# Patient Record
Sex: FEMALE | Race: BLACK OR AFRICAN AMERICAN | NOT HISPANIC OR LATINO | Employment: FULL TIME | URBAN - METROPOLITAN AREA
[De-identification: names, ages, dates, MRNs, and addresses within clinical notes are randomized per-mention and may not be internally consistent; named-entity substitution may affect disease eponyms.]

---

## 2017-01-25 ENCOUNTER — TELEPHONE (OUTPATIENT)
Dept: OBSTETRICS AND GYNECOLOGY | Facility: CLINIC | Age: 37
End: 2017-01-25

## 2017-01-25 DIAGNOSIS — Z36.89 ENCOUNTER FOR ROUTINE FETAL ULTRASOUND: Primary | ICD-10-CM

## 2017-01-25 NOTE — TELEPHONE ENCOUNTER
----- Message from Lianne Aceves sent at 1/24/2017 11:59 AM CST -----  Contact: pt   _X  1st Request  _  2nd Request  _  3rd Request        Who: SANTA PITTS [[id number]]    Why: pt is needing to schedule initial OB appt pt LMP 12/3/16 pt would like to be seen ay [facility]     What Number to Call Back: [phone].    When to Expect a call back: (Before the end of the day)   -- if call after 3:00 call back will be tomorrow.

## 2017-02-10 ENCOUNTER — HOSPITAL ENCOUNTER (OUTPATIENT)
Facility: OTHER | Age: 37
LOS: 1 days | Discharge: HOME | End: 2017-02-13
Attending: EMERGENCY MEDICINE | Admitting: OBSTETRICS & GYNECOLOGY
Payer: COMMERCIAL

## 2017-02-10 DIAGNOSIS — Z34.90 EARLY STAGE OF PREGNANCY: ICD-10-CM

## 2017-02-10 DIAGNOSIS — N39.0 URINARY TRACT INFECTION, SITE NOT SPECIFIED: ICD-10-CM

## 2017-02-10 DIAGNOSIS — Z3A.10 10 WEEKS GESTATION OF PREGNANCY: ICD-10-CM

## 2017-02-10 DIAGNOSIS — R11.14 BILIOUS VOMITING WITH NAUSEA: ICD-10-CM

## 2017-02-10 DIAGNOSIS — R11.2 INTRACTABLE VOMITING WITH NAUSEA, UNSPECIFIED VOMITING TYPE: Primary | ICD-10-CM

## 2017-02-10 PROCEDURE — 96366 THER/PROPH/DIAG IV INF ADDON: CPT

## 2017-02-10 PROCEDURE — G0378 HOSPITAL OBSERVATION PER HR: HCPCS

## 2017-02-10 PROCEDURE — 96365 THER/PROPH/DIAG IV INF INIT: CPT

## 2017-02-10 PROCEDURE — 96375 TX/PRO/DX INJ NEW DRUG ADDON: CPT

## 2017-02-10 PROCEDURE — 99284 EMERGENCY DEPT VISIT MOD MDM: CPT | Mod: 24,,, | Performed by: EMERGENCY MEDICINE

## 2017-02-10 PROCEDURE — 99283 EMERGENCY DEPT VISIT LOW MDM: CPT | Mod: ,,, | Performed by: EMERGENCY MEDICINE

## 2017-02-10 PROCEDURE — 96361 HYDRATE IV INFUSION ADD-ON: CPT

## 2017-02-10 PROCEDURE — 25000003 PHARM REV CODE 250: Performed by: EMERGENCY MEDICINE

## 2017-02-10 PROCEDURE — 99284 EMERGENCY DEPT VISIT MOD MDM: CPT | Mod: 25

## 2017-02-10 PROCEDURE — 63600175 PHARM REV CODE 636 W HCPCS: Performed by: EMERGENCY MEDICINE

## 2017-02-10 PROCEDURE — 11000001 HC ACUTE MED/SURG PRIVATE ROOM

## 2017-02-10 RX ORDER — METOCLOPRAMIDE HYDROCHLORIDE 5 MG/ML
INJECTION INTRAMUSCULAR; INTRAVENOUS
Status: DISPENSED
Start: 2017-02-10 | End: 2017-02-11

## 2017-02-10 RX ORDER — ONDANSETRON 2 MG/ML
8 INJECTION INTRAMUSCULAR; INTRAVENOUS
Status: COMPLETED | OUTPATIENT
Start: 2017-02-10 | End: 2017-02-10

## 2017-02-10 RX ORDER — METOCLOPRAMIDE HYDROCHLORIDE 5 MG/ML
10 INJECTION INTRAMUSCULAR; INTRAVENOUS
Status: COMPLETED | OUTPATIENT
Start: 2017-02-10 | End: 2017-02-10

## 2017-02-10 RX ORDER — SODIUM CHLORIDE 9 MG/ML
1000 INJECTION, SOLUTION INTRAVENOUS
Status: COMPLETED | OUTPATIENT
Start: 2017-02-10 | End: 2017-02-10

## 2017-02-10 RX ADMIN — SODIUM CHLORIDE 1000 ML: 0.9 INJECTION, SOLUTION INTRAVENOUS at 09:02

## 2017-02-10 RX ADMIN — SODIUM CHLORIDE 1000 ML: 0.9 INJECTION, SOLUTION INTRAVENOUS at 07:02

## 2017-02-10 RX ADMIN — SODIUM CHLORIDE 1000 ML: 0.9 INJECTION, SOLUTION INTRAVENOUS at 05:02

## 2017-02-10 RX ADMIN — PYRIDOXINE HYDROCHLORIDE 50 MG: 100 INJECTION, SOLUTION INTRAMUSCULAR; INTRAVENOUS at 09:02

## 2017-02-10 RX ADMIN — METOCLOPRAMIDE 10 MG: 5 INJECTION, SOLUTION INTRAMUSCULAR; INTRAVENOUS at 07:02

## 2017-02-10 RX ADMIN — ONDANSETRON 8 MG: 2 INJECTION INTRAMUSCULAR; INTRAVENOUS at 05:02

## 2017-02-10 NOTE — ED TRIAGE NOTES
C/o feeling dehydrated . States she threw up and felt dizzy after being dc'd from ER earlier today. Pregnant  - last period was 12/3/2016

## 2017-02-10 NOTE — ED PROVIDER NOTES
Encounter Date: 2/10/2017    SCRIBE #1 NOTE: I, Andradeus Moss, am scribing for, and in the presence of, Jarod Ca MD.       History     Chief Complaint   Patient presents with    Dizziness     Just discharged from ED for vomiting and dizziness. Reports still not feeling well. Dr. Ca made aware of patient checkin back in.      Review of patient's allergies indicates:  No Known Allergies  HPI Comments: Time seen by provider: 6:00 PM    This is a 36 y.o. female who was just evaluated in the ER. She was feeling better and discharged, but threw up in the parking lot prompting return. She states when she got to the parking lot, she felt a little light headed and vomited subsequently returning here. No further complaints or concerns at this time.     Pt initially presented with complaint of vomiting during 10th week gestation with onset 1 week ago. Reports A1. Associated sx include inability to tolerate PO x2 days, mild abdominal discomfort described as a soreness and dehydration. Pt does endorse vomiting with previous pregnancies for which she had to receive fluids but was never admitted to the hospital. Has not yet seen OB doctor for current pregnancy. Denies any vaginal bleeding or discharge. LNMP was December 3, 2016.        The history is provided by the patient and medical records.     Past Medical History   Diagnosis Date    Pregnancy      No past medical history pertinent negatives.  History reviewed. No pertinent past surgical history.  History reviewed. No pertinent family history.  Social History   Substance Use Topics    Smoking status: Never Smoker    Smokeless tobacco: None    Alcohol use No      Comment: occasional     Review of Systems   Constitutional: Negative for fever.   HENT: Negative for sore throat.    Respiratory: Negative for shortness of breath.    Cardiovascular: Negative for chest pain.   Gastrointestinal: Positive for nausea and vomiting.   Genitourinary: Negative for dysuria,  vaginal bleeding and vaginal discharge.   Musculoskeletal: Negative for back pain.   Skin: Negative for rash.   Neurological: Positive for light-headedness. Negative for weakness.   Hematological: Does not bruise/bleed easily.   All other systems reviewed and are negative.      Physical Exam   Initial Vitals   BP Pulse Resp Temp SpO2   02/10/17 1712 02/10/17 1712 02/10/17 1712 02/10/17 1712 02/10/17 1712   110/76 82 18 98.8 °F (37.1 °C) 97 %     Physical Exam    Vitals reviewed.  Constitutional: Vital signs are normal. She appears well-developed and well-nourished.   HENT:   Head: Normocephalic and atraumatic.   Mouth/Throat: Oropharynx is clear and moist.   Eyes: Conjunctivae and EOM are normal. Pupils are equal, round, and reactive to light.   Neck: Trachea normal and normal range of motion. Neck supple.   Cardiovascular: Normal rate, regular rhythm, normal heart sounds and normal pulses.   Pulmonary/Chest: Breath sounds normal. No respiratory distress.   Abdominal: Soft. Normal appearance and bowel sounds are normal. There is no tenderness.   Musculoskeletal: Normal range of motion.   Neurological: She is alert and oriented to person, place, and time.   Skin: Skin is warm and dry.         ED Course   Procedures  Labs Reviewed - No data to display          Medical Decision Making:   History:   Old Medical Records: I decided to obtain old medical records.  Other:   I have discussed this case with another health care provider.            Scribe Attestation:   Scribe #1: I performed the above scribed service and the documentation accurately describes the services I performed. I attest to the accuracy of the note.    Attending Attestation:           Physician Attestation for Scribe:  Physician Attestation Statement for Scribe #1: I, Jarod Ca MD, reviewed documentation, as scribed by Andrade Moss in my presence, and it is both accurate and complete.         Attending ED Notes:   8:07 PM: Pt not feeling better.  Will admit to the hospital because she is unable to tolerate PO.     8:29 PM: Spoke with Dr. Farias who states we have no OB services available and does not feel comfortable admitting. Called transfer center at this time.     9:54 PM  Discussed with Dr Albert, OB, ok to transfer. Patient still nauseated and states she does not feel well enough to go home. Will be transferred to [facility].           ED Course     Clinical Impression:   The primary encounter diagnosis was Intractable vomiting with nausea, unspecified vomiting type. A diagnosis of Early stage of pregnancy was also pertinent to this visit.    Disposition:   Disposition: Transferred  Condition: Zena Ca MD  02/10/17 5295

## 2017-02-10 NOTE — IP AVS SNAPSHOT
[facility] Location ([facility])  [address] [place], [place] [address]  Phone: [phone]           Patient Discharge Instructions     Our goal is to set you up for success. This packet includes information on your condition, medications, and your home care. It will help you to care for yourself so you don't get sicker and need to go back to the hospital.     Please ask your nurse if you have any questions.        There are many details to remember when preparing to leave the hospital. Here is what you will need to do:    1. Take your medicine. If you are prescribed medications, review your Medication List in the following pages. You may have new medications to  at the pharmacy and others that you'll need to stop taking. Review the instructions for how and when to take your medications. Talk with your doctor or nurses if you are unsure of what to do.     2. Go to your follow-up appointments. Specific follow-up information is listed in the following pages. Your may be contacted by a transition nurse or clinical provider about future appointments. Be sure we have all of the phone numbers to reach you, if needed. Please contact your provider's office if you are unable to make an appointment.     3. Watch for warning signs. Your doctor or nurse will give you detailed warning signs to watch for and when to call for assistance. These instructions may also include educational information about your condition. If you experience any of warning signs to your health, call your doctor.               [facility]  Unless otherwise directed by your provider, please contact [facility], our nurse care line that is available for 24/7 assistance.     [phone] (toll-free)    Registered nurses in the [facility] provide clinical advisement, health education, appointment booking, and other advisory services.                    ** Verify the list of medication(s) below is accurate and up to  date. Carry this with you in case of emergency. If your medications have changed, please notify your healthcare provider.             Medication List      START taking these medications        Additional Info                      metoclopramide HCl 5 MG tablet   Commonly known as:  REGLAN   Quantity:  20 tablet   Refills:  1   Dose:  5 mg    Last time this was given:  5 mg on 2/12/2017  9:51 PM   Instructions:  Take 1 tablet (5 mg total) by mouth every 8 (eight) hours.     Begin Date    AM    Noon    PM    Bedtime       ondansetron 4 MG Tbdl   Commonly known as:  ZOFRAN-ODT   Quantity:  60 tablet   Refills:  3   Dose:  4 mg    Last time this was given:  4 mg on 2/13/2017  6:23 AM   Instructions:  Take 1 tablet (4 mg total) by mouth every 6 (six) hours as needed (nausea or vomiting).     Begin Date    AM    Noon    PM    Bedtime       pyridoxine (vitamin B6) 25 MG Tab   Commonly known as:  B-6   Refills:  0   Dose:  25 mg    Last time this was given:  50 mg on 2/12/2017  9:51 PM   Instructions:  Take 1 tablet (25 mg total) by mouth 3 (three) times daily.     Begin Date    AM    Noon    PM    Bedtime         CONTINUE taking these medications        Additional Info                      methylPREDNISolone 4 mg tablet   Commonly known as:  MEDROL DOSEPACK   Quantity:  1 Package   Refills:  0    Instructions:  Take as directed on packaging     Begin Date    AM    Noon    PM    Bedtime       nitrofurantoin (macrocrystal-monohydrate) 100 MG capsule   Commonly known as:  MACROBID   Quantity:  10 capsule   Refills:  0   Dose:  100 mg    Instructions:  Take 1 capsule (100 mg total) by mouth 2 (two) times daily.     Begin Date    AM    Noon    PM    Bedtime       phenazopyridine 100 MG tablet   Commonly known as:  PYRIDIUM   Quantity:  9 tablet   Refills:  0   Dose:  100 mg    Instructions:  Take 1 tablet (100 mg total) by mouth 3 (three) times daily as needed.     Begin Date    AM    Noon    PM    Bedtime       promethazine 25  MG tablet   Commonly known as:  PHENERGAN   Quantity:  15 tablet   Refills:  0   Dose:  25 mg    Last time this was given:  12.5 mg on 2/11/2017  3:05 PM   Instructions:  Take 1 tablet (25 mg total) by mouth every 6 (six) hours as needed for Nausea.     Begin Date    AM    Noon    PM    Bedtime       sulfamethoxazole-trimethoprim 800-160mg 800-160 mg Tab   Commonly known as:  BACTRIM DS   Quantity:  14 tablet   Refills:  1   Dose:  1 tablet    Instructions:  Take 1 tablet by mouth 2 (two) times daily.     Begin Date    AM    Noon    PM    Bedtime         STOP taking these medications     naproxen 500 MG tablet   Commonly known as:  NAPROSYN            Where to Get Your Medications      These medications were sent to [facility] [address] [place] [address] AT [facility] of [address] [address]     Phone:  [phone]     metoclopramide HCl 5 MG tablet    ondansetron 4 MG Tbdl         You can get these medications from any pharmacy     You don't need a prescription for these medications     pyridoxine (vitamin B6) 25 MG Tab                  Please bring to all follow up appointments:    1. A copy of your discharge instructions.  2. All medicines you are currently taking in their original bottles.  3. Identification and insurance card.    Please arrive 15 minutes ahead of scheduled appointment time.    Please call 24 hours in advance if you must reschedule your appointment and/or time.        Your Scheduled Appointments     Feb 15, 2017  9:00 AM CST   New Gynecological with Arlet Constantino MD   [facility] - OB/GYN 5th Floor ([facility] )    [address]  [place] [address] [phone]            Feb 15, 2017 11:00 AM CST   Ultrasound with ULTRASOUND, [facility]   [facility] [address] [facility]    [address] [facility] [facility] [address]  [facility] [address] [phone]              Follow-up Information     Follow up with Arlet Constantino  "MD In 3 days.    Specialty:  Obstetrics and Gynecology    Why:  prenatal care    Contact information:    [address] [facility] [address]  [phone]          Discharge Instructions     Future Orders    Activity as tolerated     Call MD for:  difficulty breathing or increased cough     Call MD for:  increased confusion or weakness     Call MD for:  persistent dizziness, light-headedness, or visual disturbances     Call MD for:  persistent nausea and vomiting or diarrhea     Call MD for:  redness, tenderness, or signs of infection (pain, swelling, redness, odor or green/yellow discharge around incision site)     Call MD for:  severe persistent headache     Call MD for:  severe uncontrolled pain     Call MD for:  temperature >100.4     Call MD for:  worsening rash     Diet general     Questions:    Total calories:      Fat restriction, if any:      Protein restriction, if any:      Na restriction, if any:      Fluid restriction:      Additional restrictions:      No dressing needed         Discharge Instructions       Your feedback is important to us.  If you should receive a survey in the next few days, please share your experience with us.      Discharge References/Attachments     HYPEREMESIS GRAVIDARUM (ENGLISH)    METOCLOPRAMIDE TABLETS (ENGLISH)    ONDANSETRON TABLETS (ENGLISH)    PYRIDOXINE, VITAMIN B6 TABLETS (ENGLISH)        Primary Diagnosis     Your primary diagnosis was:  Intractable Vomiting With Nausea      Admission Information     Date & Time Provider Department [facility]    2/10/2017  5:20 PM Arlet Constantino MD [facility] [phone]      Care Providers     Provider Role Specialty Primary office phone    Arlet Constantino MD Attending Provider Obstetrics and Gynecology [phone]      Your Vitals Were     BP Pulse Temp Resp Height Weight    106/64 (BP Location: Right arm, Patient Position: Sitting, BP Method: Automatic) 73 98.4 °F (36.9 °C) (Oral) 18 5' 4" (1.626 m) 93.5 kg " (206 lb 2.1 oz)    Last Period SpO2 BMI          12/03/2016 (Exact Date) 98% 35.38 kg/m2        Recent Lab Values     No lab values to display.      Pending Labs     Order Current Status    Hepatitis B surface antigen In process    RPR In process    Rubella antibody, IgG In process      Allergies as of 2/13/2017     No Known Allergies      Advance Directives     An advance directive is a document which, in the event you are no longer able to make decisions for yourself, tells your healthcare team what kind of treatment you do or do not want to receive, or who you would like to make those decisions for you.  If you do not currently have an advance directive, [facility] encourages you to create one.  For more information call:  [phone]), [phone] ([phone]),  or log on to [email or web address].        Language Assistance Services     ATTENTION: Language assistance services are available, free of charge. Please call [phone].      ATENCIÓN: Si habla español, tiene a posada disposición servicios gratuitos de asistencia lingüística. Llame al [phone].     [facility] Ý: N?u b?n nói Ti?ng Vi?t, có các d?ch v? h? tr? ngôn ng? mi?n phí dành cho b?n. G?i s? [phone].         [facility] complies with applicable Federal civil rights laws and does not discriminate on the basis of race, color, national origin, age, disability, or sex.

## 2017-02-10 NOTE — ED NOTES
LOC: The patient is awake and alert; oriented x 3 and speaking appropriately.  APPEARANCE: Patient resting comfortably, patient is clean and well groomed  SKIN: warm and dry, normal skin turgor & moist mucus membranes, skin intact, no breakdown noted.  MUSCULOSKELETAL: Patient moving all extremities well, no obvious swelling or deformities noted  RESPIRATORY: Airway is open and patent, breath sounds clear throughout all lung fields; respirations are spontaneous, normal effort and rate  CARDIAC: Patient has a normal rate, no peripheral edema noted, capillary refill < 3 seconds; No complaints of chest pain   ABDOMEN: Soft and non tender to palpation, no distention noted. Bowel sounds present x 4

## 2017-02-11 PROBLEM — R11.2 INTRACTABLE VOMITING WITH NAUSEA: Status: ACTIVE | Noted: 2017-02-11

## 2017-02-11 LAB
ABO + RH BLD: NORMAL
AMPHET+METHAMPHET UR QL: NEGATIVE
BARBITURATES UR QL SCN>200 NG/ML: NEGATIVE
BASOPHILS # BLD AUTO: 0 K/UL
BASOPHILS NFR BLD: 0 %
BENZODIAZ UR QL SCN>200 NG/ML: NEGATIVE
BLD GP AB SCN CELLS X3 SERPL QL: NORMAL
BZE UR QL SCN: NEGATIVE
CANNABINOIDS UR QL SCN: NEGATIVE
CREAT UR-MCNC: 79.1 MG/DL
DIFFERENTIAL METHOD: ABNORMAL
EOSINOPHIL # BLD AUTO: 0.3 K/UL
EOSINOPHIL NFR BLD: 2.8 %
ERYTHROCYTE [DISTWIDTH] IN BLOOD BY AUTOMATED COUNT: 13 %
ETHANOL UR-MCNC: <10 MG/DL
HCT VFR BLD AUTO: 31.9 %
HGB BLD-MCNC: 10.5 G/DL
HIV1+2 IGG SERPL QL IA.RAPID: NEGATIVE
LYMPHOCYTES # BLD AUTO: 2.8 K/UL
LYMPHOCYTES NFR BLD: 27.4 %
MCH RBC QN AUTO: 27.7 PG
MCHC RBC AUTO-ENTMCNC: 32.9 %
MCV RBC AUTO: 84 FL
METHADONE UR QL SCN>300 NG/ML: NEGATIVE
MONOCYTES # BLD AUTO: 0.8 K/UL
MONOCYTES NFR BLD: 8 %
NEUTROPHILS # BLD AUTO: 6.2 K/UL
NEUTROPHILS NFR BLD: 61.6 %
OPIATES UR QL SCN: NEGATIVE
PCP UR QL SCN>25 NG/ML: NEGATIVE
PLATELET # BLD AUTO: 300 K/UL
PMV BLD AUTO: 9.5 FL
RBC # BLD AUTO: 3.79 M/UL
TOXICOLOGY INFORMATION: NORMAL
WBC # BLD AUTO: 10.08 K/UL

## 2017-02-11 PROCEDURE — G0378 HOSPITAL OBSERVATION PER HR: HCPCS

## 2017-02-11 PROCEDURE — 85025 COMPLETE CBC W/AUTO DIFF WBC: CPT

## 2017-02-11 PROCEDURE — 25000003 PHARM REV CODE 250: Performed by: OBSTETRICS & GYNECOLOGY

## 2017-02-11 PROCEDURE — 86762 RUBELLA ANTIBODY: CPT

## 2017-02-11 PROCEDURE — 86592 SYPHILIS TEST NON-TREP QUAL: CPT

## 2017-02-11 PROCEDURE — 80307 DRUG TEST PRSMV CHEM ANLYZR: CPT

## 2017-02-11 PROCEDURE — 87340 HEPATITIS B SURFACE AG IA: CPT

## 2017-02-11 PROCEDURE — 63600175 PHARM REV CODE 636 W HCPCS: Performed by: OBSTETRICS & GYNECOLOGY

## 2017-02-11 PROCEDURE — 86900 BLOOD TYPING SEROLOGIC ABO: CPT

## 2017-02-11 PROCEDURE — 86850 RBC ANTIBODY SCREEN: CPT

## 2017-02-11 PROCEDURE — 86701 HIV-1ANTIBODY: CPT

## 2017-02-11 PROCEDURE — 25000003 PHARM REV CODE 250: Performed by: STUDENT IN AN ORGANIZED HEALTH CARE EDUCATION/TRAINING PROGRAM

## 2017-02-11 RX ORDER — AMOXICILLIN 250 MG
1 CAPSULE ORAL NIGHTLY PRN
Status: DISCONTINUED | OUTPATIENT
Start: 2017-02-11 | End: 2017-02-13 | Stop reason: HOSPADM

## 2017-02-11 RX ORDER — DIPHENHYDRAMINE HYDROCHLORIDE 50 MG/ML
25 INJECTION INTRAMUSCULAR; INTRAVENOUS EVERY 4 HOURS PRN
Status: DISCONTINUED | OUTPATIENT
Start: 2017-02-11 | End: 2017-02-13 | Stop reason: HOSPADM

## 2017-02-11 RX ORDER — SODIUM CHLORIDE, SODIUM LACTATE, POTASSIUM CHLORIDE, CALCIUM CHLORIDE 600; 310; 30; 20 MG/100ML; MG/100ML; MG/100ML; MG/100ML
INJECTION, SOLUTION INTRAVENOUS CONTINUOUS
Status: DISCONTINUED | OUTPATIENT
Start: 2017-02-11 | End: 2017-02-11

## 2017-02-11 RX ORDER — DEXTROSE MONOHYDRATE, SODIUM CHLORIDE, AND POTASSIUM CHLORIDE 50; 1.49; 4.5 G/1000ML; G/1000ML; G/1000ML
INJECTION, SOLUTION INTRAVENOUS CONTINUOUS
Status: DISCONTINUED | OUTPATIENT
Start: 2017-02-11 | End: 2017-02-13 | Stop reason: HOSPADM

## 2017-02-11 RX ORDER — ACETAMINOPHEN 325 MG/1
650 TABLET ORAL EVERY 4 HOURS PRN
Status: DISCONTINUED | OUTPATIENT
Start: 2017-02-11 | End: 2017-02-13 | Stop reason: HOSPADM

## 2017-02-11 RX ORDER — SODIUM CHLORIDE 9 MG/ML
INJECTION, SOLUTION INTRAVENOUS ONCE
Status: DISCONTINUED | OUTPATIENT
Start: 2017-02-11 | End: 2017-02-13 | Stop reason: HOSPADM

## 2017-02-11 RX ORDER — DIPHENHYDRAMINE HCL 25 MG
25 CAPSULE ORAL EVERY 4 HOURS PRN
Status: DISCONTINUED | OUTPATIENT
Start: 2017-02-11 | End: 2017-02-13 | Stop reason: HOSPADM

## 2017-02-11 RX ORDER — PROMETHAZINE HYDROCHLORIDE 12.5 MG/1
12.5 SUPPOSITORY RECTAL EVERY 4 HOURS
Status: DISCONTINUED | OUTPATIENT
Start: 2017-02-11 | End: 2017-02-11

## 2017-02-11 RX ORDER — PRENATAL WITH FERROUS FUM AND FOLIC ACID 3080; 920; 120; 400; 22; 1.84; 3; 20; 10; 1; 12; 200; 27; 25; 2 [IU]/1; [IU]/1; MG/1; [IU]/1; MG/1; MG/1; MG/1; MG/1; MG/1; MG/1; UG/1; MG/1; MG/1; MG/1; MG/1
1 TABLET ORAL DAILY
Status: DISCONTINUED | OUTPATIENT
Start: 2017-02-11 | End: 2017-02-13 | Stop reason: HOSPADM

## 2017-02-11 RX ORDER — SODIUM CHLORIDE 9 MG/ML
1000 INJECTION, SOLUTION INTRAVENOUS
Status: COMPLETED | OUTPATIENT
Start: 2017-02-11 | End: 2017-02-11

## 2017-02-11 RX ORDER — PROMETHAZINE HYDROCHLORIDE 12.5 MG/1
12.5 TABLET ORAL EVERY 6 HOURS PRN
Status: DISCONTINUED | OUTPATIENT
Start: 2017-02-11 | End: 2017-02-12

## 2017-02-11 RX ORDER — PYRIDOXINE HCL (VITAMIN B6) 25 MG
50 TABLET ORAL EVERY 6 HOURS
Status: DISCONTINUED | OUTPATIENT
Start: 2017-02-11 | End: 2017-02-11

## 2017-02-11 RX ORDER — PYRIDOXINE HCL (VITAMIN B6) 25 MG
50 TABLET ORAL NIGHTLY
Status: DISCONTINUED | OUTPATIENT
Start: 2017-02-11 | End: 2017-02-13 | Stop reason: HOSPADM

## 2017-02-11 RX ORDER — ONDANSETRON 2 MG/ML
4 INJECTION INTRAMUSCULAR; INTRAVENOUS EVERY 8 HOURS PRN
Status: DISCONTINUED | OUTPATIENT
Start: 2017-02-11 | End: 2017-02-11

## 2017-02-11 RX ORDER — SIMETHICONE 80 MG
1 TABLET,CHEWABLE ORAL EVERY 6 HOURS PRN
Status: DISCONTINUED | OUTPATIENT
Start: 2017-02-11 | End: 2017-02-13 | Stop reason: HOSPADM

## 2017-02-11 RX ORDER — ONDANSETRON 4 MG/1
4 TABLET, ORALLY DISINTEGRATING ORAL EVERY 6 HOURS PRN
Status: DISCONTINUED | OUTPATIENT
Start: 2017-02-11 | End: 2017-02-12

## 2017-02-11 RX ADMIN — Medication 1 EACH: at 09:02

## 2017-02-11 RX ADMIN — SODIUM CHLORIDE 1000 ML: 0.9 INJECTION, SOLUTION INTRAVENOUS at 02:02

## 2017-02-11 RX ADMIN — Medication 50 MG: at 05:02

## 2017-02-11 RX ADMIN — PROMETHAZINE HYDROCHLORIDE 12.5 MG: 12.5 TABLET ORAL at 03:02

## 2017-02-11 RX ADMIN — Medication 50 MG: at 11:02

## 2017-02-11 RX ADMIN — Medication 50 MG: at 09:02

## 2017-02-11 RX ADMIN — ACETAMINOPHEN 650 MG: 325 TABLET ORAL at 06:02

## 2017-02-11 RX ADMIN — FOLIC ACID: 5 INJECTION, SOLUTION INTRAMUSCULAR; INTRAVENOUS; SUBCUTANEOUS at 04:02

## 2017-02-11 RX ADMIN — DEXTROSE MONOHYDRATE, SODIUM CHLORIDE, AND POTASSIUM CHLORIDE: 50; 4.5; 1.49 INJECTION, SOLUTION INTRAVENOUS at 10:02

## 2017-02-11 RX ADMIN — DEXTROSE, SODIUM CHLORIDE, SODIUM LACTATE, POTASSIUM CHLORIDE, AND CALCIUM CHLORIDE 500 ML: 5; .6; .31; .03; .02 INJECTION, SOLUTION INTRAVENOUS at 01:02

## 2017-02-11 RX ADMIN — ONDANSETRON 4 MG: 4 TABLET, ORALLY DISINTEGRATING ORAL at 01:02

## 2017-02-11 NOTE — NURSING
Pt to [facility] as transport from [facility]. Pt weak, and unstable while trying to walk to bathroom. VSS, afebrile. Currently c/o nausea; no vomiting. IV present in left upper arm.

## 2017-02-11 NOTE — PLAN OF CARE
Problem: Patient Care Overview  Goal: Plan of Care Review  Outcome: Ongoing (interventions implemented as appropriate)  Current plan of care regarding patient's nausea status reviewed during her bedside assessment.  Few questions asked as patient was very tired/sleepy and wished to rest.    Problem: Nausea/Vomiting (Adult)  Goal: Symptom Relief  Patient will demonstrate the desired outcomes by discharge/transition of care.   Outcome: Ongoing (interventions implemented as appropriate)  Patient has thus far been free of nausea and/or vomiting since arrival.  Phenergan suppositories have NOT been given yet.  Goal: Adequate Hydration  Patient will demonstrate the desired outcomes by discharge/transition of care.   Outcome: Ongoing (interventions implemented as appropriate)  Continuous IVF (NaCl) infusing.

## 2017-02-11 NOTE — PROGRESS NOTES
PROGRESS NOTE  ANTEPARTUM    Admit Date: 2/10/2017   LOS: 1 day     Reason for Admission:  Nausea and vomiting of pregnancy    SUBJECTIVE:     Crys Peña is a 36 y.o. female at ~8 weeks gestation admitted for nausea and vomiting.  Patient is doing well this morning.  Reports that she had no nausea and vomiting over night.  She feels somewhat better with IV fluids.  She has taken B6 but has not required any additional medication.  She has not had anything by mouth this morning, but does report that she has an increased appetite.  She has no other complaints.      Scheduled Meds:   sodium chloride 0.9%   Intravenous Once    prenatal vitamin  1 tablet Oral Daily    promethazine  12.5 mg Rectal Q4H    pyridoxine (vitamin B6)  50 mg Oral Q6H     Continuous Infusions:   PRN Meds:diphenhydrAMINE, diphenhydrAMINE, ondansetron, senna-docusate 8.6-50 mg, simethicone    Review of patient's allergies indicates:  No Known Allergies    OBJECTIVE:     Vital Signs (Most Recent)  Temp: 98.6 °F (37 °C) (02/11/17 0807)  Pulse: 84 (02/11/17 0807)  Resp: 18 (02/11/17 0807)  BP: 95/63 (02/11/17 0807)  SpO2: 96 % (02/11/17 0807)    Temperature Range Min/Max (Last 24H):  Temp:  [97.5 °F (36.4 °C)-99.2 °F (37.3 °C)]     Vital Signs Range (Last 24H):  Temp:  [97.5 °F (36.4 °C)-99.2 °F (37.3 °C)]   Pulse:  [77-91]   Resp:  [16-18]   BP: ()/(50-76)   SpO2:  [95 %-99 %]     I & O (Last 24H):  Intake/Output Summary (Last 24 hours) at 02/11/17 0906  Last data filed at 02/11/17 0803   Gross per 24 hour   Intake               69 ml   Output              425 ml   Net             -356 ml       Physical Exam:  General: well developed, well nourished  Lungs:  normal respiratory effort  Abdomen: soft, non-tender non-distented; bowel sounds normal; no masses,  no organomegaly  Pelvic:  Exam deferred.    Laboratory:  CBC:   Recent Labs  Lab 02/11/17  0419   WBC 10.08   RBC 3.79*   HGB 10.5*   HCT 31.9*      MCV 84   MCH 27.7   MCHC  32.9     BMP:   Recent Labs  Lab 02/10/17  1424   *   *   K 3.5      CO2 22*   BUN 10   CREATININE 0.8   CALCIUM 9.7   MG 2.1     Microbiology Results (last 7 days)     ** No results found for the last 168 hours. **          Recent Labs  Lab 02/10/17  1431   COLORU Yellow   SPECGRAV 1.030   PHUR 6.0   PROTEINUA 1+*   BACTERIA Many*   NITRITE Positive*   LEUKOCYTESUR 2+*   UROBILINOGEN >8.0   HYALINECASTS 0       ASSESSMENT/PLAN:     Active Hospital Problems    Diagnosis  POA    Intractable vomiting with nausea [R11.2]  Yes    N&V (nausea and vomiting) [R11.2]  Yes      Resolved Hospital Problems    Diagnosis Date Resolved POA   No resolved problems to display.       Assessment:   36 y.o.female with an IUP at approximately 8 weeks gestation who is hospitalized for nausea and vomiting of pregnancy.    Plan:  Advance diet as tolerated today; patient advised to start with small bland meals  Phenergan and zofran changed to oral, prn  Continue macrobid for UTI  Prenatal labs  PNV  Continue to monitor; discharge once tolerating po      Arlet Constantino

## 2017-02-11 NOTE — H&P
HISTORY AND PHYSICAL  ANTEPARTUM          Subjective:       Crys Peña is a 36 y.o.  female with IUP at ~8wga measured by ultrasound (2017, official read pending) with no PNC who is being admitted for N/V x 1 week, worse over the past 2 days. Reports last tolerating PO 2 days ago. Reports that she is now feeling better, not experiencing N/V. Denies abdominal pain, diarrhea. Denies recent travel, sick contacts, new foods. Denies weight loss. Denies present or past MJ use, other drug use.  Denies vaginal bleeding, vaginal discharge.  [place] [facility]  [place].    PMHx:   Past Medical History   Diagnosis Date    Pregnancy        PSHx: History reviewed. No pertinent past surgical history.    All: Review of patient's allergies indicates:  No Known Allergies    Meds:   Prescriptions Prior to Admission   Medication Sig Dispense Refill Last Dose    methylPREDNISolone (MEDROL DOSEPACK) 4 mg tablet Take as directed on packaging 1 Package 0     naproxen (NAPROSYN) 500 MG tablet Take 1 tablet (500 mg total) by mouth 2 (two) times daily as needed (pain/swelling). 20 tablet 0     nitrofurantoin, macrocrystal-monohydrate, (MACROBID) 100 MG capsule Take 1 capsule (100 mg total) by mouth 2 (two) times daily. 10 capsule 0     phenazopyridine (PYRIDIUM) 100 MG tablet Take 1 tablet (100 mg total) by mouth 3 (three) times daily as needed. 9 tablet 0     promethazine (PHENERGAN) 25 MG tablet Take 1 tablet (25 mg total) by mouth every 6 (six) hours as needed for Nausea. 15 tablet 0     sulfamethoxazole-trimethoprim 800-160mg (BACTRIM DS) 800-160 mg Tab Take 1 tablet by mouth 2 (two) times daily. 14 tablet 1        SH:   Social History     Social History    Marital status: Single     Spouse name: N/A    Number of children: N/A    Years of education: N/A     Occupational History    Not on file.     Social History Main Topics    Smoking status: Never Smoker    Smokeless tobacco: Not on file     Alcohol use No      Comment: occasional    Drug use: No    Sexual activity: Not on file     Other Topics Concern    Not on file     Social History Narrative       FH: History reviewed. No pertinent family history.    OBHx:   Obstetric History       T0      TAB0   SAB0   E0   M0   L0       # Outcome Date GA Lbr Hernan/2nd Weight Sex Delivery Anes PTL Lv   1 Current                   Objective:         Visit Vitals    /68    Pulse 85    Temp 97.5 °F (36.4 °C) (Temporal)    Resp 16    Wt 86.2 kg (190 lb)    LMP 2016 (Exact Date)    SpO2 97%    Breastfeeding No    BMI 32.61 kg/m2       Vitals:    02/10/17 2346 17 0001 17 0016 17 0031   BP: 107/68 109/70 110/75 107/68   BP Location:       Patient Position:       BP Method:       Pulse: 86 85 90 85   Resp:       Temp:       TempSrc:       SpO2:       Weight:           General:   alert, appears stated age and cooperative   Lungs:   no increased work of breathing   Heart:   regular rate   Abdomen:  soft, nontender   Extremities negative edema, negative erythema   FHT: + on U/S     Lab Review  Blood Type p  GBBS: unk  Rubella: Unknown  RPR: unk  HIV: unk  HepB: unk      Assessment:        36 y.o.  female with IUP at ~8wga measured by ultrasound with N/V  Active Hospital Problems    Diagnosis  POA    Intractable vomiting with nausea [R11.2]  Yes    N&V (nausea and vomiting) [R11.2]  Yes      Resolved Hospital Problems    Diagnosis Date Resolved POA   No resolved problems to display.            Plan:         N/V  - Admit to antefloor  - NPO/NS  - CBC wnl  - CMP reviewed  - B6, Phenergan suppositories scheduled  - Zofran PRN    UTI  - +nitrites, 2+ leuks  - UCx  - Continue Macrobid    IUP @ ~8wga  - Prenatal labs  - Tox screen  - PNV  - F/u with OB/GYN      Mary Carmen Perales MD  PGY-2 OB/GYN  [phone]

## 2017-02-11 NOTE — PLAN OF CARE
Problem: Patient Care Overview  Goal: Plan of Care Review  Outcome: Ongoing (interventions implemented as appropriate)  Pt remains free from falls and injury. Pt resting comfortably in bed with no complaints at this time. Plan of care reviewed with the pt. Zofran and promethazine given for N/V as needed. Pt NPO. Banana bag infusing. Purposeful rounding performed. Bed in lowest position. Call light in reach. Will continue to monitor.

## 2017-02-11 NOTE — NURSING
Pt admitted to [facility]; transferred to room 304 at this time; transported in wheelchair. Belle GARCIA received pt and report.

## 2017-02-11 NOTE — PROGRESS NOTES
"S:  Pt starting to have more nausea.  Vomited twice.  Feels bad  Visit Vitals    BP 94/60 (BP Location: Right arm, Patient Position: Lying, BP Method: Automatic)    Pulse 74    Temp 98.9 °F (37.2 °C) (Oral)    Resp 18    Ht 5' 4" (1.626 m)    Wt 93.5 kg (206 lb 2.1 oz)    LMP 12/03/2016 (Exact Date)    SpO2 97%    Breastfeeding No  Comment: past 10 weeks pregnant    BMI 35.38 kg/m2     Assessment:  Hyperemesis gravidarum  Plan  Re-start IVF with MVI  Start scheduled vit b6/doxalamine  Poss d/c tomorrow.  "

## 2017-02-12 PROBLEM — R11.2 INTRACTABLE VOMITING WITH NAUSEA: Status: RESOLVED | Noted: 2017-02-11 | Resolved: 2017-02-12

## 2017-02-12 PROBLEM — R11.2 N&V (NAUSEA AND VOMITING): Status: RESOLVED | Noted: 2017-02-10 | Resolved: 2017-02-12

## 2017-02-12 PROCEDURE — 99225 PR SUBSEQUENT OBSERVATION CARE,LEVEL II: CPT | Mod: ,,, | Performed by: OBSTETRICS & GYNECOLOGY

## 2017-02-12 PROCEDURE — G0378 HOSPITAL OBSERVATION PER HR: HCPCS

## 2017-02-12 PROCEDURE — 25000003 PHARM REV CODE 250: Performed by: STUDENT IN AN ORGANIZED HEALTH CARE EDUCATION/TRAINING PROGRAM

## 2017-02-12 PROCEDURE — 25000003 PHARM REV CODE 250: Performed by: OBSTETRICS & GYNECOLOGY

## 2017-02-12 RX ORDER — METOCLOPRAMIDE 5 MG/1
5 TABLET ORAL EVERY 8 HOURS
Qty: 20 TABLET | Refills: 1 | Status: SHIPPED | OUTPATIENT
Start: 2017-02-12 | End: 2017-03-15

## 2017-02-12 RX ORDER — ONDANSETRON 4 MG/1
4 TABLET, ORALLY DISINTEGRATING ORAL EVERY 8 HOURS
Status: DISCONTINUED | OUTPATIENT
Start: 2017-02-12 | End: 2017-02-13 | Stop reason: HOSPADM

## 2017-02-12 RX ORDER — LANOLIN ALCOHOL/MO/W.PET/CERES
50 CREAM (GRAM) TOPICAL NIGHTLY
Refills: 0 | COMMUNITY
Start: 2017-02-12 | End: 2017-02-12 | Stop reason: HOSPADM

## 2017-02-12 RX ORDER — METOCLOPRAMIDE 5 MG/1
5 TABLET ORAL EVERY 8 HOURS
Status: DISCONTINUED | OUTPATIENT
Start: 2017-02-12 | End: 2017-02-13 | Stop reason: HOSPADM

## 2017-02-12 RX ORDER — ONDANSETRON 4 MG/1
4 TABLET, ORALLY DISINTEGRATING ORAL EVERY 6 HOURS PRN
Qty: 60 TABLET | Refills: 3 | Status: SHIPPED | OUTPATIENT
Start: 2017-02-12 | End: 2017-04-12

## 2017-02-12 RX ORDER — PYRIDOXINE HCL (VITAMIN B6) 25 MG
25 TABLET ORAL 3 TIMES DAILY
Refills: 0 | COMMUNITY
Start: 2017-02-12 | End: 2017-07-17

## 2017-02-12 RX ADMIN — Medication 1 EACH: at 09:02

## 2017-02-12 RX ADMIN — METOCLOPRAMIDE 5 MG: 5 TABLET ORAL at 09:02

## 2017-02-12 RX ADMIN — DEXTROSE MONOHYDRATE, SODIUM CHLORIDE, AND POTASSIUM CHLORIDE: 50; 4.5; 1.49 INJECTION, SOLUTION INTRAVENOUS at 02:02

## 2017-02-12 RX ADMIN — METOCLOPRAMIDE 5 MG: 5 TABLET ORAL at 02:02

## 2017-02-12 RX ADMIN — ACETAMINOPHEN 650 MG: 325 TABLET ORAL at 09:02

## 2017-02-12 RX ADMIN — ONDANSETRON 4 MG: 4 TABLET, ORALLY DISINTEGRATING ORAL at 04:02

## 2017-02-12 RX ADMIN — Medication 50 MG: at 09:02

## 2017-02-12 NOTE — MEDICAL/APP STUDENT
Delivery Discharge Summary  Obstetrics      Primary OB Clinician: Arlet Constantino MD    Admission date: 2/10/2017  Discharge date: 2017    Disposition: To home, self care    Admit Dx:      Patient Active Problem List   Diagnosis    N&V (nausea and vomiting)    Intractable vomiting with nausea     Discharge Dx:    Patient Active Problem List   Diagnosis    N&V (nausea and vomiting)    Intractable vomiting with nausea     Hospital Course:  Crys Peña is a 36 y.o. , who was admitted on 2/10/2017 at ~8wk gestation for hyperemesis and light-headedness. On initial assessment, vital signs were stable and physical exam was normal. Patient was subsequently transferred from [facility] hospital and admitted to floor for monitoring. On discharge day, patient's pain is controlled with oral pain medications. Pt is tolerating ambulation without SOB or CP, and PO diet without N/V. Reports lochia is ***mild. Denies any HA, vision changes, F/C, LE swelling. Denies any breast pain/soreness.  Pt in stable condition and ready for discharge. She has been instructed to continue ***as indicated as well as pain medications as needed and to follow up in the OB clinic in ***6 weeks with her obstetrics provider.    Pertinent studies:  Postpartum CBC  Lab Results   Component Value Date    WBC 10.08 2017    HGB 10.5 (L) 2017    HCT 31.9 (L) 2017    MCV 84 2017     2017     ***    Tubal Ligation: {Obgyn tubal:42456}  Feeding Method: {Source; infant milk:75243}  Rh Immune Globulin Given(B POS): N/A***  Rubella Vaccine Given: N/A***  Tdap Vaccine Given: N/A***    This patient has no babies on file.    Patient Instructions:   Current Discharge Medication List      CONTINUE these medications which have NOT CHANGED    Details   methylPREDNISolone (MEDROL DOSEPACK) 4 mg tablet Take as directed on packaging  Qty: 1 Package, Refills: 0      naproxen (NAPROSYN) 500 MG tablet Take 1 tablet (500 mg  total) by mouth 2 (two) times daily as needed (pain/swelling).  Qty: 20 tablet, Refills: 0      nitrofurantoin, macrocrystal-monohydrate, (MACROBID) 100 MG capsule Take 1 capsule (100 mg total) by mouth 2 (two) times daily.  Qty: 10 capsule, Refills: 0      phenazopyridine (PYRIDIUM) 100 MG tablet Take 1 tablet (100 mg total) by mouth 3 (three) times daily as needed.  Qty: 9 tablet, Refills: 0    Associated Diagnoses: UTI (lower urinary tract infection)      promethazine (PHENERGAN) 25 MG tablet Take 1 tablet (25 mg total) by mouth every 6 (six) hours as needed for Nausea.  Qty: 15 tablet, Refills: 0      sulfamethoxazole-trimethoprim 800-160mg (BACTRIM DS) 800-160 mg Tab Take 1 tablet by mouth 2 (two) times daily.  Qty: 14 tablet, Refills: 1    Associated Diagnoses: UTI (lower urinary tract infection)             No discharge procedures on file.    ***

## 2017-02-12 NOTE — PROGRESS NOTES
Talked to the OB/GYN resident on call to clarify the reglan and zofran orders. She said to alternate each every 4 hours.

## 2017-02-12 NOTE — PROGRESS NOTES
Pt states she feels nauseous after having eaten her meal. Zofran, po, ordered given early per Dr. Perales.

## 2017-02-12 NOTE — PLAN OF CARE
Problem: Patient Care Overview  Goal: Plan of Care Review  Outcome: Ongoing (interventions implemented as appropriate)  Pt free of trauma, falls, and injury. Pt VSS and afebrile throughout shift. Pt free of skin breakdown. Pt denies pain throughout shift. Pt is NPO and voiding adequately throughout shift. Zofran and promethazine given for N/V as needed. Purposeful rounding done.  Pt has call light in reach, bed brakes on, side rails up x2, bed in low position, TEDs/SCDs on, and nonskid socks on. Pt lying in bed in no distress. Will continue to monitor.

## 2017-02-12 NOTE — PROGRESS NOTES
"S:  Crys Peña is a 36 y.o.  female at ~10.1 weeks gestation by LMP admitted for nausea and vomiting. Patient is doing well this morning. Pt denies N/V since yesterday afternoon. Denies requiring Zofran/Phenergan since that time. Has not yet eaten.    Visit Vitals    /66 (BP Location: Right arm, Patient Position: Lying, BP Method: Automatic)    Pulse 72    Temp 98.4 °F (36.9 °C) (Oral)    Resp 18    Ht 5' 4" (1.626 m)    Wt 93.8 kg (206 lb 12.7 oz)    LMP 2016 (Exact Date)    SpO2 96%    Breastfeeding No  Comment: past 10 weeks pregnant    BMI 35.5 kg/m2     General:  alert, appears stated age and cooperative    Lungs:  no increased work of breathing    Heart:  regular rate    Abdomen: soft, nontender    Extremities negative edema, negative erythema       Assessment:  Hyperemesis gravidarum  Plan:  1. N/V  - Continue IVF  - Continue scheduled vit b6  - PO challenge this morning with plan for d/c on B6/doxylamine if tolerates PO challenge.  - Verify heart tones prior to d/c    2. UTI  - +nitrites, 2+ leuks  - UCx  - Continue Macrobid     3. IUP @ ~10.1wga by LMP  - Prenatal labs (B+, rapid HIV neg)  - Tox screen neg  - PNV  - F/u with OB/GYN  - Needs dating U/S     Mary Carmen Perales MD  PGY-2 OB/GYN  [phone]      Patient had n/v yesterday afternoon.  Doing well now.  Will write for scheduled zofran/reglan.  Continue to advance diet slowly.      Arlet Constantino   "

## 2017-02-13 VITALS
WEIGHT: 206.13 LBS | OXYGEN SATURATION: 98 % | DIASTOLIC BLOOD PRESSURE: 64 MMHG | HEIGHT: 64 IN | RESPIRATION RATE: 18 BRPM | HEART RATE: 73 BPM | TEMPERATURE: 98 F | SYSTOLIC BLOOD PRESSURE: 106 MMHG | BODY MASS INDEX: 35.19 KG/M2

## 2017-02-13 LAB
HBV SURFACE AG SERPL QL IA: NEGATIVE
RPR SER QL: NORMAL
RUBV IGG SER-ACNC: 10.4 IU/ML
RUBV IGG SER-IMP: REACTIVE

## 2017-02-13 PROCEDURE — 25000003 PHARM REV CODE 250: Performed by: OBSTETRICS & GYNECOLOGY

## 2017-02-13 PROCEDURE — 25000003 PHARM REV CODE 250: Performed by: STUDENT IN AN ORGANIZED HEALTH CARE EDUCATION/TRAINING PROGRAM

## 2017-02-13 PROCEDURE — 99217 PR OBSERVATION CARE DISCHARGE: CPT | Mod: ,,, | Performed by: OBSTETRICS & GYNECOLOGY

## 2017-02-13 PROCEDURE — G0378 HOSPITAL OBSERVATION PER HR: HCPCS

## 2017-02-13 RX ADMIN — ONDANSETRON 4 MG: 4 TABLET, ORALLY DISINTEGRATING ORAL at 06:02

## 2017-02-13 RX ADMIN — Medication 1 EACH: at 09:02

## 2017-02-13 RX ADMIN — DEXTROSE MONOHYDRATE, SODIUM CHLORIDE, AND POTASSIUM CHLORIDE: 50; 4.5; 1.49 INJECTION, SOLUTION INTRAVENOUS at 04:02

## 2017-02-13 NOTE — PROGRESS NOTES
"S:  Crys Peña is a 36 y.o.  female at ~10.2 weeks gestation by LMP admitted for nausea and vomiting. Patient is doing well this morning. Patient reportedly had an episode of emesis after lunch, then tolerated dinner and discharge orders were written. However, patient was never discharged from the floor. Patient had no N/V overnight. Denies complaints this morning.    Visit Vitals    BP 99/60 (BP Location: Right arm, Patient Position: Lying, BP Method: Automatic)    Pulse 88    Temp 98.4 °F (36.9 °C) (Oral)    Resp 18    Ht 5' 4" (1.626 m)    Wt 93.8 kg (206 lb 12.7 oz)    LMP 2016 (Exact Date)    SpO2 97%    Breastfeeding No  Comment: past 10 weeks pregnant    BMI 35.5 kg/m2     General:  alert, appears stated age and cooperative    Lungs:  no increased work of breathing    Heart:  regular rate    Abdomen: soft, nontender    Extremities negative edema, negative erythema       Assessment:  Hyperemesis gravidarum    Plan:  1. N/V  - Continue scheduled vit b6, Reglan, Zofran  - Already tolerated PO challenge    2. UTI  - +nitrites, 2+ leuks  - UCx  - Continue Macrobid     3. IUP @ ~10.2wga by LMP  - Prenatal labs (B+, rapid HIV neg)  - Tox screen neg  - PNV  - F/u with OB/GYN  - Needs dating U/S     Discharge home.    Mary Carmen Perales MD  PGY-2 OB/GYN  [phone]  "

## 2017-02-13 NOTE — NURSING
Discharge instructions given.  All questions answered.  Pt verbalized understanding.  Pt states her ride will be here within 30 minutes.

## 2017-02-13 NOTE — DISCHARGE SUMMARY
Delivery Discharge Summary  Obstetrics      Primary OB Clinician: Arlet Constantino MD    Admission date: 2/10/2017  Discharge date: 2017    Disposition: To home, self care    Admit Dx:      Patient Active Problem List   Diagnosis   (none) - all problems resolved or deleted     Discharge Dx:    Patient Active Problem List   Diagnosis   (none) - all problems resolved or deleted     Hospital Course:  Crys Peña is a 36 y.o. , who was transferred from [facility] ED on 2/10/2017 at ~8wk gestation for N/V with return to the ED. On initial assessment, vital signs were stable and physical exam was normal. Patient was admitted and started on scheduled B6 and Phenergan suppositories with Zofran PRN. Patient refused suppositories and was changed to PO Phenergan and PO Zofran PRN. She reported N/V  for which she received PO Zofran and Phenergan. She reported no further N/V overnight and was switched to scheduled PO Zofran and Reglan. She tolerated crackers and sips of water. She reportedly vomited after lunch; however tolerated dinner. Patient had no N/V overnight.  Patient also diagnosed with a UTI for which she was continued on Macrobid BID.  Pt in stable condition and ready for discharge. She has been instructed to take B6 25mg TID,  Doxylamine 12.5mg q8h, and Reglan 5mg q8h as well as Zofran 4mg q6h PRN N/V. Patient scheduled for prenatal visit with Dr. Constantino on 02/15/2017.    Pertinent studies:   Postpartum CBC  Lab Results   Component Value Date    WBC 10.08 2017    HGB 10.5 (L) 2017    HCT 31.9 (L) 2017    MCV 84 2017     2017     This patient has no babies on file.    Patient Instructions:   Current Discharge Medication List      START taking these medications    Details   metoclopramide HCl (REGLAN) 5 MG tablet Take 1 tablet (5 mg total) by mouth every 8 (eight) hours.  Qty: 20 tablet, Refills: 1      ondansetron (ZOFRAN-ODT) 4 MG TbDL Take 1 tablet (4 mg total) by  mouth every 6 (six) hours as needed (nausea or vomiting).  Qty: 60 tablet, Refills: 3      pyridoxine, vitamin B6, (B-6) 25 MG Tab Take 1 tablet (25 mg total) by mouth 3 (three) times daily.  Refills: 0         CONTINUE these medications which have NOT CHANGED    Details   methylPREDNISolone (MEDROL DOSEPACK) 4 mg tablet Take as directed on packaging  Qty: 1 Package, Refills: 0      nitrofurantoin, macrocrystal-monohydrate, (MACROBID) 100 MG capsule Take 1 capsule (100 mg total) by mouth 2 (two) times daily.  Qty: 10 capsule, Refills: 0      phenazopyridine (PYRIDIUM) 100 MG tablet Take 1 tablet (100 mg total) by mouth 3 (three) times daily as needed.  Qty: 9 tablet, Refills: 0    Associated Diagnoses: UTI (lower urinary tract infection)      promethazine (PHENERGAN) 25 MG tablet Take 1 tablet (25 mg total) by mouth every 6 (six) hours as needed for Nausea.  Qty: 15 tablet, Refills: 0      sulfamethoxazole-trimethoprim 800-160mg (BACTRIM DS) 800-160 mg Tab Take 1 tablet by mouth 2 (two) times daily.  Qty: 14 tablet, Refills: 1    Associated Diagnoses: UTI (lower urinary tract infection)         STOP taking these medications       naproxen (NAPROSYN) 500 MG tablet Comments:   Reason for Stopping:                 Discharge Procedure Orders  Diet general     Activity as tolerated     Call MD for:  temperature >100.4     Call MD for:  persistent nausea and vomiting or diarrhea     Call MD for:  severe uncontrolled pain     Call MD for:  redness, tenderness, or signs of infection (pain, swelling, redness, odor or green/yellow discharge around incision site)     Call MD for:  difficulty breathing or increased cough     Call MD for:  severe persistent headache     Call MD for:  worsening rash     Call MD for:  persistent dizziness, light-headedness, or visual disturbances     Call MD for:  increased confusion or weakness     No dressing needed       Mary Carmen Perales MD  PGY-2 OB/GYN  [phone]

## 2017-02-13 NOTE — PLAN OF CARE
D/C Planning:    Writer met with patient at bedside to discuss plan of care. Patient seen over the weekend by Stephanie LIN. Patient has no needs. DC Dispo-Home with Self Care       02/13/17 1036   Discharge Assessment   Assessment Type Discharge Planning Assessment   Assessment information obtained from? Patient   Prior to hospitilization cognitive status: Alert/Oriented   Prior to hospitalization functional status: Independent   Current cognitive status: Alert/Oriented   Current Functional Status: Independent   Lives With alone   Able to Return to Prior Arrangements yes   Is patient able to care for self after discharge? Yes   How many people do you have in your home that can help with your care after discharge? 1   Who are your caregiver(s) and their phone number(s)? Shruti Peña, mother, [phone]   Patient currently being followed by outpatient case management? No   Patient currently receives home health services? No   Does the patient currently use HME? No   Patient currently receives private duty nursing? N/A   Equipment Currently Used at Home none   Do you have any problems affording any of your prescribed medications? No   Does the patient have transportation to healthcare appointments? Yes   Transportation Available car;family or friend will provide   Discharge Plan A Home   Patient/Family In Agreement With Plan yes

## 2017-02-13 NOTE — PLAN OF CARE
Problem: Patient Care Overview  Goal: Plan of Care Review  Outcome: Ongoing (interventions implemented as appropriate)  Pt free of trauma, falls, and injury. Pt VSS and afebrile throughout shift. Pt free of skin breakdown. Pt denies pain throughout shift. Pt is eating and voiding adequately throughout shift. Zofran/METOCLOPRAMIDE  given for N/V as needed. No N/V during shift. Purposeful rounding done. Pt has call light in reach, bed brakes on, side rails up x2, bed in low position, TEDs/SCDs on, and nonskid socks on. Pt lying in bed in no distress. Will continue to monitor.

## 2017-02-15 ENCOUNTER — OFFICE VISIT (OUTPATIENT)
Dept: OBSTETRICS AND GYNECOLOGY | Facility: CLINIC | Age: 37
End: 2017-02-15
Payer: COMMERCIAL

## 2017-02-15 VITALS
HEIGHT: 64 IN | BODY MASS INDEX: 33.12 KG/M2 | DIASTOLIC BLOOD PRESSURE: 70 MMHG | SYSTOLIC BLOOD PRESSURE: 110 MMHG | WEIGHT: 194 LBS

## 2017-02-15 DIAGNOSIS — O99.019 ANEMIA AFFECTING SIXTH PREGNANCY: ICD-10-CM

## 2017-02-15 DIAGNOSIS — Z34.81 ENCOUNTER FOR SUPERVISION OF OTHER NORMAL PREGNANCY IN FIRST TRIMESTER: Primary | ICD-10-CM

## 2017-02-15 DIAGNOSIS — O09.40 ANEMIA AFFECTING SIXTH PREGNANCY: ICD-10-CM

## 2017-02-15 DIAGNOSIS — O09.521 ELDERLY MULTIGRAVIDA IN FIRST TRIMESTER: ICD-10-CM

## 2017-02-15 DIAGNOSIS — Z34.91 NORMAL PREGNANCY IN FIRST TRIMESTER: ICD-10-CM

## 2017-02-15 PROCEDURE — 88141 CYTOPATH C/V INTERPRET: CPT | Mod: ,,, | Performed by: PATHOLOGY

## 2017-02-15 PROCEDURE — 88175 CYTOPATH C/V AUTO FLUID REDO: CPT | Performed by: PATHOLOGY

## 2017-02-15 PROCEDURE — 0500F INITIAL PRENATAL CARE VISIT: CPT | Mod: S$GLB,,, | Performed by: OBSTETRICS & GYNECOLOGY

## 2017-02-15 PROCEDURE — 99999 PR PBB SHADOW E&M-EST. PATIENT-LVL III: CPT | Mod: PBBFAC,,, | Performed by: OBSTETRICS & GYNECOLOGY

## 2017-02-15 RX ORDER — FERROUS SULFATE 325(65) MG
325 TABLET ORAL DAILY
Qty: 30 TABLET | Refills: 3 | Status: SHIPPED | OUTPATIENT
Start: 2017-02-15 | End: 2017-07-17

## 2017-02-15 NOTE — PROGRESS NOTES
Initial OB appointment today.  Patient recently hospitalized for N/V of pregnancy.  Now on reglan/zofran regimen that is working.  Has had no spotting or bleeding.  No cramping.    Significant history:  - AMA- discussed maternity 21; given [place] information; told her to call and then notify me if she decides to have testing done  - Anemia- started on iron   Ultrasound today. Pap smear and GC/CT done today. Flu today.    Counseled to avoid cat litter, not garden without gloves, avoid raw meat, heat up deli meat, to eat large fish like tuna no more than once a week, and to avoid soft unpasteurized cheeses.  I recommend a PNV daily.  She should avoid ibuprofen.

## 2017-02-15 NOTE — MR AVS SNAPSHOT
[facility] - OB/GYN 5th Floor  [address]  [facility] [address]  Phone: [phone]                  Crys Peña   2/15/2017 9:00 AM   Office Visit    Description:  Female : 1980   Provider:  Arlet Constantino MD   Department:  [facility] - OB/GYN 5th Floor           Reason for Visit     Initial Prenatal Visit                To Do List           Future Appointments        Provider Department Dept Phone    2/15/2017 11:00 AM ULTRASOUND, [facility] - OB/GYN [phone]      Goals (5 Years of Data)     None      [facility]     [facility] Care [facility] -  Assistance  Registered nurses in the [facility] provide clinical advisement, health education, appointment booking, and other advisory services.  Call for this free service at [phone].             Medications                Verify that the below list of medications is an accurate representation of the medications you are currently taking.  If none reported, the list may be blank. If incorrect, please contact your healthcare provider. Carry this list with you in case of emergency.           Current Medications     metoclopramide HCl (REGLAN) 5 MG tablet Take 1 tablet (5 mg total) by mouth every 8 (eight) hours.    ondansetron (ZOFRAN-ODT) 4 MG TbDL Take 1 tablet (4 mg total) by mouth every 6 (six) hours as needed (nausea or vomiting).    promethazine (PHENERGAN) 25 MG tablet Take 1 tablet (25 mg total) by mouth every 6 (six) hours as needed for Nausea.    pyridoxine, vitamin B6, (B-6) 25 MG Tab Take 1 tablet (25 mg total) by mouth 3 (three) times daily.    methylPREDNISolone (MEDROL DOSEPACK) 4 mg tablet Take as directed on packaging    nitrofurantoin, macrocrystal-monohydrate, (MACROBID) 100 MG capsule Take 1 capsule (100 mg total) by mouth 2 (two) times daily.    phenazopyridine (PYRIDIUM) 100 MG tablet Take 1 tablet (100 mg total) by mouth 3 (three) times daily as needed.     "sulfamethoxazole-trimethoprim 800-160mg (BACTRIM DS) 800-160 mg Tab Take 1 tablet by mouth 2 (two) times daily.           Clinical Reference Information           Prenatal Vitals     Enc. Date GA Prenatal Vitals Prenatal Pulse Pain Level Urine Albumin/Glucose Edema Presentation Dilation/Effacement/Station    2/15/17  110/70 / 88 kg (194 lb 0.1 oz)           2/10/17  Admission Dx: Early stage of pregnancy Dept: [facility]       Height: 5' 4" (1.626 m)       Your Vitals Were     BP Height Weight Last Period BMI    110/70 5' 4" (1.626 m) 88 kg (194 lb 0.1 oz) 12/03/2016 (Exact Date) 33.3 kg/m2      Blood Pressure          Most Recent Value    BP  110/70      Allergies as of 2/15/2017     No Known Allergies      Immunizations Administered on Date of Encounter - 2/15/2017     None      Language Assistance Services     ATTENTION: Language assistance services are available, free of charge. Please call [phone].      ATENCIÓN: Si habla josephineradha, tiene a posada disposición servicios gratuitos de asistencia lingüística. Llame al [phone].     STIVEN Ý: N?u b?n nói Ti?ng Vi?t, có các d?ch v? h? tr? ngôn ng? mi?n phí dành cho b?n. G?i s? [phone].         [facility] - OB/GYN 5th Floor complies with applicable Federal civil rights laws and does not discriminate on the basis of race, color, national origin, age, disability, or sex.        "

## 2017-02-16 LAB
C TRACH DNA SPEC QL NAA+PROBE: NEGATIVE
N GONORRHOEA DNA SPEC QL NAA+PROBE: NEGATIVE

## 2017-03-03 ENCOUNTER — PATIENT MESSAGE (OUTPATIENT)
Dept: OBSTETRICS AND GYNECOLOGY | Facility: CLINIC | Age: 37
End: 2017-03-03

## 2017-03-03 NOTE — TELEPHONE ENCOUNTER
Spoke to patient and she states she is vomiting for 2 days. She denies weakness and lightheadedness. Advised to go to ER if this occurs. States she is drinking fluids and zofran has not help. Advised to eat bland foods and lighter meals to ease nausea

## 2017-03-14 ENCOUNTER — TELEPHONE (OUTPATIENT)
Dept: OBSTETRICS AND GYNECOLOGY | Facility: CLINIC | Age: 37
End: 2017-03-14

## 2017-03-14 NOTE — TELEPHONE ENCOUNTER
----- Message from Mahendra Rey sent at 3/14/2017  1:21 PM CDT -----  Contact: Pt  X_ 1st Request  _ 2nd Request  _ 3rd Request    Who:SANTA PITTS [[id number]]    Why: Patient states she would like to talk to staff about an appointment she was suppose to have tomorrow at 3.    What Number to Call Back: [phone]    When to Expect a call back: (Before the end of the day)  -- if call after 3:00 call back will be tomorrow.

## 2017-03-15 ENCOUNTER — ROUTINE PRENATAL (OUTPATIENT)
Dept: OBSTETRICS AND GYNECOLOGY | Facility: CLINIC | Age: 37
End: 2017-03-15
Payer: COMMERCIAL

## 2017-03-15 VITALS
DIASTOLIC BLOOD PRESSURE: 68 MMHG | SYSTOLIC BLOOD PRESSURE: 116 MMHG | BODY MASS INDEX: 33.76 KG/M2 | WEIGHT: 196.63 LBS

## 2017-03-15 DIAGNOSIS — Z34.92 NORMAL PREGNANCY, SECOND TRIMESTER: Primary | ICD-10-CM

## 2017-03-15 DIAGNOSIS — O21.9 NAUSEA/VOMITING IN PREGNANCY: ICD-10-CM

## 2017-03-15 PROCEDURE — 0502F SUBSEQUENT PRENATAL CARE: CPT | Mod: S$GLB,,, | Performed by: OBSTETRICS & GYNECOLOGY

## 2017-03-15 PROCEDURE — 99999 PR PBB SHADOW E&M-EST. PATIENT-LVL II: CPT | Mod: PBBFAC,,, | Performed by: OBSTETRICS & GYNECOLOGY

## 2017-03-15 RX ORDER — PROMETHAZINE HYDROCHLORIDE 25 MG/1
25 SUPPOSITORY RECTAL EVERY 6 HOURS PRN
Qty: 12 SUPPOSITORY | Refills: 1 | Status: SHIPPED | OUTPATIENT
Start: 2017-03-15 | End: 2017-04-12

## 2017-03-15 RX ORDER — DOXYLAMINE SUCCINATE AND PYRIDOXINE HYDROCHLORIDE, DELAYED RELEASE TABLETS 10 MG/10 MG 10; 10 MG/1; MG/1
TABLET, DELAYED RELEASE ORAL
Qty: 60 TABLET | Refills: 3 | Status: SHIPPED | OUTPATIENT
Start: 2017-03-15 | End: 2017-04-12

## 2017-03-15 NOTE — PROGRESS NOTES
Patient missed her ultrasound appointment last visit.  Will reschedule with [facility].  AMA- called insurance and Materniti 21 is not covered.  Will proceed with quad screening.  Is taking prenatal vitamin.  No cramping or bleeding.  Reports nausea has returned about 2 weeks ago.  Is having trouble keeping food down.  Stopped reglan.  Sent phenergan suppository and diclegis.  Instructed on use of medication.

## 2017-03-15 NOTE — MR AVS SNAPSHOT
[facility] - OB/GYN 5th Floor  [address]  [facility] [address]  Phone: [phone]                  Crys Peña   3/15/2017 3:15 PM   Routine Prenatal    Description:  Female : 1980   Provider:  Arlet Constantino MD   Department:  [facility] - OB/GYN 5th Floor           Reason for Visit     Routine Prenatal Visit                To Do List           Future Appointments        Provider Department Dept Phone    3/15/2017 3:15 PM Arlet Constantino MD [facility] OB/GYN 5th Floor [phone]      Goals (5 Years of Data)     None      [facility]     [facility] Care [facility] -  Assistance  Registered nurses in the [facility] provide clinical advisement, health education, appointment booking, and other advisory services.  Call for this free service at [phone].             Medications                Verify that the below list of medications is an accurate representation of the medications you are currently taking.  If none reported, the list may be blank. If incorrect, please contact your healthcare provider. Carry this list with you in case of emergency.           Current Medications     ferrous sulfate 325 mg (65 mg iron) Tab tablet Take 1 tablet (325 mg total) by mouth once daily.    metoclopramide HCl (REGLAN) 5 MG tablet Take 1 tablet (5 mg total) by mouth every 8 (eight) hours.    ondansetron (ZOFRAN-ODT) 4 MG TbDL Take 1 tablet (4 mg total) by mouth every 6 (six) hours as needed (nausea or vomiting).    prenatal #108-iron,carbonyl-FA 30-1 mg Tab Take by mouth once daily.    pyridoxine, vitamin B6, (B-6) 25 MG Tab Take 1 tablet (25 mg total) by mouth 3 (three) times daily.           Clinical Reference Information           Prenatal Vitals     Enc. Date GA Prenatal Vitals Prenatal Pulse Pain Level Urine Albumin/Glucose Edema Presentation Dilation/Effacement/Station    3/15/17 14w4d 116/68 / 89.2 kg (196 lb 10.4 oz)  /  / Absent  0 Negative / Negative    "    2/15/17 10w4d 110/70 / 88 kg (194 lb 0.1 oz)           2/10/17 9w6d Admission Dx: Early stage of pregnancy Dept: [facility]       TWG: 3.017 kg (6 lb 10.4 oz)   Pregravid weight: 86.2 kg (190 lb)   Number of fetuses: 1   Height: 5' 4" (1.626 m)   BMI: 32.6       Your Vitals Were     BP Weight Last Period BMI       116/68 89.2 kg (196 lb 10.4 oz) 12/03/2016 33.76 kg/m2       Allergies as of 3/15/2017     No Known Allergies      Immunizations Administered on Date of Encounter - 3/15/2017     None      Language Assistance Services     ATTENTION: Language assistance services are available, free of charge. Please call [phone].      ATENCIÓN: Si habla akosua, tiene a posada disposición servicios gratuitos de asistencia lingüística. Llame al [phone].     STIVEN Ý: N?u b?n nói Ti?ng Vi?t, có các d?ch v? h? tr? ngôn ng? mi?n phí dành cho b?n. G?i s? [phone].         [facility] - OB/GYN 5th Floor complies with applicable Federal civil rights laws and does not discriminate on the basis of race, color, national origin, age, disability, or sex.        "

## 2017-03-16 ENCOUNTER — PATIENT MESSAGE (OUTPATIENT)
Dept: OBSTETRICS AND GYNECOLOGY | Facility: CLINIC | Age: 37
End: 2017-03-16

## 2017-04-06 ENCOUNTER — OFFICE VISIT (OUTPATIENT)
Dept: MATERNAL FETAL MEDICINE | Facility: CLINIC | Age: 37
End: 2017-04-06
Payer: COMMERCIAL

## 2017-04-06 DIAGNOSIS — Z34.92 NORMAL PREGNANCY, SECOND TRIMESTER: ICD-10-CM

## 2017-04-06 PROCEDURE — 76805 OB US >/= 14 WKS SNGL FETUS: CPT | Mod: S$GLB,,, | Performed by: OBSTETRICS & GYNECOLOGY

## 2017-04-06 PROCEDURE — 99499 UNLISTED E&M SERVICE: CPT | Mod: S$GLB,,, | Performed by: OBSTETRICS & GYNECOLOGY

## 2017-04-12 ENCOUNTER — ROUTINE PRENATAL (OUTPATIENT)
Dept: OBSTETRICS AND GYNECOLOGY | Facility: CLINIC | Age: 37
End: 2017-04-12
Payer: COMMERCIAL

## 2017-04-12 VITALS
SYSTOLIC BLOOD PRESSURE: 110 MMHG | WEIGHT: 201.13 LBS | DIASTOLIC BLOOD PRESSURE: 78 MMHG | BODY MASS INDEX: 34.52 KG/M2

## 2017-04-12 DIAGNOSIS — Z34.91 NORMAL PREGNANCY IN FIRST TRIMESTER: Primary | ICD-10-CM

## 2017-04-12 DIAGNOSIS — O09.521 ELDERLY MULTIGRAVIDA IN FIRST TRIMESTER: ICD-10-CM

## 2017-04-12 PROCEDURE — 99999 PR PBB SHADOW E&M-EST. PATIENT-LVL II: CPT | Mod: PBBFAC,,, | Performed by: OBSTETRICS & GYNECOLOGY

## 2017-04-12 PROCEDURE — 0502F SUBSEQUENT PRENATAL CARE: CPT | Mod: S$GLB,,, | Performed by: OBSTETRICS & GYNECOLOGY

## 2017-04-12 NOTE — PROGRESS NOTES
Good fetal movement.  No contractions, no vaginal bleeding, and no loss of fluid.  Nausea and vomiting is resolved.  BTL paper signed today.  Will be getting medicaid.    Ultrasound-- patient missed 14 week ultrasound.  Dates confirmed on 17 week ultrasound; however, bedside ultrasounds were done at ~10 weeks.

## 2017-05-11 ENCOUNTER — ROUTINE PRENATAL (OUTPATIENT)
Dept: OBSTETRICS AND GYNECOLOGY | Facility: CLINIC | Age: 37
End: 2017-05-11
Payer: COMMERCIAL

## 2017-05-11 VITALS
DIASTOLIC BLOOD PRESSURE: 80 MMHG | BODY MASS INDEX: 34.32 KG/M2 | SYSTOLIC BLOOD PRESSURE: 110 MMHG | WEIGHT: 199.94 LBS

## 2017-05-11 DIAGNOSIS — O09.521 ELDERLY MULTIGRAVIDA IN FIRST TRIMESTER: Primary | ICD-10-CM

## 2017-05-11 PROCEDURE — 0502F SUBSEQUENT PRENATAL CARE: CPT | Mod: S$GLB,,, | Performed by: OBSTETRICS & GYNECOLOGY

## 2017-05-11 PROCEDURE — 99999 PR PBB SHADOW E&M-EST. PATIENT-LVL II: CPT | Mod: PBBFAC,,, | Performed by: OBSTETRICS & GYNECOLOGY

## 2017-05-11 NOTE — MR AVS SNAPSHOT
[facility] [address]  [address] [facility]  [address]  [place] [address]  Phone: [phone]  Fax: [phone]                  Crys Peña   2017 10:45 AM   Routine Prenatal    Description:  Female : 1980   Provider:  Arlet Constantino MD   Department:  [facility] [address]           Reason for Visit     Routine Prenatal Visit                To Do List           Future Appointments        Provider Department Dept Phone    2017 9:40 AM ULTRASOUND, [facility] 4TH FLR CLINIC [facility] [phone]    2017 10:30 AM Arlet Constantino MD [facility] [address] [phone]      Goals (5 Years of Data)     None      [facility] On [facility]     [facility] Nurse Care [facility] -  Assistance  Unless otherwise directed by your provider, please contact [facility]-Call, our nurse care line that is available for  assistance.     Registered nurses in the [facility] Call Center provide: appointment scheduling, clinical advisement, health education, and other advisory services.  Call: [phone] (toll free)               Medications                Verify that the below list of medications is an accurate representation of the medications you are currently taking.  If none reported, the list may be blank. If incorrect, please contact your healthcare provider. Carry this list with you in case of emergency.           Current Medications     ferrous sulfate 325 mg (65 mg iron) Tab tablet Take 1 tablet (325 mg total) by mouth once daily.    prenatal #108-iron,carbonyl-FA 30-1 mg Tab Take by mouth once daily.    pyridoxine, vitamin B6, (B-6) 25 MG Tab Take 1 tablet (25 mg total) by mouth 3 (three) times daily.           Clinical Reference Information           Prenatal Vitals     Enc. Date GA Prenatal Vitals Prenatal Pulse Pain Level Urine Albumin/Glucose Edema Presentation Dilation/Effacement/Station    17 22w5d 110/80 / 90.7 kg (199 lb 15.3 oz)  /  / Present   "0        17 18w4d 110/78 / 91.2 kg (201 lb 1.6 oz)  / +++ / Present  0 Negative / Negative       3/15/17 14w4d 116/68 / 89.2 kg (196 lb 10.4 oz)  / +++ / Absent  0 Negative / Negative       2/15/17 10w4d 110/70 / 88 kg (194 lb 0.1 oz)           2/10/17 9w6d Admission Dx: Early stage of pregnancy Dept: [facility] MEDSUR       TW.517 kg (9 lb 15.3 oz)   Pregravid weight: 86.2 kg (190 lb)   Number of fetuses: 1   Height: 5' 4" (1.626 m)   BMI: 32.6       Your Vitals Were     BP Weight Last Period BMI       110/80 90.7 kg (199 lb 15.3 oz) 2016 34.32 kg/m2       Allergies as of 2017     No Known Allergies      Immunizations Administered on Date of Encounter - 2017     None      Language Assistance Services     ATTENTION: Language assistance services are available, free of charge. Please call [phone].      ATENCIÓN: Si habla español, tiene a posada disposición servicios gratuitos de asistencia lingüística. Llame al [phone].     CHÚ Ý: N?u b?n nói Ti?ng Vi?t, có các d?ch v? h? tr? ngôn ng? mi?n phí dành cho b?n. G?i s? [phone].         [facility] [address] complies with applicable Federal civil rights laws and does not discriminate on the basis of race, color, national origin, age, disability, or sex.        "

## 2017-05-11 NOTE — PROGRESS NOTES
Good fetal movement.  No contractions, no vaginal bleeding, and no loss of fluid.  Ob glucose/cbc next visit.

## 2017-05-18 ENCOUNTER — OFFICE VISIT (OUTPATIENT)
Dept: MATERNAL FETAL MEDICINE | Facility: CLINIC | Age: 37
End: 2017-05-18
Payer: COMMERCIAL

## 2017-05-18 DIAGNOSIS — O09.522 ELDERLY MULTIGRAVIDA IN SECOND TRIMESTER: ICD-10-CM

## 2017-05-18 DIAGNOSIS — O36.5920 IUGR (INTRAUTERINE GROWTH RESTRICTION) AFFECTING CARE OF MOTHER, SECOND TRIMESTER, NOT APPLICABLE OR UNSPECIFIED FETUS: ICD-10-CM

## 2017-05-18 DIAGNOSIS — Z36.89 ENCOUNTER FOR ULTRASOUND TO CHECK FETAL GROWTH: Primary | ICD-10-CM

## 2017-05-18 PROCEDURE — 76811 OB US DETAILED SNGL FETUS: CPT | Mod: 26,S$GLB,, | Performed by: OBSTETRICS & GYNECOLOGY

## 2017-05-18 PROCEDURE — 99241 PR OFFICE CONSULTATION,LEVEL I: CPT | Mod: 25,S$GLB,, | Performed by: OBSTETRICS & GYNECOLOGY

## 2017-05-18 NOTE — PROGRESS NOTES
OB ultrasound (see full report under imaging tab in EPIC)  A detailed fetal anatomic ultrasound examination was performed for the following high risk indication: advanced maternal age. No fetal structural malformations are identified; however, fetal imaging is incomplete today. A follow-up study will be scheduled in 4 weeks to complete the fetal anatomic survey and assess fetal growth.  Fetal size today is somewhat smaller than expected with the EFW plotting at the 9th%.   Cervical length is normal. Placental location is normal without evidence of previa.    Consultation:  I spent approximately 15 minutes in face to face time with the patient and her family, greater than 50% of which was in counseling and care coordination.   The finding of estimated fetal weight at the 9th% was reviewed. We discussed that possible explanations for this finding include true fetal growth restriction or normal variation (the patient and her partner are of relatively small stature). Fetal growth restriction has many possible causes including maternal disease, placental dysfunction, or intrinsic fetal abnormalities, such as chromsomal or genetic abnormalities or congenital infection. In the absence of other sonographic abnormalities, a fetal genetic/chromosomal abnormality or congenital infection are unlikely but cannot be completely excluded.  Options for further testing were briefly reviewed, but the patient indicated that she did not wish to pursue any testing at this time.  Will reassess fetal size/growth at the next study in 4 weeks.

## 2017-06-08 ENCOUNTER — LAB VISIT (OUTPATIENT)
Dept: LAB | Facility: OTHER | Age: 37
End: 2017-06-08
Attending: OBSTETRICS & GYNECOLOGY
Payer: COMMERCIAL

## 2017-06-08 ENCOUNTER — ROUTINE PRENATAL (OUTPATIENT)
Dept: OBSTETRICS AND GYNECOLOGY | Facility: CLINIC | Age: 37
End: 2017-06-08
Payer: COMMERCIAL

## 2017-06-08 VITALS
DIASTOLIC BLOOD PRESSURE: 80 MMHG | WEIGHT: 200.81 LBS | SYSTOLIC BLOOD PRESSURE: 100 MMHG | BODY MASS INDEX: 34.47 KG/M2

## 2017-06-08 DIAGNOSIS — O09.521 ELDERLY MULTIGRAVIDA IN FIRST TRIMESTER: Primary | ICD-10-CM

## 2017-06-08 DIAGNOSIS — R82.998 LEUKOCYTES IN URINE: ICD-10-CM

## 2017-06-08 DIAGNOSIS — O09.521 ELDERLY MULTIGRAVIDA IN FIRST TRIMESTER: ICD-10-CM

## 2017-06-08 LAB
BASOPHILS # BLD AUTO: 0.02 K/UL
BASOPHILS NFR BLD: 0.2 %
DIFFERENTIAL METHOD: ABNORMAL
EOSINOPHIL # BLD AUTO: 0.1 K/UL
EOSINOPHIL NFR BLD: 1.1 %
ERYTHROCYTE [DISTWIDTH] IN BLOOD BY AUTOMATED COUNT: 13.9 %
GLUCOSE SERPL-MCNC: 104 MG/DL
HCT VFR BLD AUTO: 33.7 %
HGB BLD-MCNC: 10.8 G/DL
LYMPHOCYTES # BLD AUTO: 2.2 K/UL
LYMPHOCYTES NFR BLD: 20.1 %
MCH RBC QN AUTO: 27.3 PG
MCHC RBC AUTO-ENTMCNC: 32 %
MCV RBC AUTO: 85 FL
MONOCYTES # BLD AUTO: 0.8 K/UL
MONOCYTES NFR BLD: 7.1 %
NEUTROPHILS # BLD AUTO: 7.7 K/UL
NEUTROPHILS NFR BLD: 70.9 %
PLATELET # BLD AUTO: 369 K/UL
PMV BLD AUTO: 10.2 FL
RBC # BLD AUTO: 3.96 M/UL
WBC # BLD AUTO: 10.93 K/UL

## 2017-06-08 PROCEDURE — 36415 COLL VENOUS BLD VENIPUNCTURE: CPT

## 2017-06-08 PROCEDURE — 82950 GLUCOSE TEST: CPT

## 2017-06-08 PROCEDURE — 85025 COMPLETE CBC W/AUTO DIFF WBC: CPT

## 2017-06-08 PROCEDURE — 0502F SUBSEQUENT PRENATAL CARE: CPT | Mod: S$GLB,,, | Performed by: OBSTETRICS & GYNECOLOGY

## 2017-06-08 PROCEDURE — 99999 PR PBB SHADOW E&M-EST. PATIENT-LVL I: CPT | Mod: PBBFAC,,, | Performed by: OBSTETRICS & GYNECOLOGY

## 2017-06-08 RX ORDER — NITROFURANTOIN 25; 75 MG/1; MG/1
100 CAPSULE ORAL 2 TIMES DAILY
Qty: 14 CAPSULE | Refills: 0 | Status: SHIPPED | OUTPATIENT
Start: 2017-06-08 | End: 2017-06-15

## 2017-06-08 NOTE — PROGRESS NOTES
Good fetal movement.  No contractions, no vaginal bleeding, and no loss of fluid.  Patient having headaches.  Has taken one regular strength tylenol each day for the past two days.  Discussed the toxic dose of tylenol and the amount she could take in 24 hours.  reocmmended extra strength tylenol and another extra strength if no resoulation 6 hours later.   Urine culture ordered and macrobid sent to pharmacy for leukocytes and nitrites in urine.

## 2017-06-11 LAB — BACTERIA UR CULT: NORMAL

## 2017-06-22 ENCOUNTER — OFFICE VISIT (OUTPATIENT)
Dept: MATERNAL FETAL MEDICINE | Facility: CLINIC | Age: 37
End: 2017-06-22
Payer: COMMERCIAL

## 2017-06-22 DIAGNOSIS — Z36.89 ENCOUNTER FOR ULTRASOUND TO CHECK FETAL GROWTH: ICD-10-CM

## 2017-06-22 DIAGNOSIS — O09.523 ELDERLY MULTIGRAVIDA, THIRD TRIMESTER: ICD-10-CM

## 2017-06-22 PROCEDURE — 99499 UNLISTED E&M SERVICE: CPT | Mod: S$GLB,,, | Performed by: OBSTETRICS & GYNECOLOGY

## 2017-06-22 PROCEDURE — 76816 OB US FOLLOW-UP PER FETUS: CPT | Mod: S$GLB,,, | Performed by: OBSTETRICS & GYNECOLOGY

## 2017-06-22 NOTE — LETTER
June 22, 2017      Arlet Constantino MD  [address] [facility] [address]           [facility]  [address]  [facility] [address]  Phone: [phone]          Patient: Crys Peña   MR Number: [id number]   YOB: 1980   Date of Visit: 6/22/2017       Dear Dr. Arlet Constantino:    Thank you for referring Crys Peña to me for evaluation. Attached you will find relevant portions of my assessment and plan of care.    If you have questions, please do not hesitate to call me. I look forward to following Crys Peña along with you.    Sincerely,    Chely Loredo MD    Enclosure  CC:  No Recipients    If you would like to receive this communication electronically, please contact [email or web address] or [phone] to request more information on [facility] Link access.    For providers and/or their staff who would like to refer a patient to [facility], please contact us through our one-stop-shop provider referral line, [facility], at [phone].    If you feel you have received this communication in error or would no longer like to receive these types of communications, please e-mail [email or web address]

## 2017-06-22 NOTE — PROGRESS NOTES
OB Ultrasound Report (see PDF version under imaging tab)    Indication  ========    Evaluation of fetal growth.    History  ======    Risk Factors  History risk factors: AMA    Method  ======    Transabdominal ultrasound examination.    Pregnancy  =========    [place] pregnancy. Number of fetuses: 1.    Dating  ======    LMP on: 12/6/2016  Cycle: regular cycle  GA by LMP 28 w + 2 d  DAYANA by LMP: 9/12/2017  Ultrasound examination on: 6/22/2017  GA by U/S based upon: AC, BPD, Femur, HC  GA by U/S 27 w + 3 d  DAYANA by U/S: 9/18/2017  Assigned: Dating performed on 04/6/2017, based on the LMP  Assigned GA 28 w + 2 d  Assigned DAYANA: 9/12/2017    General Evaluation  ===============    Cardiac activity: present.  bpm.  Fetal movements: visualized.  Presentation: breech.  Placenta: anterior.  Umbilical cord: 3 vessel cord.  Amniotic fluid: MVP 5.6 cm.    Fetal Biometry  ===========    Fetal Biometry  BPD 68.2 mm 27w 3d Hadlock  OFD 94.1 mm 30w 3d Raciel  .3 mm 28w 5d Hadlock  .3 mm 27w 3d Hadlock  Femur 47.9 mm 26w 0d Hadlock  Humerus 44.6 mm 26w 3d Raciel  EFW 1,019 g 24% Ford  Calculated by: Hadlock (BPD-HC-AC-FL)  EFW (lb) 2 lb  EFW (oz) 4 oz  Cephalic index 0.72  HC / AC 1.14  FL / BPD 0.70  FL / AC 0.21  MVP 5.6 cm   bpm  Head / Face / Neck   5.5 mm    Fetal Anatomy  ===========    Cranium: normal  Lateral ventricles: normal  Choroid plexus: normal  Midline falx: normal  Cavum septi pellucidi: normal  Cerebellum: normal  Cisterna magna: normal  4-chamber view: 4-chamber previously documented, septum suboptimal  Situs: normal  Ductal arch: normal  SVC: normal  IVC: normal  3-vessel-trachea view: suboptimal  Stomach: normal  Kidneys: normal  Bladder: normal  Arms: documented previously  Legs: documented previously  Gender: female  Wants to know gender: yes    Impression  =========    The EFW plots at the 24th% today. However, the femur length lags by 2+ weeks, plotting at the 1st %. The long  bones are normal in  appearance. This may be a constitutional variation; however, recommend and will schedule a f/u exam in 4 weeks to reassess femur  length and overall fetal growth.  Most of the anatomic survey is completed today; however, visualization of the heart remains somewhat limited. Will reassess when the  patient returns in 4 weeks.  Findings from today's exam and f/u plan discussed with the patient today.

## 2017-07-06 ENCOUNTER — ROUTINE PRENATAL (OUTPATIENT)
Dept: OBSTETRICS AND GYNECOLOGY | Facility: CLINIC | Age: 37
End: 2017-07-06
Payer: COMMERCIAL

## 2017-07-06 VITALS — SYSTOLIC BLOOD PRESSURE: 100 MMHG | BODY MASS INDEX: 34.25 KG/M2 | WEIGHT: 199.5 LBS | DIASTOLIC BLOOD PRESSURE: 70 MMHG

## 2017-07-06 DIAGNOSIS — Z34.91 NORMAL PREGNANCY IN FIRST TRIMESTER: Primary | ICD-10-CM

## 2017-07-06 DIAGNOSIS — O35.HXX0 SHORT FEMUR OF FETUS ON PRENATAL ULTRASOUND: ICD-10-CM

## 2017-07-06 DIAGNOSIS — O12.13 GESTATIONAL PROTEINURIA IN THIRD TRIMESTER: ICD-10-CM

## 2017-07-06 PROCEDURE — 87086 URINE CULTURE/COLONY COUNT: CPT

## 2017-07-06 PROCEDURE — 87077 CULTURE AEROBIC IDENTIFY: CPT

## 2017-07-06 PROCEDURE — 0502F SUBSEQUENT PRENATAL CARE: CPT | Mod: S$GLB,,, | Performed by: OBSTETRICS & GYNECOLOGY

## 2017-07-06 PROCEDURE — 87088 URINE BACTERIA CULTURE: CPT

## 2017-07-06 PROCEDURE — 99999 PR PBB SHADOW E&M-EST. PATIENT-LVL III: CPT | Mod: PBBFAC,,, | Performed by: OBSTETRICS & GYNECOLOGY

## 2017-07-06 PROCEDURE — 87186 SC STD MICRODIL/AGAR DIL: CPT

## 2017-07-06 NOTE — PROGRESS NOTES
Good fetal movement.  No contractions, no vaginal bleeding, and no loss of fluid.  Some round ligament pain.  Prenatal testing to start with next visit.  Repeat ultrasound scheduled for short femur length.  Tdap ordered for next visit.  Urine culture ordered- nitrites in urine.

## 2017-07-10 ENCOUNTER — PATIENT MESSAGE (OUTPATIENT)
Dept: OBSTETRICS AND GYNECOLOGY | Facility: CLINIC | Age: 37
End: 2017-07-10

## 2017-07-10 DIAGNOSIS — N30.00 ACUTE CYSTITIS WITHOUT HEMATURIA: Primary | ICD-10-CM

## 2017-07-10 LAB — BACTERIA UR CULT: NORMAL

## 2017-07-10 RX ORDER — NITROFURANTOIN 25; 75 MG/1; MG/1
100 CAPSULE ORAL 2 TIMES DAILY
Qty: 14 CAPSULE | Refills: 0 | Status: SHIPPED | OUTPATIENT
Start: 2017-07-10 | End: 2017-07-17

## 2017-07-17 ENCOUNTER — CLINICAL SUPPORT (OUTPATIENT)
Dept: OBSTETRICS AND GYNECOLOGY | Facility: CLINIC | Age: 37
End: 2017-07-17
Payer: COMMERCIAL

## 2017-07-17 ENCOUNTER — ROUTINE PRENATAL (OUTPATIENT)
Dept: OBSTETRICS AND GYNECOLOGY | Facility: CLINIC | Age: 37
End: 2017-07-17
Payer: COMMERCIAL

## 2017-07-17 ENCOUNTER — TELEPHONE (OUTPATIENT)
Dept: OBSTETRICS AND GYNECOLOGY | Facility: CLINIC | Age: 37
End: 2017-07-17

## 2017-07-17 VITALS
WEIGHT: 200.63 LBS | DIASTOLIC BLOOD PRESSURE: 72 MMHG | BODY MASS INDEX: 34.44 KG/M2 | SYSTOLIC BLOOD PRESSURE: 124 MMHG

## 2017-07-17 DIAGNOSIS — N39.0 URINARY TRACT INFECTION WITHOUT HEMATURIA, SITE UNSPECIFIED: Primary | ICD-10-CM

## 2017-07-17 DIAGNOSIS — O23.43: ICD-10-CM

## 2017-07-17 DIAGNOSIS — O09.523 ELDERLY MULTIGRAVIDA, THIRD TRIMESTER: ICD-10-CM

## 2017-07-17 DIAGNOSIS — Z23 NEED FOR TDAP VACCINATION: Primary | ICD-10-CM

## 2017-07-17 PROBLEM — O09.529 ELDERLY MULTIGRAVIDA: Status: ACTIVE | Noted: 2017-02-15

## 2017-07-17 PROCEDURE — 0502F SUBSEQUENT PRENATAL CARE: CPT | Mod: S$GLB,,, | Performed by: NURSE PRACTITIONER

## 2017-07-17 PROCEDURE — 99999 PR PBB SHADOW E&M-EST. PATIENT-LVL I: CPT | Mod: PBBFAC,,,

## 2017-07-17 PROCEDURE — 90471 IMMUNIZATION ADMIN: CPT | Mod: S$GLB,,, | Performed by: OBSTETRICS & GYNECOLOGY

## 2017-07-17 PROCEDURE — 90715 TDAP VACCINE 7 YRS/> IM: CPT | Mod: S$GLB,,, | Performed by: OBSTETRICS & GYNECOLOGY

## 2017-07-17 PROCEDURE — 99999 PR PBB SHADOW E&M-EST. PATIENT-LVL II: CPT | Mod: PBBFAC,,, | Performed by: NURSE PRACTITIONER

## 2017-07-17 NOTE — PROGRESS NOTES
Denies cxtns, lof, vb; PTL/FMC/PIH precautions; Has tried Tums for heartburn and OTC Zantac OK; Diet reviewed;  Plans to breast feed; Has finished antibiotics for asymptomatic UTI and CC U/A and culture sent;  Start PNT;  F/U 7-20-17 for MFM US and in 2 weeks for visit.

## 2017-07-18 LAB
BACTERIA #/AREA URNS AUTO: ABNORMAL /HPF
BILIRUB UR QL STRIP: NEGATIVE
CLARITY UR REFRACT.AUTO: ABNORMAL
COLOR UR AUTO: ABNORMAL
GLUCOSE UR QL STRIP: NEGATIVE
HGB UR QL STRIP: ABNORMAL
KETONES UR QL STRIP: NEGATIVE
LEUKOCYTE ESTERASE UR QL STRIP: NEGATIVE
MICROSCOPIC COMMENT: ABNORMAL
NITRITE UR QL STRIP: NEGATIVE
PH UR STRIP: 6 [PH] (ref 5–8)
PROT UR QL STRIP: NEGATIVE
RBC #/AREA URNS AUTO: 1 /HPF (ref 0–4)
SP GR UR STRIP: 1.02 (ref 1–1.03)
SQUAMOUS #/AREA URNS AUTO: 1 /HPF
URN SPEC COLLECT METH UR: ABNORMAL
UROBILINOGEN UR STRIP-ACNC: 4 EU/DL
WBC #/AREA URNS AUTO: 3 /HPF (ref 0–5)

## 2017-07-19 LAB
BACTERIA UR CULT: NORMAL
BACTERIA UR CULT: NORMAL

## 2017-07-20 ENCOUNTER — OFFICE VISIT (OUTPATIENT)
Dept: MATERNAL FETAL MEDICINE | Facility: CLINIC | Age: 37
End: 2017-07-20
Attending: OBSTETRICS & GYNECOLOGY
Payer: COMMERCIAL

## 2017-07-20 ENCOUNTER — HOSPITAL ENCOUNTER (OUTPATIENT)
Dept: PERINATAL CARE | Facility: OTHER | Age: 37
Discharge: HOME | End: 2017-07-20
Attending: ADVANCED PRACTICE MIDWIFE
Payer: COMMERCIAL

## 2017-07-20 DIAGNOSIS — O09.523 ADVANCED MATERNAL AGE IN MULTIGRAVIDA, THIRD TRIMESTER: ICD-10-CM

## 2017-07-20 DIAGNOSIS — O09.529 AMA (ADVANCED MATERNAL AGE) MULTIGRAVIDA 35+, UNSPECIFIED TRIMESTER: Primary | ICD-10-CM

## 2017-07-20 DIAGNOSIS — Z36.89 ENCOUNTER FOR ULTRASOUND TO CHECK FETAL GROWTH: ICD-10-CM

## 2017-07-20 DIAGNOSIS — O35.HXX0 SHORT FEMUR OF FETUS ON PRENATAL ULTRASOUND: ICD-10-CM

## 2017-07-20 DIAGNOSIS — O09.523 ELDERLY MULTIGRAVIDA, THIRD TRIMESTER: ICD-10-CM

## 2017-07-20 PROCEDURE — 99499 UNLISTED E&M SERVICE: CPT | Mod: S$GLB,,, | Performed by: OBSTETRICS & GYNECOLOGY

## 2017-07-20 PROCEDURE — 76818 FETAL BIOPHYS PROFILE W/NST: CPT

## 2017-07-20 PROCEDURE — 76816 OB US FOLLOW-UP PER FETUS: CPT | Mod: S$GLB,,, | Performed by: OBSTETRICS & GYNECOLOGY

## 2017-07-20 NOTE — PROGRESS NOTES
Indication  ========    sono for interval fetal growth and f/u of femur (Dr. Constantino).    History  ======    Risk Factors  History risk factors: AMA    Method  ======    [place] E10, Transabdominal ultrasound examination. View: Good view.    Pregnancy  =========    [place] pregnancy. Number of fetuses: 1.    Dating  ======    Cycle: regular cycle  Ultrasound examination on: 7/20/2017  GA by U/S based upon: AC, BPD, Femur, HC  GA by U/S 31 w + 0 d  DAYANA by U/S: 9/21/2017  Assigned: Dating performed on 04/6/2017, based on the LMP  Assigned GA 32 w + 2 d  Assigned DAYANA: 9/12/2017    General Evaluation  ==============    Cardiac activity: present.  bpm.  Fetal movements: visualized.  Presentation: cephalic right.  Placenta: anterior.  Umbilical cord: 3 vessel cord.  Amniotic fluid: Amount of AF: normal amount. MVP 3.7 cm.    Fetal Biometry  ============    Fetal Biometry  BPD 76.6 mm 30w 5d Hadlock  .5 mm 33w 2d Raciel  .7 mm 31w 5d Hadlock  .7 mm 31w 4d Hadlock  Femur 57.4 mm 30w 1d Hadlock  Humerus 50.9 mm 29w 6d Raciel  EFW 1,689 g 15% Ford  Calculated by: Hadlock (BPD-HC-AC-FL)  EFW (lb) 3 lb  EFW (oz) 12 oz  Cephalic index 0.75  HC / AC 1.05  FL / BPD 0.75  FL / AC 0.21  MVP 3.7 cm   bpm  Extremities / Bony Struc  Radius 43.0 mm  Ulna 47.3 mm 30w 0d Raciel  Tibia 51.0 mm 30w 3d Raciel  Fibula 48.5 mm 29w 5d Raciel    Fetal Anatomy  ===========    Cranium: normal  Posterior fossa: normal  4-chamber view: normal  Stomach: normal  Kidneys: normal  Bladder: normal  Arms: documented previously  Legs: documented previously  Gender: female  Wants to know gender: yes  Other: A full anatomy survey previously performed.    Impression  =========    1. 32w 2d carmona intrauterine pregnancy  2. Interval fetal growth with EFW at 1689 gms at 15%:  -Femur length continues to lag by about 2 wks ( 2%): but interval growth visualized  -graphs of fetal biometry/long bones suggest proportional  measurements - could be constitutional  (I agree with Dr. Loredo, at this time .    Recommendation  ==============    1. [facility] will schedule f/u sono in 3 wks for interval fetal growth, f/u anatomy, MVP, and overall fetal well being .

## 2017-07-20 NOTE — LETTER
July 20, 2017      Arlet Constantino MD  [address] [facility] [address]           [facility]  [address]  [facility] [address]  Phone: [phone]          Patient: Crys Peña   MR Number: [id number]   YOB: 1980   Date of Visit: 7/20/2017       Dear Dr. Arlet Constantino:    Thank you for referring Crys Peña to me for evaluation. Attached you will find relevant portions of my assessment and plan of care.    If you have questions, please do not hesitate to call me. I look forward to following Crys Peña along with you.    Sincerely,    Carolee Norwood MD    Enclosure  CC:  No Recipients    If you would like to receive this communication electronically, please contact [email or web address] or [phone] to request more information on [employer] Link access.    For providers and/or their staff who would like to refer a patient to [facility], please contact us through our one-stop-shop provider referral line, [facility], at [phone].    If you feel you have received this communication in error or would no longer like to receive these types of communications, please e-mail [email or web address]

## 2017-07-21 NOTE — PROGRESS NOTES
Indication  ========    NST: .    History  ======    General History  Other: EFW 15%, FL 2%  Risk Factors  History risk factors: AMA    Maternal Assessment  =================    BP syst 114 mmHg  BP diast 66 mmHg    Pregnancy  =========    [place] pregnancy. Number of fetuses: 1.    Dating  ======    Cycle: regular cycle  Assigned: Dating performed on 04/6/2017, based on the LMP  Assigned GA 32 w + 2 d  Assigned DAYANA: 9/12/2017    General Evaluation  ==============    Cardiac activity: present.  bpm.  Fetal movements: visualized.  Presentation: cephalic.  Placenta: anterior.  Umbilical cord: 3 vessel cord.    Non Stress Test  =============    NST interpretation: non-reactive. Test duration 49 min. Baseline  bpm. Accelerations: present, 10 x10s, 15 x 15 x1. Decelerations:  absent. Uterine activity: absent. Acoustic stimulation: no  reports + fetal movement, denies ctx, vag bleeding or loss of fluid    Amniotic Fluid Assessment  =====================    Amount of AF: normal amount  MVP 4.5 cm    Biophysical Profile  ==============    2: Fetal breathing movements  2: Gross body movements  2: Fetal tone  2: Amniotic fluid volume  0: NST  8/10: Biophysical profile score    Impression  =========    BPP with NST = 8/10  Amniotic fluid volume wnl (MVP 4.5cm).    Recommendation  ==============    fetal surveillance per clinical condition .

## 2017-08-01 ENCOUNTER — ROUTINE PRENATAL (OUTPATIENT)
Dept: OBSTETRICS AND GYNECOLOGY | Facility: CLINIC | Age: 37
End: 2017-08-01
Payer: COMMERCIAL

## 2017-08-01 VITALS
BODY MASS INDEX: 34.66 KG/M2 | WEIGHT: 201.94 LBS | SYSTOLIC BLOOD PRESSURE: 110 MMHG | DIASTOLIC BLOOD PRESSURE: 80 MMHG

## 2017-08-01 DIAGNOSIS — O35.HXX0 SHORT FEMUR OF FETUS ON PRENATAL ULTRASOUND: ICD-10-CM

## 2017-08-01 DIAGNOSIS — O09.523 ELDERLY MULTIGRAVIDA, THIRD TRIMESTER: Primary | ICD-10-CM

## 2017-08-01 PROCEDURE — 99999 PR PBB SHADOW E&M-EST. PATIENT-LVL II: CPT | Mod: PBBFAC,,, | Performed by: OBSTETRICS & GYNECOLOGY

## 2017-08-01 PROCEDURE — 0502F SUBSEQUENT PRENATAL CARE: CPT | Mod: S$GLB,,, | Performed by: OBSTETRICS & GYNECOLOGY

## 2017-08-09 ENCOUNTER — OFFICE VISIT (OUTPATIENT)
Dept: MATERNAL FETAL MEDICINE | Facility: CLINIC | Age: 37
End: 2017-08-09
Payer: COMMERCIAL

## 2017-08-09 DIAGNOSIS — O09.529 AMA (ADVANCED MATERNAL AGE) MULTIGRAVIDA 35+, UNSPECIFIED TRIMESTER: ICD-10-CM

## 2017-08-09 DIAGNOSIS — O35.HXX0 SHORT FEMUR OF FETUS ON PRENATAL ULTRASOUND: ICD-10-CM

## 2017-08-09 DIAGNOSIS — Z36.89 ENCOUNTER FOR ULTRASOUND TO CHECK FETAL GROWTH: ICD-10-CM

## 2017-08-09 PROCEDURE — 99213 OFFICE O/P EST LOW 20 MIN: CPT | Mod: 25,S$GLB,, | Performed by: OBSTETRICS & GYNECOLOGY

## 2017-08-09 PROCEDURE — 76816 OB US FOLLOW-UP PER FETUS: CPT | Mod: S$GLB,,, | Performed by: OBSTETRICS & GYNECOLOGY

## 2017-08-09 PROCEDURE — 3008F BODY MASS INDEX DOCD: CPT | Mod: S$GLB,,, | Performed by: OBSTETRICS & GYNECOLOGY

## 2017-08-09 PROCEDURE — 76820 UMBILICAL ARTERY ECHO: CPT | Mod: S$GLB,,, | Performed by: OBSTETRICS & GYNECOLOGY

## 2017-08-09 NOTE — LETTER
August 11, 2017      Arlet Constantino MD  [address] [facility] [address]           [facility]  [address]  [place] [address]  Phone: [phone]          Patient: Crys Peña   MR Number: [id number]   YOB: 1980   Date of Visit: 8/9/2017       Dear Dr. Arlet Constantino:    Thank you for referring Crys Peña to me for evaluation. Attached you will find relevant portions of my assessment and plan of care.    If you have questions, please do not hesitate to call me. I look forward to following Crys Peña along with you.    Sincerely,    Chichi Bey MD    Enclosure  CC:  No Recipients    If you would like to receive this communication electronically, please contact [email or web address] or [phone] to request more information on [employer] Link access.    For providers and/or their staff who would like to refer a patient to [facility], please contact us through our one-stop-shop provider referral line, [facility], at [phone].    If you feel you have received this communication in error or would no longer like to receive these types of communications, please e-mail [email or web address]

## 2017-08-11 ENCOUNTER — PATIENT MESSAGE (OUTPATIENT)
Dept: MATERNAL FETAL MEDICINE | Facility: CLINIC | Age: 37
End: 2017-08-11

## 2017-08-11 ENCOUNTER — TELEPHONE (OUTPATIENT)
Dept: MATERNAL FETAL MEDICINE | Facility: CLINIC | Age: 37
End: 2017-08-11

## 2017-08-11 DIAGNOSIS — O36.5931 POOR FETAL GROWTH AFFECTING MANAGEMENT OF MOTHER IN THIRD TRIMESTER, FETUS 1: Primary | ICD-10-CM

## 2017-08-11 NOTE — PROGRESS NOTES
Indication  ========    Evaluation of fetal growth/ Long Bones.    History  ======    General History  Other: EFW 15%, FL 2%  Risk Factors  History risk factors: AMA    Method  ======    Transabdominal ultrasound examination. View: Sufficient.    Pregnancy  =========    [place] pregnancy. Number of fetuses: 1.    Dating  ======    Cycle: regular cycle  Ultrasound examination on: 8/9/2017  GA by U/S based upon: AC, BPD, Femur, HC  GA by U/S 32 w + 6 d  DAYANA by U/S: 9/28/2017  Assigned: Dating performed on 04/6/2017, based on the LMP  Assigned GA 35 w + 1 d  Assigned DAYANA: 9/12/2017    General Evaluation  ==============    Cardiac activity: present.  bpm.  Fetal movements: visualized.  Presentation: cephalic.  Placenta: anterior.  Amniotic fluid: MVP 5.4 cm.    Fetal Biometry  ============    Fetal Biometry  BPD 77.7 mm 31w 1d Hadlock  .6 mm 36w 6d Raciel  .2 mm 34w 2d Hadlock  .6 mm 33w 3d Hadlock  Femur 62.5 mm 32w 3d Hadlock  Cerebellum tr 43.8 mm 35w 0d Smith  CM 5.4 mm  Humerus 54.0 mm 31w 3d Raciel  EFW 2,098 g 7% Ford  Calculated by: Hadlock (BPD-HC-AC-FL)  EFW (lb) 4 lb  EFW (oz) 10 oz  Cephalic index 0.70  HC / AC 1.04  FL / BPD 0.80  FL / AC 0.21  MVP 5.4 cm   bpm  Head / Face / Neck   4.3 mm  Extremities / Bony Struc  Radius 45.1 mm  Ulna 52.5 mm 33w 1d Raciel  Rt Humerus 56.3 mm 32w 5d Raciel  Lt Humerus 51.6 mm 30w 1d Raciel  Lt Radius 45.1 mm  Lt Ulna 52.5 mm 33w 1d Raciel  Tibia 53.9 mm 31w 6d Raciel  Fibula 51.1 mm 31w 1d Raciel  Rt Femur 61.7 mm 32w 0d Hadlock  Rt Tibia 53.8 mm 31w 5d Raciel  Rt Fibula 50.2 mm 30w 4d Raciel  Lt Femur 63.3 mm 32w 5d Hadlock  Lt Tibia 53.9 mm 31w 6d Raciel  Lt Fibula 51.9 mm 31w 6d Raciel    Fetal Anatomy  ============    Lateral ventricles: normal  4-chamber view: normal  Stomach: normal  Kidneys: normal  Bladder: normal  Wants to know gender: yes  Other: A full anatomic survey has been previously performed.    Fetal  Doppler  ===========    Umbilical Cord  Umbilical A PS 51.30 cm/s  Umbilical A ED 16.61 cm/s  Umbilical A TAmax 30.86 cm/s  Umbilical A MD 16.34 cm/s  Umbilical A S / D 3.09 82% Lucy    Impression  =========    [place] live intrauterine pregnancy.  EFW is at the 11th percentile, mainly due to the lagging femur length. AC and HC measure normal (lower end of normal).  Amniotic fluid volume is normal.  UA dopplers are normal.  BPP 8/8.  The long bones continue to remain shortened, but have shown interval growth. The mineralization appears normal and no fractures are noted.  We were unable to obtain an accurate thoracic circumference today. The bone plots on the graph follow the same trajectory as they have  through pregnancy. I discussed with the patient that this may be normal variation, given her prior small children. We did discuss the potential  risk for chromosomal and genetic disorders. The patient has been counseled previously about the findings and declined aneuploidy screening  and amniocentesis. We discussed the potential of a skeletal dysplasia as well. The  will need a detailed exam by the pediatricians.  FL/AC ratio is favorable.  Recommend delivery at 39 weeks or earlier if indicated.  Some of the anatomy remains suboptimally visualized. The remainder that was seen was normal.    ADDENDUM: When the patient was in ultrasound, the EFW was listed as the 11th percentile but altered to the 7th with measurements. I will  ask our staff to contact the patient to schedule a weekly BPP and UA doppler given the alteration. We will do this weekly as the likely reason  for the lower EFW is long bone measurement.    A total of 15 minutes was spent in face to face time with greater than half of that time spent in counseling and coordination of care.  Recommendation  ==============    Recommend delivery at 39 weeks or earlier if indicated.  Notify the pediatricians at delivery of ultrasound findings.  No  further ultrasounds scheduled but will have our office contact her.

## 2017-08-11 NOTE — TELEPHONE ENCOUNTER
----- Message from Chichi Bey MD sent at 8/11/2017  7:44 AM CDT -----  Can you please contact this patient and schedule her for weekly BPP and UA dopplers?  I was told she was the 11th percentile but EFW is only 7th-she only needs weekly bc it is likely due to short femurs.  Chichi      Pt had received the message regarding prenatal testing appointments that Dr. Bey had sent her. Weekly BPP/Dopplers booked for the next 3 wks in PNT on 3rd floor. Also informed patient that she will need to schedule these weekly appointments until she delivers.    Pt verbalized understanding of information.

## 2017-08-17 ENCOUNTER — HOSPITAL ENCOUNTER (OUTPATIENT)
Dept: PERINATAL CARE | Facility: OTHER | Age: 37
Discharge: HOME | End: 2017-08-17
Attending: OBSTETRICS & GYNECOLOGY
Payer: COMMERCIAL

## 2017-08-17 ENCOUNTER — ROUTINE PRENATAL (OUTPATIENT)
Dept: OBSTETRICS AND GYNECOLOGY | Facility: CLINIC | Age: 37
End: 2017-08-17
Payer: COMMERCIAL

## 2017-08-17 VITALS
BODY MASS INDEX: 34.85 KG/M2 | DIASTOLIC BLOOD PRESSURE: 70 MMHG | WEIGHT: 203.06 LBS | SYSTOLIC BLOOD PRESSURE: 100 MMHG

## 2017-08-17 DIAGNOSIS — O36.5931 POOR FETAL GROWTH AFFECTING MANAGEMENT OF MOTHER IN THIRD TRIMESTER, FETUS 1: ICD-10-CM

## 2017-08-17 DIAGNOSIS — Z34.93 NORMAL PREGNANCY, THIRD TRIMESTER: ICD-10-CM

## 2017-08-17 DIAGNOSIS — O23.43: Primary | ICD-10-CM

## 2017-08-17 PROCEDURE — 99999 PR PBB SHADOW E&M-EST. PATIENT-LVL II: CPT | Mod: PBBFAC,,, | Performed by: OBSTETRICS & GYNECOLOGY

## 2017-08-17 PROCEDURE — 76818 FETAL BIOPHYS PROFILE W/NST: CPT

## 2017-08-17 PROCEDURE — 0502F SUBSEQUENT PRENATAL CARE: CPT | Mod: S$GLB,,, | Performed by: OBSTETRICS & GYNECOLOGY

## 2017-08-17 PROCEDURE — 76820 UMBILICAL ARTERY ECHO: CPT

## 2017-08-17 PROCEDURE — 59025 FETAL NON-STRESS TEST: CPT | Mod: 26,59,, | Performed by: OBSTETRICS & GYNECOLOGY

## 2017-08-17 PROCEDURE — 76815 OB US LIMITED FETUS(S): CPT | Mod: 26,59,, | Performed by: OBSTETRICS & GYNECOLOGY

## 2017-08-17 NOTE — PROGRESS NOTES
Good fetal movement.  No contractions, no vaginal bleeding, and no loss of fluid.  Patient's induction scheduled for 9/5 at 5 am.  Discussed FGR and possibility of needing earlier delivery if testing/dopplers indicate.  Questions answered.

## 2017-08-21 LAB — BACTERIA SPEC AEROBE CULT: NORMAL

## 2017-08-24 ENCOUNTER — ROUTINE PRENATAL (OUTPATIENT)
Dept: OBSTETRICS AND GYNECOLOGY | Facility: CLINIC | Age: 37
End: 2017-08-24
Payer: COMMERCIAL

## 2017-08-24 ENCOUNTER — HOSPITAL ENCOUNTER (OUTPATIENT)
Dept: PERINATAL CARE | Facility: OTHER | Age: 37
Discharge: HOME | End: 2017-08-24
Attending: OBSTETRICS & GYNECOLOGY
Payer: COMMERCIAL

## 2017-08-24 VITALS — BODY MASS INDEX: 35 KG/M2 | DIASTOLIC BLOOD PRESSURE: 70 MMHG | WEIGHT: 203.94 LBS | SYSTOLIC BLOOD PRESSURE: 90 MMHG

## 2017-08-24 DIAGNOSIS — O36.5931 POOR FETAL GROWTH AFFECTING MANAGEMENT OF MOTHER IN THIRD TRIMESTER, FETUS 1: ICD-10-CM

## 2017-08-24 DIAGNOSIS — R82.998 LEUKOCYTES IN URINE: ICD-10-CM

## 2017-08-24 DIAGNOSIS — O09.523 ELDERLY MULTIGRAVIDA, THIRD TRIMESTER: Primary | ICD-10-CM

## 2017-08-24 PROCEDURE — 76820 UMBILICAL ARTERY ECHO: CPT | Mod: 26,,, | Performed by: OBSTETRICS & GYNECOLOGY

## 2017-08-24 PROCEDURE — 76819 FETAL BIOPHYS PROFIL W/O NST: CPT | Mod: 26,,, | Performed by: OBSTETRICS & GYNECOLOGY

## 2017-08-24 PROCEDURE — 0502F SUBSEQUENT PRENATAL CARE: CPT | Mod: S$GLB,,, | Performed by: OBSTETRICS & GYNECOLOGY

## 2017-08-24 PROCEDURE — 99999 PR PBB SHADOW E&M-EST. PATIENT-LVL II: CPT | Mod: PBBFAC,,, | Performed by: OBSTETRICS & GYNECOLOGY

## 2017-08-24 PROCEDURE — 87186 SC STD MICRODIL/AGAR DIL: CPT

## 2017-08-24 PROCEDURE — 87086 URINE CULTURE/COLONY COUNT: CPT

## 2017-08-24 PROCEDURE — 87077 CULTURE AEROBIC IDENTIFY: CPT

## 2017-08-24 PROCEDURE — 87088 URINE BACTERIA CULTURE: CPT

## 2017-08-25 NOTE — PROGRESS NOTES
Indication  ========    BPP/ Umb doppler.    History  ======    General History  Other: Dr. Constantino  EFW 15%, FL 2%  Risk Factors  History risk factors: AMA  Details: SGA poor fetal growth    Maternal Assessment  =================    BP syst 118 mmHg  BP diast 81 mmHg    Method  ======    Transabdominal ultrasound examination. View: Sufficient.    Pregnancy  =========    [place] pregnancy. Number of fetuses: 1.    Dating  ======    Cycle: regular cycle  Assigned: Dating performed on 2017, based on the LMP  Assigned GA 37 w + 2 d  Assigned DAYANA: 2017    General Evaluation  ==============    Cardiac activity: present.  bpm.  Fetal movements: visualized.  Presentation: cephalic.  Placenta: anterior.  Umbilical cord: 3 vessel cord.    Amniotic Fluid Assessment  =====================    Amount of AF: normal amount  MVP 5.4 cm    Biophysical Profile  ==============    2: Fetal breathing movements  2: Gross body movements  2: Fetal tone  2: Amniotic fluid volume  8/8: Biophysical profile score  Interpretation: normal    Fetal Doppler  ===========    Umbilical Cord  Umbilical A PS 46.70 cm/s  Umbilical A ED 18.02 cm/s  Umbilical A TAmax 28.99 cm/s  Umbilical A MD 17.51 cm/s  Umbilical A S / D 2.52 62% Lucy  Umbilical A  bpm    Impression  =========    BPP 8/8.  Normal amniotic fluid volume.  Normal UA doppler.    Recommendation  ==============    Continue  fetal surveillance as clinically indicated.

## 2017-08-28 ENCOUNTER — PATIENT MESSAGE (OUTPATIENT)
Dept: OBSTETRICS AND GYNECOLOGY | Facility: CLINIC | Age: 37
End: 2017-08-28

## 2017-08-28 DIAGNOSIS — N30.00 ACUTE CYSTITIS WITHOUT HEMATURIA: Primary | ICD-10-CM

## 2017-08-28 LAB — BACTERIA UR CULT: NORMAL

## 2017-08-28 RX ORDER — NITROFURANTOIN 25; 75 MG/1; MG/1
100 CAPSULE ORAL 2 TIMES DAILY
Qty: 14 CAPSULE | Refills: 0 | Status: SHIPPED | OUTPATIENT
Start: 2017-08-28 | End: 2017-09-04

## 2017-08-31 ENCOUNTER — ANESTHESIA (OUTPATIENT)
Dept: OBSTETRICS AND GYNECOLOGY | Facility: OTHER | Age: 37
End: 2017-08-31
Payer: COMMERCIAL

## 2017-08-31 ENCOUNTER — HOSPITAL ENCOUNTER (INPATIENT)
Facility: OTHER | Age: 37
LOS: 2 days | Discharge: HOME | End: 2017-09-02
Attending: OBSTETRICS & GYNECOLOGY | Admitting: OBSTETRICS & GYNECOLOGY
Payer: COMMERCIAL

## 2017-08-31 ENCOUNTER — RESEARCH ENCOUNTER (OUTPATIENT)
Dept: RESEARCH | Facility: HOSPITAL | Age: 37
End: 2017-08-31

## 2017-08-31 ENCOUNTER — HOSPITAL ENCOUNTER (OUTPATIENT)
Dept: PERINATAL CARE | Facility: OTHER | Age: 37
Discharge: HOME | End: 2017-08-31
Attending: OBSTETRICS & GYNECOLOGY
Payer: COMMERCIAL

## 2017-08-31 ENCOUNTER — ANESTHESIA EVENT (OUTPATIENT)
Dept: OBSTETRICS AND GYNECOLOGY | Facility: OTHER | Age: 37
End: 2017-08-31
Payer: COMMERCIAL

## 2017-08-31 DIAGNOSIS — O36.5931 POOR FETAL GROWTH AFFECTING MANAGEMENT OF MOTHER IN THIRD TRIMESTER, FETUS 1: ICD-10-CM

## 2017-08-31 DIAGNOSIS — Z30.09 UNWANTED FERTILITY: ICD-10-CM

## 2017-08-31 DIAGNOSIS — O41.00X0 OLIGOHYDRAMNIOS: ICD-10-CM

## 2017-08-31 PROBLEM — O36.5990 PREGNANCY AFFECTED BY FETAL GROWTH RESTRICTION: Status: ACTIVE | Noted: 2017-07-17

## 2017-08-31 LAB
ABO + RH BLD: NORMAL
ALLENS TEST: ABNORMAL
ANISOCYTOSIS BLD QL SMEAR: SLIGHT
ANISOCYTOSIS BLD QL SMEAR: SLIGHT
BASOPHILS # BLD AUTO: 0.01 K/UL
BASOPHILS # BLD AUTO: 0.01 K/UL
BASOPHILS NFR BLD: 0.2 %
BASOPHILS NFR BLD: 0.2 %
BLD GP AB SCN CELLS X3 SERPL QL: NORMAL
DIFFERENTIAL METHOD: ABNORMAL
DIFFERENTIAL METHOD: ABNORMAL
EOSINOPHIL # BLD AUTO: 0 K/UL
EOSINOPHIL # BLD AUTO: 0 K/UL
EOSINOPHIL NFR BLD: 0.6 %
EOSINOPHIL NFR BLD: 0.6 %
ERYTHROCYTE [DISTWIDTH] IN BLOOD BY AUTOMATED COUNT: 14.6 %
ERYTHROCYTE [DISTWIDTH] IN BLOOD BY AUTOMATED COUNT: 14.6 %
HCO3 UR-SCNC: 24.2 MMOL/L (ref 24–28)
HCT VFR BLD AUTO: 28.8 %
HCT VFR BLD AUTO: 28.8 %
HGB BLD-MCNC: 9.3 G/DL
HGB BLD-MCNC: 9.3 G/DL
LYMPHOCYTES # BLD AUTO: 1.3 K/UL
LYMPHOCYTES # BLD AUTO: 1.3 K/UL
LYMPHOCYTES NFR BLD: 27.6 %
LYMPHOCYTES NFR BLD: 27.6 %
MCH RBC QN AUTO: 26.7 PG
MCH RBC QN AUTO: 26.7 PG
MCHC RBC AUTO-ENTMCNC: 32.3 G/DL
MCHC RBC AUTO-ENTMCNC: 32.3 G/DL
MCV RBC AUTO: 83 FL
MCV RBC AUTO: 83 FL
MONOCYTES # BLD AUTO: 0.1 K/UL
MONOCYTES # BLD AUTO: 0.1 K/UL
MONOCYTES NFR BLD: 2.5 %
MONOCYTES NFR BLD: 2.5 %
NEUTROPHILS # BLD AUTO: 3.3 K/UL
NEUTROPHILS # BLD AUTO: 3.3 K/UL
NEUTROPHILS NFR BLD: 69.1 %
NEUTROPHILS NFR BLD: 69.1 %
PCO2 BLDA: 52.4 MMHG (ref 35–45)
PH SMN: 7.27 [PH] (ref 7.35–7.45)
PLATELET # BLD AUTO: ABNORMAL K/UL
PLATELET # BLD AUTO: ABNORMAL K/UL
PLATELET BLD QL SMEAR: ABNORMAL
PLATELET BLD QL SMEAR: ABNORMAL
PMV BLD AUTO: ABNORMAL FL
PMV BLD AUTO: ABNORMAL FL
PO2 BLDA: 16 MMHG (ref 80–100)
POC BE: -3 MMOL/L
POC SATURATED O2: 16 % (ref 95–100)
RBC # BLD AUTO: 3.48 M/UL
RBC # BLD AUTO: 3.48 M/UL
RPR SER QL: NORMAL
SAMPLE: ABNORMAL
SITE: ABNORMAL
WBC # BLD AUTO: 4.85 K/UL
WBC # BLD AUTO: 4.85 K/UL

## 2017-08-31 PROCEDURE — 72100002 HC LABOR CARE, 1ST 8 HOURS

## 2017-08-31 PROCEDURE — 86703 HIV-1/HIV-2 1 RESULT ANTBDY: CPT

## 2017-08-31 PROCEDURE — 25000003 PHARM REV CODE 250: Performed by: STUDENT IN AN ORGANIZED HEALTH CARE EDUCATION/TRAINING PROGRAM

## 2017-08-31 PROCEDURE — 59409 OBSTETRICAL CARE: CPT | Mod: QK,,, | Performed by: ANESTHESIOLOGY

## 2017-08-31 PROCEDURE — 76819 FETAL BIOPHYS PROFIL W/O NST: CPT | Mod: 26,,, | Performed by: OBSTETRICS & GYNECOLOGY

## 2017-08-31 PROCEDURE — 86900 BLOOD TYPING SEROLOGIC ABO: CPT

## 2017-08-31 PROCEDURE — 62326 NJX INTERLAMINAR LMBR/SAC: CPT | Performed by: ANESTHESIOLOGY

## 2017-08-31 PROCEDURE — 86592 SYPHILIS TEST NON-TREP QUAL: CPT

## 2017-08-31 PROCEDURE — 76819 FETAL BIOPHYS PROFIL W/O NST: CPT

## 2017-08-31 PROCEDURE — 85025 COMPLETE CBC W/AUTO DIFF WBC: CPT

## 2017-08-31 PROCEDURE — 11000001 HC ACUTE MED/SURG PRIVATE ROOM

## 2017-08-31 PROCEDURE — 72200005 HC VAGINAL DELIVERY LEVEL II

## 2017-08-31 PROCEDURE — 36415 COLL VENOUS BLD VENIPUNCTURE: CPT

## 2017-08-31 PROCEDURE — 51702 INSERT TEMP BLADDER CATH: CPT

## 2017-08-31 PROCEDURE — 27800517 HC TRAY,EPIDURAL-CONTINUOUS: Performed by: STUDENT IN AN ORGANIZED HEALTH CARE EDUCATION/TRAINING PROGRAM

## 2017-08-31 PROCEDURE — 27200710 HC EPIDURAL INFUSION PUMP SET: Performed by: STUDENT IN AN ORGANIZED HEALTH CARE EDUCATION/TRAINING PROGRAM

## 2017-08-31 PROCEDURE — 59400 OBSTETRICAL CARE: CPT | Mod: ,,, | Performed by: OBSTETRICS & GYNECOLOGY

## 2017-08-31 PROCEDURE — 86901 BLOOD TYPING SEROLOGIC RH(D): CPT

## 2017-08-31 RX ORDER — OXYTOCIN/RINGER'S LACTATE 20/1000 ML
2 PLASTIC BAG, INJECTION (ML) INTRAVENOUS CONTINUOUS
Status: DISCONTINUED | OUTPATIENT
Start: 2017-08-31 | End: 2017-08-31

## 2017-08-31 RX ORDER — KETOROLAC TROMETHAMINE 30 MG/ML
30 INJECTION, SOLUTION INTRAMUSCULAR; INTRAVENOUS EVERY 6 HOURS
Status: DISCONTINUED | OUTPATIENT
Start: 2017-08-31 | End: 2017-08-31

## 2017-08-31 RX ORDER — HYDROCORTISONE 25 MG/G
CREAM TOPICAL 3 TIMES DAILY PRN
Status: DISCONTINUED | OUTPATIENT
Start: 2017-08-31 | End: 2017-09-02 | Stop reason: HOSPADM

## 2017-08-31 RX ORDER — CARBOPROST TROMETHAMINE 250 UG/ML
INJECTION, SOLUTION INTRAMUSCULAR
Status: DISCONTINUED
Start: 2017-08-31 | End: 2017-08-31 | Stop reason: WASHOUT

## 2017-08-31 RX ORDER — BUPIVACAINE HYDROCHLORIDE 2.5 MG/ML
INJECTION, SOLUTION EPIDURAL; INFILTRATION; INTRACAUDAL
Status: DISPENSED
Start: 2017-08-31 | End: 2017-09-01

## 2017-08-31 RX ORDER — IBUPROFEN 600 MG/1
600 TABLET ORAL EVERY 6 HOURS
Status: DISCONTINUED | OUTPATIENT
Start: 2017-09-01 | End: 2017-09-02 | Stop reason: HOSPADM

## 2017-08-31 RX ORDER — SODIUM CITRATE AND CITRIC ACID MONOHYDRATE 334; 500 MG/5ML; MG/5ML
30 SOLUTION ORAL ONCE
Status: DISCONTINUED | OUTPATIENT
Start: 2017-08-31 | End: 2017-08-31

## 2017-08-31 RX ORDER — METHYLERGONOVINE MALEATE 0.2 MG/ML
INJECTION INTRAVENOUS
Status: DISCONTINUED
Start: 2017-08-31 | End: 2017-08-31 | Stop reason: WASHOUT

## 2017-08-31 RX ORDER — DIPHENHYDRAMINE HYDROCHLORIDE 50 MG/ML
25 INJECTION INTRAMUSCULAR; INTRAVENOUS EVERY 4 HOURS PRN
Status: DISCONTINUED | OUTPATIENT
Start: 2017-08-31 | End: 2017-09-02 | Stop reason: HOSPADM

## 2017-08-31 RX ORDER — ACETAMINOPHEN 325 MG/1
650 TABLET ORAL EVERY 6 HOURS PRN
Status: DISCONTINUED | OUTPATIENT
Start: 2017-08-31 | End: 2017-09-02 | Stop reason: HOSPADM

## 2017-08-31 RX ORDER — FAMOTIDINE 10 MG/ML
20 INJECTION INTRAVENOUS ONCE
Status: DISCONTINUED | OUTPATIENT
Start: 2017-08-31 | End: 2017-08-31

## 2017-08-31 RX ORDER — IBUPROFEN 400 MG/1
800 TABLET ORAL EVERY 8 HOURS
Status: DISCONTINUED | OUTPATIENT
Start: 2017-09-01 | End: 2017-08-31

## 2017-08-31 RX ORDER — FENTANYL/BUPIVACAINE/NS/PF 2MCG/ML-.1
PLASTIC BAG, INJECTION (ML) INJECTION CONTINUOUS PRN
Status: DISCONTINUED | OUTPATIENT
Start: 2017-08-31 | End: 2017-08-31

## 2017-08-31 RX ORDER — NITROFURANTOIN 25; 75 MG/1; MG/1
100 CAPSULE ORAL 2 TIMES DAILY
Status: DISCONTINUED | OUTPATIENT
Start: 2017-08-31 | End: 2017-09-02 | Stop reason: HOSPADM

## 2017-08-31 RX ORDER — OXYTOCIN/RINGER'S LACTATE 20/1000 ML
41.65 PLASTIC BAG, INJECTION (ML) INTRAVENOUS CONTINUOUS
Status: ACTIVE | OUTPATIENT
Start: 2017-08-31 | End: 2017-09-01

## 2017-08-31 RX ORDER — MISOPROSTOL 200 UG/1
600 TABLET ORAL
Status: DISCONTINUED | OUTPATIENT
Start: 2017-08-31 | End: 2017-08-31

## 2017-08-31 RX ORDER — HYDROCODONE BITARTRATE AND ACETAMINOPHEN 5; 325 MG/1; MG/1
1 TABLET ORAL EVERY 4 HOURS PRN
Status: DISCONTINUED | OUTPATIENT
Start: 2017-08-31 | End: 2017-09-02 | Stop reason: HOSPADM

## 2017-08-31 RX ORDER — FENTANYL CITRATE 50 UG/ML
INJECTION, SOLUTION INTRAMUSCULAR; INTRAVENOUS
Status: DISPENSED
Start: 2017-08-31 | End: 2017-09-01

## 2017-08-31 RX ORDER — DOCUSATE SODIUM 100 MG/1
200 CAPSULE, LIQUID FILLED ORAL 2 TIMES DAILY PRN
Status: DISCONTINUED | OUTPATIENT
Start: 2017-08-31 | End: 2017-09-02 | Stop reason: HOSPADM

## 2017-08-31 RX ORDER — ONDANSETRON 8 MG/1
8 TABLET, ORALLY DISINTEGRATING ORAL EVERY 8 HOURS PRN
Status: DISCONTINUED | OUTPATIENT
Start: 2017-08-31 | End: 2017-09-02 | Stop reason: HOSPADM

## 2017-08-31 RX ORDER — ONDANSETRON 8 MG/1
8 TABLET, ORALLY DISINTEGRATING ORAL EVERY 8 HOURS PRN
Status: DISCONTINUED | OUTPATIENT
Start: 2017-08-31 | End: 2017-08-31

## 2017-08-31 RX ORDER — LIDOCAINE HYDROCHLORIDE AND EPINEPHRINE 15; 5 MG/ML; UG/ML
INJECTION, SOLUTION EPIDURAL
Status: DISCONTINUED | OUTPATIENT
Start: 2017-08-31 | End: 2017-08-31

## 2017-08-31 RX ORDER — FENTANYL/BUPIVACAINE/NS/PF 2MCG/ML-.1
PLASTIC BAG, INJECTION (ML) INJECTION
Status: DISPENSED
Start: 2017-08-31 | End: 2017-09-01

## 2017-08-31 RX ORDER — DIPHENHYDRAMINE HCL 25 MG
25 CAPSULE ORAL EVERY 4 HOURS PRN
Status: DISCONTINUED | OUTPATIENT
Start: 2017-08-31 | End: 2017-09-02 | Stop reason: HOSPADM

## 2017-08-31 RX ORDER — FENTANYL/BUPIVACAINE/NS/PF 2MCG/ML-.1
PLASTIC BAG, INJECTION (ML) INJECTION CONTINUOUS
Status: DISCONTINUED | OUTPATIENT
Start: 2017-08-31 | End: 2017-08-31

## 2017-08-31 RX ORDER — HYDROCODONE BITARTRATE AND ACETAMINOPHEN 10; 325 MG/1; MG/1
1 TABLET ORAL EVERY 4 HOURS PRN
Status: DISCONTINUED | OUTPATIENT
Start: 2017-08-31 | End: 2017-09-02 | Stop reason: HOSPADM

## 2017-08-31 RX ADMIN — Medication 41.65 MILLI-UNITS/MIN: at 10:08

## 2017-08-31 RX ADMIN — Medication 333 MILLI-UNITS/MIN: at 09:08

## 2017-08-31 RX ADMIN — IBUPROFEN 600 MG: 600 TABLET, FILM COATED ORAL at 11:08

## 2017-08-31 RX ADMIN — LIDOCAINE HYDROCHLORIDE,EPINEPHRINE BITARTRATE 3 ML: 15; .005 INJECTION, SOLUTION EPIDURAL; INFILTRATION; INTRACAUDAL; PERINEURAL at 05:08

## 2017-08-31 RX ADMIN — Medication 10 ML/HR: at 05:08

## 2017-08-31 RX ADMIN — Medication 2 MILLI-UNITS/MIN: at 12:08

## 2017-08-31 RX ADMIN — Medication 5 ML: at 05:08

## 2017-08-31 RX ADMIN — NITROFURANTOIN MONOHYDRATE AND NITROFURANTOIN MACROCRYSTALLINE 100 MG: 75; 25 CAPSULE ORAL at 11:08

## 2017-08-31 NOTE — ASSESSMENT & PLAN NOTE
- Consents signed and to chart  - Admit to Labor and Delivery unit  - Plan for AROM and pitocin for labor management    - Epidural per Anesthesia  - Draw CBC, T&S  - Notify Staff  - Ultrasound performed, fetus in vertex position.  - Recheck in 2 hrs or PRN  - Post-Partum Hemorrhage risk - low

## 2017-08-31 NOTE — PROGRESS NOTES
Indication  ========    BPP/Dopplers: Size less than dates.    History  ======    General History  Other: Dr. Constantino  EFW 15%, FL 2%  Risk Factors  History risk factors: AMA  Details: SGA poor fetal growth    Maternal Assessment  =================    BP syst 113 mmHg  BP diast 73 mmHg    Method  ======    Transabdominal ultrasound examination.    Pregnancy  =========    [place] pregnancy. Number of fetuses: 1.    Dating  ======    Cycle: regular cycle  Assigned: Dating performed on 04/6/2017, based on the LMP  Assigned GA 38 w + 2 d  Assigned DAYANA: 9/12/2017    General Evaluation  ==============    Cardiac activity: present.  bpm.  Fetal movements: visualized.  Presentation: cephalic.  Placenta: anterior.  Umbilical cord: 3 vessel cord.    Amniotic Fluid Assessment  =====================    Amount of AF: normal amount, subjectively low  MVP 1.8 cm. MARILYN 5.2 cm. Q1 1.8 cm, Q2 1.8 cm, Q3 1.6 cm, Q4 0.0 cm    Biophysical Profile  ==============    2: Fetal breathing movements  2: Gross body movements  2: Fetal tone  0: Amniotic fluid volume  6/8: Biophysical profile score    Fetal Doppler  ===========    Umbilical Cord  Umbilical A PS 43.72 cm/s  Umbilical A ED -15.71 cm/s  Umbilical A TAmax 27.32 cm/s  Umbilical A MD 15.35 cm/s  Umbilical A S / D 2.69 74% Lucy    Impression  =========    1. 38w 2d carmona intrauterine pregnancy  2. BPP 6/8 (2 off for fluid)  3. Oligohydramnios with MVP 1.8cm.    Recommendation  ==============    Recommend delivery:  Libia Hays (PNT nurse) spoke to Dr. Constantino about recommendations: Pt to L&D for induction of labor.

## 2017-08-31 NOTE — HOSPITAL COURSE
2017 To L&D for IOL 2/2 to oligohydramnios and FUGR. Labor course managed with pitocin and AROM. , uncomplicated. See delivery note for details.   2017 Unable to tolerate dinner secondary to N/V. Pain well controlled, ambulating and voiding without difficulty.   2017 - PPD #2, patient doing well and ready for discharge. POD #1 s/p BTL

## 2017-08-31 NOTE — SUBJECTIVE & OBJECTIVE
Obstetric HPI:    Obstetric History       T4      L4     SAB0   TAB0   Ectopic0   Multiple0   Live Births4       # Outcome Date GA Lbr Hernan/2nd Weight Sex Delivery Anes PTL Lv   6 Current            5 2016           4 Term 12 40w0d  3.175 kg (7 lb) F Vag-Spont EPI N RUPERTO      Name: Lucie   3 Term 04 40w0d  3.175 kg (7 lb) M Vag-Spont EPI N RUPERTO      Name: Fredy   2 Term 02 37w0d  2.722 kg (6 lb) F Vag-Spont EPI N RUPERTO      Name: America   1 Term 08/10/01 42w0d  3.175 kg (7 lb) F Vag-Spont EPI N RUPERTO      Name: Clinton        Past Medical History:   Diagnosis Date    Pregnancy      Past Surgical History:   Procedure Laterality Date    DILATION AND CURETTAGE OF UTERUS      miscarriage       PTA Medications   Medication Sig    nitrofurantoin, macrocrystal-monohydrate, (MACROBID) 100 MG capsule Take 1 capsule (100 mg total) by mouth 2 (two) times daily.    prenatal #108-iron,carbonyl-FA 30-1 mg Tab Take by mouth once daily.       Review of patient's allergies indicates:  No Known Allergies     Family History     None        Social History Main Topics    Smoking status: Never Smoker    Smokeless tobacco: Never Used    Alcohol use No    Drug use: No    Sexual activity: Yes     Partners: Male     Birth control/ protection: None     Review of Systems   Constitutional: Negative for fever.   Respiratory: Negative for cough and shortness of breath.    Cardiovascular: Negative for leg swelling.   Gastrointestinal: Negative for abdominal pain (contractions) and vomiting.   Genitourinary: Negative for vaginal bleeding and vaginal discharge.   Neurological: Negative for headaches.   Hematological: Does not bruise/bleed easily.      Objective:     Vital Signs (Most Recent):  Temp: 96.4 °F (35.8 °C) (17 1134)  Pulse: 96 (17 1330)  Resp: 16 (17 1134)  BP: 107/73 (17 1315)  SpO2: 100 % (17 1330) Vital Signs (24h Range):  Temp:  [96.4 °F (35.8 °C)] 96.4 °F  (35.8 °C)  Pulse:  [] 96  Resp:  [16] 16  SpO2:  [97 %-100 %] 100 %  BP: (102-119)/(73-77) 107/73        There is no height or weight on file to calculate BMI.    FHT: 140sCat 1 (reassuring) min-mod btb variability     Physical Exam:   Constitutional: She is oriented to person, place, and time. She appears well-developed and well-nourished. No distress.    HENT:   Head: Normocephalic and atraumatic.      Cardiovascular: Normal rate and regular rhythm.     Pulmonary/Chest: Effort normal.        Abdominal: Soft. She exhibits no distension. There is no tenderness.   Gravid               Musculoskeletal: Normal range of motion. She exhibits no edema.       Neurological: She is alert and oriented to person, place, and time.    Skin: Skin is warm and dry.    Psychiatric: She has a normal mood and affect.       Cervix:  Dilation:  3  Effacement:  75%  Station: -2  Presentation: Vertex     Significant Labs:  Lab Results   Component Value Date    GROUPTRH B POS 02/11/2017    HEPBSAG Negative 02/11/2017    STREPBCULT No Group B Streptococcus isolated 08/17/2017       CBC:     Recent Labs  Lab 08/31/17  1218   WBC 4.85  4.85   RBC 3.48*  3.48*   HGB 9.3*  9.3*   HCT 28.8*  28.8*   MCV 83  83   MCH 26.7*  26.7*   MCHC 32.3  32.3

## 2017-08-31 NOTE — ANESTHESIA PROCEDURE NOTES
Epidural    Patient location during procedure: OB   Reason for block: primary anesthetic   Diagnosis: IUP   Start time: 8/31/2017 5:11 PM  Timeout: 8/31/2017 5:10 PM  End time: 8/31/2017 5:20 PM  Surgery related to: Vaginal Delivery  Staffing  Anesthesiologist: FE MORA  Resident/CRNA: MABEL YU  Performed: resident/CRNA   Preanesthetic Checklist  Completed: patient identified, site marked, surgical consent, pre-op evaluation, timeout performed, IV checked, risks and benefits discussed, monitors and equipment checked, anesthesia consent given, hand hygiene performed and patient being monitored  Preparation  Patient position: sitting  Prep: ChloraPrep  Patient monitoring: Pulse Ox  Epidural  Skin Anesthetic: lidocaine 1%  Skin Wheal: 3 mL  Administration type: continuous  Approach: midline  Interspace: L3-4  Injection technique: CARINE saline  Needle and Epidural Catheter  Needle type: Tuohy   Needle gauge: 17  Needle length: 3.5 inches  Needle insertion depth: 8 cm  Catheter type: springwEcoDirect  Catheter size: 19 G  Catheter at skin depth: 12 cm  Test dose: 3 mL of lidocaine 1.5% with Epi 1-to-200,000  Additional Documentation: incremental injection, negative aspiration for heme and CSF, no paresthesia on injection, no signs/symptoms of IV or SA injection, no significant pain on injection and no significant complaints from patient  Needle localization: anatomical landmarks  Medications:  Bolus administered: 10 mL of 0.125% bupivacaine  Opioid administered: 100 mcg of   fentanyl  Volume per aspiration: 5 mL  Time between aspirations: 5 minutes  Assessment  Ease of block: easy  Patient's tolerance of the procedure: comfortable throughout block and no complaints

## 2017-08-31 NOTE — HPI
Crys Peña is a 37 y.o. T4T1160H at 38w5d presents from [facility] for IOL 2/2 Oligohydramnios and FGR (EFW 7%).     This IUP is complicated by oligohydraminos, AMA, FGR (EFW <7% 2098g on ), and undesired fertility.  Patient denies contractions, denies vaginal bleeding, denies LOF.   Fetal Movement: normal.

## 2017-08-31 NOTE — PROGRESS NOTES
..PREVENA-C 2016.167.B    I met with Crys Peña in L&D to discuss the Prevena-C trial. She declined participation in the Prevena-C trial.

## 2017-08-31 NOTE — H&P
[facility]  Obstetrics  History & Physical    Patient Name: Crys Peña  MRN: [id number]  Admission Date: 2017  Primary Care Provider: Primary Doctor No    Subjective:     Principal Problem:Indication for care in labor or delivery    History of Present Illness:  Crys Peña is a 37 y.o. [id number] at 38w5d presents from [facility] for IOL 2/2 Oligohydramnios and FGR (EFW 7%).     This IUP is complicated by oligohydraminos, AMA, FGR (EFW <7% 2098g on ), and undesired fertility.  Patient denies contractions, denies vaginal bleeding, denies LOF.   Fetal Movement: normal.       Obstetric HPI:    Obstetric History       T4      L4     SAB0   TAB0   Ectopic0   Multiple0   Live Births4       # Outcome Date GA Lbr Hernan/2nd Weight Sex Delivery Anes PTL Lv   6 Current            5 2016           4 Term 12 40w0d  3.175 kg (7 lb) F Vag-Spont EPI N RUPERTO      Name: Lucie   3 Term 04 40w0d  3.175 kg (7 lb) M Vag-Spont EPI N RUPERTO      Name: Fredy   2 Term 02 37w0d  2.722 kg (6 lb) F Vag-Spont EPI N RUPERTO      Name: America   1 Term 08/10/01 42w0d  3.175 kg (7 lb) F Vag-Spont EPI N RUPERTO      Name: Clinton        Past Medical History:   Diagnosis Date    Pregnancy      Past Surgical History:   Procedure Laterality Date    DILATION AND CURETTAGE OF UTERUS      miscarriage       PTA Medications   Medication Sig    nitrofurantoin, macrocrystal-monohydrate, (MACROBID) 100 MG capsule Take 1 capsule (100 mg total) by mouth 2 (two) times daily.    prenatal #108-iron,carbonyl-FA 30-1 mg Tab Take by mouth once daily.       Review of patient's allergies indicates:  No Known Allergies     Family History     None        Social History Main Topics    Smoking status: Never Smoker    Smokeless tobacco: Never Used    Alcohol use No    Drug use: No    Sexual activity: Yes     Partners: Male     Birth control/ protection: None     Review of Systems   Constitutional: Negative for  fever.   Respiratory: Negative for cough and shortness of breath.    Cardiovascular: Negative for leg swelling.   Gastrointestinal: Negative for abdominal pain (contractions) and vomiting.   Genitourinary: Negative for vaginal bleeding and vaginal discharge.   Neurological: Negative for headaches.   Hematological: Does not bruise/bleed easily.      Objective:     Vital Signs (Most Recent):  Temp: 96.4 °F (35.8 °C) (17 1134)  Pulse: 96 (17 1330)  Resp: 16 (17 1134)  BP: 107/73 (17 1315)  SpO2: 100 % (17 1330) Vital Signs (24h Range):  Temp:  [96.4 °F (35.8 °C)] 96.4 °F (35.8 °C)  Pulse:  [] 96  Resp:  [16] 16  SpO2:  [97 %-100 %] 100 %  BP: (102-119)/(73-77) 107/73        There is no height or weight on file to calculate BMI.    FHT: 140sCat 1 (reassuring) min-mod btb variability     Physical Exam:   Constitutional: She is oriented to person, place, and time. She appears well-developed and well-nourished. No distress.    HENT:   Head: Normocephalic and atraumatic.      Cardiovascular: Normal rate and regular rhythm.     Pulmonary/Chest: Effort normal.        Abdominal: Soft. She exhibits no distension. There is no tenderness.   Gravid               Musculoskeletal: Normal range of motion. She exhibits no edema.       Neurological: She is alert and oriented to person, place, and time.    Skin: Skin is warm and dry.    Psychiatric: She has a normal mood and affect.       Cervix:  Dilation:  3  Effacement:  75%  Station: -2  Presentation: Vertex     Significant Labs:  Lab Results   Component Value Date    GROUPTRH B POS 2017    HEPBSAG Negative 2017    STREPBCULT No Group B Streptococcus isolated 2017       CBC:     Recent Labs  Lab 17  1218   WBC 4.85  4.85   RBC 3.48*  3.48*   HGB 9.3*  9.3*   HCT 28.8*  28.8*   MCV 83  83   MCH 26.7*  26.7*   MCHC 32.3  32.3     Assessment/Plan:     37 y.o. female  at 38w5d for:    Unwanted fertility    - BTL  consents signed  - plan for PP tubal with Dr. Constantino        Oligohydramnios    - IOL          Short femur of fetus on prenatal ultrasound    - Fetal growth restriction  - Notify NICU/peds at time of delivery, short long bones        * Indication for care in labor or delivery    - Consents signed and to chart  - Admit to Labor and Delivery unit  - Plan for AROM and pitocin for labor management    - Epidural per Anesthesia  - Draw CBC, T&S  - Notify Staff  - Ultrasound performed, fetus in vertex position.  - Recheck in 2 hrs or PRN  - Post-Partum Hemorrhage risk - low                  Randee Kellogg MD  Obstetrics  [facility]

## 2017-08-31 NOTE — ANESTHESIA PREPROCEDURE EVALUATION
2017  Pre-operative evaluation for * No surgery found *    Crys Peña is a 37 y.o. female  @ 38/5 coming in for IOL 2/2 oligo. 4 previous vaginal deliveries with epidurals.     OB History    Para Term  AB Living   6 4 4   1 4   SAB TAB Ectopic Multiple Live Births   1       4      # Outcome Date GA Lbr Hernan/2nd Weight Sex Delivery Anes PTL Lv   6 Current            5 SAB            4 Term 12 40w0d  3.175 kg (7 lb) F Vag-Spont EPI N RUPERTO   3 Term 04 40w0d  3.175 kg (7 lb) M Vag-Spont EPI N RUPERTO   2 Term 02 37w0d  2.722 kg (6 lb) F Vag-Spont EPI N RUPERTO   1 Term 08/10/01 42w0d  3.175 kg (7 lb) F Vag-Spont EPI N RUPERTO          Patient Active Problem List   Diagnosis    Elderly multigravida    Short femur of fetus on prenatal ultrasound    Recurrent urinary tract infection complicating pregnancy in third trimester    Oligohydramnios       Review of patient's allergies indicates:  No Known Allergies     No current facility-administered medications on file prior to encounter.      Current Outpatient Prescriptions on File Prior to Encounter   Medication Sig Dispense Refill    nitrofurantoin, macrocrystal-monohydrate, (MACROBID) 100 MG capsule Take 1 capsule (100 mg total) by mouth 2 (two) times daily. 14 capsule 0    prenatal #108-iron,carbonyl-FA 30-1 mg Tab Take by mouth once daily.         Past Surgical History:   Procedure Laterality Date    DILATION AND CURETTAGE OF UTERUS      miscarriage       Social History     Social History    Marital status: Single     Spouse name: N/A    Number of children: N/A    Years of education: N/A     Occupational History    Not on file.     Social History Main Topics    Smoking status: Never Smoker    Smokeless tobacco: Never Used    Alcohol use No    Drug use: No    Sexual activity: Yes     Partners: Male     Birth  control/ protection: None     Other Topics Concern    Not on file     Social History Narrative    No narrative on file         Vital Signs Range (Last 24H):  Temp:  [35.8 °C (96.4 °F)]   Pulse:  [96-97]   Resp:  [16]   BP: (116)/(77)   SpO2:  [97 %-99 %]       CBC: No results for input(s): WBC, RBC, HGB, HCT, PLT, MCV, MCH, MCHC in the last 72 hours.    CMP: No results for input(s): NA, K, CL, CO2, BUN, CREATININE, GLU, MG, PHOS, CALCIUM, ALBUMIN, PROT, ALKPHOS, ALT, AST, BILITOT in the last 72 hours.    INR  No results for input(s): INR, PROTIME, APTT in the last 72 hours.    Invalid input(s): PT    Anesthesia Evaluation    I have reviewed the Patient Summary Reports.    I have reviewed the Nursing Notes.   I have reviewed the Medications.     Review of Systems  Anesthesia Hx:  No problems with previous Anesthesia  History of prior surgery of interest to airway management or planning: Denies Family Hx of Anesthesia complications.   Denies Personal Hx of Anesthesia complications.   Hematology/Oncology:  Hematology Normal   Oncology Normal     EENT/Dental:EENT/Dental Normal   Cardiovascular:  Cardiovascular Normal     Pulmonary:  Pulmonary Normal    Renal/:  Renal/ Normal     Hepatic/GI:  Hepatic/GI Normal    Neurological:  Neurology Normal    Endocrine:  Endocrine Normal        Physical Exam  General:  Well nourished    Airway/Jaw/Neck:  Airway Findings: Mouth Opening: Normal Tongue: Large  General Airway Assessment: Adult  Mallampati: III  Improves to II with phonation.  TM Distance: Normal, at least 6 cm         Dental:  DENTAL FINDINGS: Normal   Chest/Lungs:  Chest/Lungs Clear    Heart/Vascular:  Heart Findings: Normal       Mental Status:  Mental Status Findings:  Cooperative, Alert and Oriented         Anesthesia Plan  Type of Anesthesia, risks & benefits discussed:  Anesthesia Type:  CSE, epidural, general, spinal  Patient's Preference: labor epidural  Intra-op Monitoring Plan: standard ASA  monitors  Intra-op Monitoring Plan Comments:   Post Op Pain Control Plan:   Post Op Pain Control Plan Comments:   Induction:   IV  Beta Blocker:  Patient is not currently on a Beta-Blocker (No further documentation required).       Informed Consent: Patient understands risks and agrees with Anesthesia plan.  Questions answered. Anesthesia consent signed with patient.  ASA Score: 2     Day of Surgery Review of History & Physical:    H&P update referred to the surgeon.     Anesthesia Plan Notes: Discussed anesthetic risks associated with labor epidural including infection, bleeding, dural puncture, and neurologic complications        Ready For Surgery From Anesthesia Perspective.

## 2017-08-31 NOTE — PROGRESS NOTES
"LABOR NOTE    S:  Complaints: Painful contractions. Ready for epidural.    O: /75   Pulse 82   Temp 96.4 °F (35.8 °C) (Temporal)   Resp 18   Ht 5' 4" (1.626 m)   Wt 92.1 kg (203 lb)   LMP 2016   SpO2 100%   Breastfeeding? No   BMI 34.84 kg/m²       FHT: 130bpm Cat 1 (reassuring), mod beat to beat variability, + accelerations, no decelerations  CTX: q 2-3 minutes  SVE: 4/70/-2, AROM clear fluid      ASSESSMENT:   37 y.o.  at 38w5d, IOL      PLAN:    1. Labor Management   -Continue Close Maternal/Fetal Monitoring   - Maternal VSSAF   - FHT currently cat 1   - Augmentation- pitocin   - Repeat cervical exam in 2h   - Anesthesia to place epidural      Randee Kellogg M.D.  PGY-3 OB/GYN  [phone]          "

## 2017-09-01 ENCOUNTER — ANESTHESIA EVENT (OUTPATIENT)
Dept: OBSTETRICS AND GYNECOLOGY | Facility: OTHER | Age: 37
End: 2017-09-01
Payer: COMMERCIAL

## 2017-09-01 ENCOUNTER — SURGERY (OUTPATIENT)
Age: 37
End: 2017-09-01

## 2017-09-01 ENCOUNTER — ANESTHESIA (OUTPATIENT)
Dept: OBSTETRICS AND GYNECOLOGY | Facility: OTHER | Age: 37
End: 2017-09-01
Payer: COMMERCIAL

## 2017-09-01 PROBLEM — D64.9 CHRONIC ANEMIA: Status: ACTIVE | Noted: 2017-09-01

## 2017-09-01 PROBLEM — O41.00X0 OLIGOHYDRAMNIOS: Status: RESOLVED | Noted: 2017-08-31 | Resolved: 2017-09-01

## 2017-09-01 LAB
BASOPHILS # BLD AUTO: 0.01 K/UL
BASOPHILS NFR BLD: 0.1 %
DIFFERENTIAL METHOD: ABNORMAL
EOSINOPHIL # BLD AUTO: 0.1 K/UL
EOSINOPHIL NFR BLD: 0.3 %
ERYTHROCYTE [DISTWIDTH] IN BLOOD BY AUTOMATED COUNT: 14.5 %
HCT VFR BLD AUTO: 31.8 %
HGB BLD-MCNC: 10.4 G/DL
HIV 1+2 AB+HIV1 P24 AG SERPL QL IA: NEGATIVE
LYMPHOCYTES # BLD AUTO: 2 K/UL
LYMPHOCYTES NFR BLD: 12.2 %
MCH RBC QN AUTO: 27 PG
MCHC RBC AUTO-ENTMCNC: 32.7 G/DL
MCV RBC AUTO: 83 FL
MONOCYTES # BLD AUTO: 1.7 K/UL
MONOCYTES NFR BLD: 10.9 %
NEUTROPHILS # BLD AUTO: 12.2 K/UL
NEUTROPHILS NFR BLD: 76.1 %
PLATELET # BLD AUTO: 333 K/UL
PMV BLD AUTO: 9.9 FL
RBC # BLD AUTO: 3.85 M/UL
WBC # BLD AUTO: 15.96 K/UL

## 2017-09-01 PROCEDURE — 88302 TISSUE EXAM BY PATHOLOGIST: CPT | Mod: 26,,, | Performed by: PATHOLOGY

## 2017-09-01 PROCEDURE — 88302 TISSUE EXAM BY PATHOLOGIST: CPT | Performed by: PATHOLOGY

## 2017-09-01 PROCEDURE — D9220A PRA ANESTHESIA: Mod: ,,, | Performed by: ANESTHESIOLOGY

## 2017-09-01 PROCEDURE — 11000001 HC ACUTE MED/SURG PRIVATE ROOM

## 2017-09-01 PROCEDURE — 58605 DIVISION OF FALLOPIAN TUBE: CPT

## 2017-09-01 PROCEDURE — 36415 COLL VENOUS BLD VENIPUNCTURE: CPT

## 2017-09-01 PROCEDURE — 25000003 PHARM REV CODE 250: Performed by: STUDENT IN AN ORGANIZED HEALTH CARE EDUCATION/TRAINING PROGRAM

## 2017-09-01 PROCEDURE — 37000008 HC ANESTHESIA 1ST 15 MINUTES: Performed by: OBSTETRICS & GYNECOLOGY

## 2017-09-01 PROCEDURE — 58605 DIVISION OF FALLOPIAN TUBE: CPT | Mod: ,,, | Performed by: OBSTETRICS & GYNECOLOGY

## 2017-09-01 PROCEDURE — 10907ZC DRAINAGE OF AMNIOTIC FLUID, THERAPEUTIC FROM PRODUCTS OF CONCEPTION, VIA NATURAL OR ARTIFICIAL OPENING: ICD-10-PCS | Performed by: OBSTETRICS & GYNECOLOGY

## 2017-09-01 PROCEDURE — 0UB70ZZ EXCISION OF BILATERAL FALLOPIAN TUBES, OPEN APPROACH: ICD-10-PCS | Performed by: OBSTETRICS & GYNECOLOGY

## 2017-09-01 PROCEDURE — 63600175 PHARM REV CODE 636 W HCPCS: Performed by: ANESTHESIOLOGY

## 2017-09-01 PROCEDURE — 85025 COMPLETE CBC W/AUTO DIFF WBC: CPT

## 2017-09-01 PROCEDURE — 37000009 HC ANESTHESIA EA ADD 15 MINS: Performed by: OBSTETRICS & GYNECOLOGY

## 2017-09-01 PROCEDURE — 25000003 PHARM REV CODE 250: Performed by: ANESTHESIOLOGY

## 2017-09-01 RX ORDER — FENTANYL CITRATE 50 UG/ML
INJECTION, SOLUTION INTRAMUSCULAR; INTRAVENOUS
Status: DISCONTINUED | OUTPATIENT
Start: 2017-09-01 | End: 2017-09-01

## 2017-09-01 RX ORDER — BUPIVACAINE HYDROCHLORIDE 7.5 MG/ML
INJECTION, SOLUTION INTRASPINAL
Status: DISCONTINUED | OUTPATIENT
Start: 2017-09-01 | End: 2017-09-01

## 2017-09-01 RX ORDER — SODIUM CITRATE AND CITRIC ACID MONOHYDRATE 334; 500 MG/5ML; MG/5ML
SOLUTION ORAL
Status: DISPENSED
Start: 2017-09-01 | End: 2017-09-01

## 2017-09-01 RX ORDER — SODIUM CITRATE AND CITRIC ACID MONOHYDRATE 334; 500 MG/5ML; MG/5ML
30 SOLUTION ORAL ONCE
Status: DISCONTINUED | OUTPATIENT
Start: 2017-09-01 | End: 2017-09-02 | Stop reason: HOSPADM

## 2017-09-01 RX ORDER — FAMOTIDINE 10 MG/ML
20 INJECTION INTRAVENOUS ONCE
Status: DISCONTINUED | OUTPATIENT
Start: 2017-09-01 | End: 2017-09-02 | Stop reason: HOSPADM

## 2017-09-01 RX ORDER — FAMOTIDINE 10 MG/ML
INJECTION INTRAVENOUS
Status: DISPENSED
Start: 2017-09-01 | End: 2017-09-01

## 2017-09-01 RX ORDER — FAMOTIDINE 10 MG/ML
20 INJECTION INTRAVENOUS
Status: DISCONTINUED | OUTPATIENT
Start: 2017-09-01 | End: 2017-09-02 | Stop reason: HOSPADM

## 2017-09-01 RX ADMIN — IBUPROFEN 600 MG: 600 TABLET, FILM COATED ORAL at 05:09

## 2017-09-01 RX ADMIN — NITROFURANTOIN MONOHYDRATE AND NITROFURANTOIN MACROCRYSTALLINE 100 MG: 75; 25 CAPSULE ORAL at 11:09

## 2017-09-01 RX ADMIN — HYDROCODONE BITARTRATE AND ACETAMINOPHEN 1 TABLET: 10; 325 TABLET ORAL at 10:09

## 2017-09-01 RX ADMIN — BUPIVACAINE HYDROCHLORIDE IN DEXTROSE 1.5 ML: 7.5 INJECTION, SOLUTION SUBARACHNOID at 07:09

## 2017-09-01 RX ADMIN — ONDANSETRON 8 MG: 8 TABLET, ORALLY DISINTEGRATING ORAL at 02:09

## 2017-09-01 RX ADMIN — FENTANYL CITRATE 10 MCG: 50 INJECTION, SOLUTION INTRAMUSCULAR; INTRAVENOUS at 07:09

## 2017-09-01 RX ADMIN — HYDROCODONE BITARTRATE AND ACETAMINOPHEN 1 TABLET: 5; 325 TABLET ORAL at 02:09

## 2017-09-01 RX ADMIN — IBUPROFEN 600 MG: 600 TABLET, FILM COATED ORAL at 11:09

## 2017-09-01 RX ADMIN — NITROFURANTOIN MONOHYDRATE AND NITROFURANTOIN MACROCRYSTALLINE 100 MG: 75; 25 CAPSULE ORAL at 09:09

## 2017-09-01 NOTE — ANESTHESIA PREPROCEDURE EVALUATION
2017  Pre-operative evaluation for * No surgery found *    Crys Peña is a 37 y.o. female  @ 38/5 coming in for IOL 2/2 oligo. 4 previous vaginal deliveries with epidurals.     OB History    Para Term  AB Living   6 5 5   1 4   SAB TAB Ectopic Multiple Live Births   1     0 4      # Outcome Date GA Lbr Hernan/2nd Weight Sex Delivery Anes PTL Lv   6 Term 17 38w5d / 01:25 2.76 kg (6 lb 1.4 oz) F Vag-Spont EPI N    5 SAB 2016           4 Term 12 40w0d  3.175 kg (7 lb) F Vag-Spont EPI N RUPERTO   3 Term 04 40w0d  3.175 kg (7 lb) M Vag-Spont EPI N RUPERTO   2 Term 02 37w0d  2.722 kg (6 lb) F Vag-Spont EPI N RUPERTO   1 Term 08/10/01 42w0d  3.175 kg (7 lb) F Vag-Spont EPI N RUPERTO          Patient Active Problem List   Diagnosis    Elderly multigravida    Short femur of fetus on prenatal ultrasound    Pregnancy affected by fetal growth restriction    Unwanted fertility     (spontaneous vaginal delivery)    Chronic anemia       Review of patient's allergies indicates:  No Known Allergies     Current Facility-Administered Medications on File Prior to Visit   Medication Dose Route Frequency Provider Last Rate Last Dose    acetaminophen tablet 650 mg  650 mg Oral Q6H PRN Sherice Guerrero MD        benzocaine-lanolin-aloe vera 20-0.5 % topical spray   Topical Continuous PRN Sherice Guerrero MD        diphenhydrAMINE capsule 25 mg  25 mg Oral Q4H PRN Sherice Guerrero MD        diphenhydrAMINE injection 25 mg  25 mg Intravenous Q4H PRN Sherice Guerrero MD        docusate sodium capsule 200 mg  200 mg Oral BID PRN Sherice Guerrero MD        hydrocodone-acetaminophen 10-325mg per tablet 1 tablet  1 tablet Oral Q4H PRSTACIA Guerrero MD        hydrocodone-acetaminophen 5-325mg per tablet 1 tablet  1 tablet Oral Q4H PRN Sherice Way  MD Yolanda        hydrocortisone 2.5 % rectal cream   Rectal TID PRN Sherice Guerrero MD        ibuprofen tablet 600 mg  600 mg Oral Q6H Sherice Guerrero MD   600 mg at 08/31/17 2358    lanolin cream   Topical (Top) PRN Sherice Guerrero MD        measles, mumps and rubella vaccine 1,000-12,500 TCID50/0.5 mL injection 0.5 mL  0.5 mL Subcutaneous vaccine x 1 dose Sherice Guerrero MD        nitrofurantoin (macrocrystal-monohydrate) 100 MG capsule 100 mg  100 mg Oral BID Sherice Guerrero MD   100 mg at 08/31/17 2358    ondansetron disintegrating tablet 8 mg  8 mg Oral Q8H PRN Sherice Guerrero MD   8 mg at 09/01/17 0225    promethazine (PHENERGAN) 12.5 mg in dextrose 5 % 50 mL IVPB  12.5 mg Intravenous Q6H PRN Sherice Guerrero MD        Tdap vaccine injection 0.5 mL  0.5 mL Intramuscular vaccine x 1 dose Sherice Guerrero MD         Current Outpatient Prescriptions on File Prior to Visit   Medication Sig Dispense Refill    nitrofurantoin, macrocrystal-monohydrate, (MACROBID) 100 MG capsule Take 1 capsule (100 mg total) by mouth 2 (two) times daily. 14 capsule 0    prenatal #108-iron,carbonyl-FA 30-1 mg Tab Take by mouth once daily.         Past Surgical History:   Procedure Laterality Date    DILATION AND CURETTAGE OF UTERUS      miscarriage       Social History     Social History    Marital status: Single     Spouse name: N/A    Number of children: N/A    Years of education: N/A     Occupational History    Not on file.     Social History Main Topics    Smoking status: Never Smoker    Smokeless tobacco: Never Used    Alcohol use No    Drug use: No    Sexual activity: Yes     Partners: Male     Birth control/ protection: None     Other Topics Concern    Not on file     Social History Narrative    No narrative on file         Vital Signs Range (Last 24H):  Temp:  [35.8 °C (96.4 °F)-36.4 °C (97.5 °F)]   Pulse:  []   Resp:  [16-18]   BP:  ()/(53-82)   SpO2:  [94 %-100 %]       CBC:   Recent Labs      08/31/17   1218  09/01/17   0415   WBC  4.85  4.85  15.96*   RBC  3.48*  3.48*  3.85*   HGB  9.3*  9.3*  10.4*   HCT  28.8*  28.8*  31.8*   PLT  SEE COMMENT  SEE COMMENT  333   MCV  83  83  83   MCH  26.7*  26.7*  27.0   MCHC  32.3  32.3  32.7       CMP: No results for input(s): NA, K, CL, CO2, BUN, CREATININE, GLU, MG, PHOS, CALCIUM, ALBUMIN, PROT, ALKPHOS, ALT, AST, BILITOT in the last 72 hours.    INR  No results for input(s): INR, PROTIME, APTT in the last 72 hours.    Invalid input(s): PT    Pre-op Assessment    I have reviewed the Patient Summary Reports.     I have reviewed the Nursing Notes.   I have reviewed the Medications.     Review of Systems  Anesthesia Hx:  No problems with previous Anesthesia  History of prior surgery of interest to airway management or planning: Denies Family Hx of Anesthesia complications.   Denies Personal Hx of Anesthesia complications.   Hematology/Oncology:  Hematology Normal   Oncology Normal     EENT/Dental:EENT/Dental Normal   Cardiovascular:  Cardiovascular Normal     Pulmonary:  Pulmonary Normal    Renal/:  Renal/ Normal     Hepatic/GI:  Hepatic/GI Normal    Neurological:  Neurology Normal    Endocrine:  Endocrine Normal        Physical Exam  General:  Well nourished    Airway/Jaw/Neck:  Airway Findings: Mouth Opening: Normal Tongue: Large  General Airway Assessment: Adult  Mallampati: III  Improves to II with phonation.  TM Distance: Normal, at least 6 cm         Dental:  DENTAL FINDINGS: Normal   Chest/Lungs:  Chest/Lungs Clear    Heart/Vascular:  Heart Findings: Normal       Mental Status:  Mental Status Findings:  Cooperative, Alert and Oriented         Anesthesia Plan  Type of Anesthesia, risks & benefits discussed:  Anesthesia Type:  CSE, epidural, general, spinal  Patient's Preference: labor epidural  Intra-op Monitoring Plan: standard ASA monitors  Intra-op Monitoring Plan Comments:    Post Op Pain Control Plan:   Post Op Pain Control Plan Comments:   Induction:   IV  Beta Blocker:  Patient is not currently on a Beta-Blocker (No further documentation required).       Informed Consent: Patient understands risks and agrees with Anesthesia plan.  Questions answered. Anesthesia consent signed with patient.  ASA Score: 2     Day of Surgery Review of History & Physical:    H&P update referred to the surgeon.     Anesthesia Plan Notes: Discussed anesthetic risks associated with labor epidural including infection, bleeding, dural puncture, and neurologic complications        Ready For Surgery From Anesthesia Perspective.

## 2017-09-01 NOTE — PROGRESS NOTES
"LABOR NOTE    S:  Complaints: Painful contractions. Epidural in place, reports comfortable.     O: BP (!) 108/53   Pulse 91   Temp 96.4 °F (35.8 °C) (Temporal)   Resp 16   Ht 5' 4" (1.626 m)   Wt 92.1 kg (203 lb)   LMP 2016   SpO2 100%   Breastfeeding? No   BMI 34.84 kg/m²       FHT: 125bpm Cat 1 (reassuring), mod beat to beat variability, + accelerations, early decelerations w/ occ variable decels   CTX: q 2-3 minutes   SVE: /0    ASSESSMENT:   37 y.o.  at 38w5d, IOL      PLAN:    1. Labor Management   -Continue Close Maternal/Fetal Monitoring   - Maternal VSSAF   - FHT currently cat 1   - Augmentation- pitocin   - Repeat cervical exam in 2h    Sherice Guerrero MD  OB/GYN, PGY-2                  "

## 2017-09-01 NOTE — PROGRESS NOTES
[facility]  Obstetrics  Postpartum Progress Note    Patient Name: Crys Peña  MRN: [id number]  Admission Date: 2017  Hospital Length of Stay: 1 days  Attending Physician: Arlet Constantino MD  Primary Care Provider: Primary Doctor No    Subjective:     Principal Problem:Indication for care in labor or delivery    Hospital course: 2017 To L&D for IOL 2/2 to oligohydramnios and FUGR. Labor course managed with pitocin and AROM. , uncomplicated. See delivery note for details.   2017 Unable to tolerate dinner secondary to N/V. Pain well controlled, ambulating and voiding without difficulty.     Interval History:     She is doing well this morning, although she is not tolerating a regular diet. She vomited after dinner late in the eveing. She is voiding spontaneously. She is ambulating. She has passed flatus, and has not a BM. Vaginal bleeding is mild. She denies fever or chills. Abdominal pain is mild and controlled with oral medications. She is bottle feeding.    Objective:     Vital Signs (Most Recent):  Temp: 97.5 °F (36.4 °C) (17)  Pulse: 92 (17)  Resp: 18 (17)  BP: 120/74 (17)  SpO2: 100 % (17) Vital Signs (24h Range):  Temp:  [96.4 °F (35.8 °C)-97.5 °F (36.4 °C)] 97.5 °F (36.4 °C)  Pulse:  [] 92  Resp:  [16-18] 18  SpO2:  [94 %-100 %] 100 %  BP: ()/(53-82) 120/74     Weight: 92.1 kg (203 lb)  Body mass index is 34.84 kg/m².      Intake/Output Summary (Last 24 hours) at 17 0434  Last data filed at 17 0415   Gross per 24 hour   Intake           603.05 ml   Output             1300 ml   Net          -696.95 ml       Significant Labs:  Lab Results   Component Value Date    GROUPTRH B POS 2017    HEPBSAG Negative 2017    STREPBCULT No Group B Streptococcus isolated 2017       Recent Labs  Lab 17  1218   HGB 9.3*  9.3*   HCT 28.8*  28.8*       I have personallly reviewed all  pertinent lab results from the last 24 hours.    Physical Exam:   Constitutional: She is oriented to person, place, and time. She appears well-developed and well-nourished. No distress.    HENT:   Head: Normocephalic and atraumatic.      Cardiovascular: Normal rate and regular rhythm.     Pulmonary/Chest: Effort normal.        Abdominal: Soft. Bowel sounds are normal. She exhibits no distension. There is no tenderness. There is no rebound and no guarding.     Genitourinary:   Genitourinary Comments: Fundus firm below umbilicus           Musculoskeletal: She exhibits no edema.       Neurological: She is alert and oriented to person, place, and time.    Skin: Skin is warm and dry.    Psychiatric: She has a normal mood and affect.       Assessment/Plan:     37 y.o. female  for:    Chronic anemia    - pre-edelivery Hg 9.3  - Start Fe and scheduled colace         (spontaneous vaginal delivery)    Postpartum care:  - Patient doing well. Continue routine management and advances.  - Continue PO pain meds. Pain well controlled.  - Encourage ambulation.   - Heme: Pre Delivery h/h  --> Post Delivery pending  - Contraception - desires ppBTL  - Lactation - The patient is bottle feeding. Lactation nurse following along PRN  - Rh Status - pos        Unwanted fertility    - BTL consents signed  - plan for PP tubal at 0700        Short femur of fetus on prenatal ultrasound    - Fetal growth restriction  - Notify NICU/peds at time of delivery, short long bones            Disposition: As patient meets milestones, will plan to discharge PPD 1-2.    Sherice Barreto MD  Obstetrics  [facility]

## 2017-09-01 NOTE — PROGRESS NOTES
While receiving report on patient, patient called stating that she was about to vomit. RADHA Gant went to bedside. Patient vomited in trash can and in emesis bag. Vomit in emesis bag was bloody. Attempted to contact MD who was in delivery. Patient stated she last ate a popsicle hours before delivery and ate a turkey sandwich after delivery. Attempted to give IV phenergan but was unable due to loss of IV. PO zofran given. Patient stated she was no longer nauseous. Dr. Guerrero called again at 0244. Per MD, monitor patient and call back if she vomits again.

## 2017-09-01 NOTE — PROGRESS NOTES
"LABOR NOTE    S:  Complaints: Painful contractions. Epidural in place, reports comfortable.     O: BP (!) 108/53   Pulse 91   Temp 96.4 °F (35.8 °C) (Temporal)   Resp 16   Ht 5' 4" (1.626 m)   Wt 92.1 kg (203 lb)   LMP 2016   SpO2 100%   Breastfeeding? No   BMI 34.84 kg/m²       FHT: 125bpm Cat 1 (reassuring), mod beat to beat variability, + accelerations, early decelerations  CTX: q 2-3 minutes (pitocin at 4mu)  SVE: /-2, unchanged    ASSESSMENT:   37 y.o.  at 38w5d, IOL      PLAN:    1. Labor Management   -Continue Close Maternal/Fetal Monitoring   - Maternal VSSAF   - FHT currently cat 1   - Augmentation- pitocin, increased to 6mu this check   - Repeat cervical exam in 2h    Sherice Guerrero MD  OB/GYN, PGY-2                  "

## 2017-09-01 NOTE — ANESTHESIA PROCEDURE NOTES
CSE    Patient location during procedure: OR  Start time: 9/1/2017 6:58 AM  Timeout: 9/1/2017 6:58 AM  End time: 9/1/2017 7:16 AM  Staffing  Anesthesiologist: KRISTOPHER MURDOCK  Resident/CRNA: MELVI RAJAN  Performed: anesthesiologist and resident/CRNA   Preanesthetic Checklist  Completed: patient identified, site marked, surgical consent, pre-op evaluation, timeout performed, IV checked, risks and benefits discussed and monitors and equipment checked  CSE  Patient position: sitting  Prep: ChloraPrep  Patient monitoring: heart rate, cardiac monitor and continuous pulse ox  Approach: midline  Spinal Needle  Needle type: pencil-tip   Needle gauge: 25 G  Needle length: 5 in  Epidural Needle  Injection technique: CARINE air  Needle type: Tuohy   Needle gauge: 17 G  Needle length: 3.5 in  Needle insertion depth: 8 cm  Location: L3-4  Needle localization: anatomical landmarks  Catheter  Catheter type: springwound  Catheter size: 19 G  Catheter at skin depth: 11.5 cm  Assessment  Sensory level: T5   Dermatomal levels determined by pinch or prick  Intrathecal Medications:  Bolus administered: 1.5 mL of 0.75 bupivacaine  administered: primary anesthetic and 10 mcg of  fentanyl

## 2017-09-01 NOTE — PLAN OF CARE
Problem: Patient Care Overview  Goal: Plan of Care Review  Outcome: Ongoing (interventions implemented as appropriate)  Pt tolerating regular diet, ambulating to BR at 1800 with standby assistance per PCT, voiding trial in progress, [place] d/c at 1700 s/p BTL, caring for infant and Formula feeding,  pain controlled. VSS, safety maintained.

## 2017-09-01 NOTE — BRIEF OP NOTE
Operative Note  Post-Partum Bilateral Tubal Ligation    Date of Procedure: 09/01/2017    Pre-Operative Diagnosis:   1.Desires permanent sterilization,   Post-Partum Day # 1    Post-Operative Diagnosis: Same    Procedure: Post-Partum BTL via [place] method    Surgeon: Dr. Quinton Thakur  Assistant: Dr. Leyla Stone    Anesthesia: spinal    EBL:20mL    IVF:none cc    UOP: 50cc    Specimen: Bilateral Tubal Segments    Complications: None    Findings: Bilateral fallopian tubes normal in appearance, Post partum uterus 20 week size.    Procedure in Detail:  After verifying consent, the patient was taken to the OR where she was placed in the dorsal supine position after spinal anesthesia was found to be adequate.  The abdomen was then prepped and draped in the normal, sterile, fashion and two allis clamps were placed lateral to the umbilicus and lateral traction was applied.  The skin incision was made using the scalpel inferior to the umbilicus.  The fascia was then identified and grasped with two farnaz clamps and entered sharply using the scapel.  The peritoneum was then identified, grasped with 2 hemostats and entered sharply using the metzenbaum scissors.    Two [employer] retractors were used to expose the left cornua of the uterus, the right tube was identified and brought out of the incision using a renee clamp.  The tube was then followed out to the fimbriated end.  The isthmic portion of the tube was grasped in an avascular portion and elevated with the renee clamp and a 2 cm portion of fallopian tube was ligated with 2-0Vicryl using the [place] method.  The tubal portion was then excised and sent to pathology for confirmation.  After hemostasis was verified, the tube was carefully placed back into the abdominal cavity. The right tube was then identified and brought out of the incision using a renee clamp.  The tube was then followed out to the fimbriated end.  The isthmic portion of the tube was grasped in an  avascular portion and elevated with the renee clamp and a 2 cm portion of fallopian tube was ligated with 2-0Vicryl using the [place] method.  The tubal portion was then excised and sent to pathology for confirmation.  After hemostasis was verified, the tube was carefully placed back into the abdominal cavity.    The umbilical fascia was closed using 0 Vicryl.  The skin was closed in a subcuticular fashion using 4-0 monocryl.  The patient tolerated the procedure well, S/L/N counts were correct x 2, patient was taken to recovery in stable condition.    Leyla Stone MD

## 2017-09-01 NOTE — SUBJECTIVE & OBJECTIVE
Hospital course: 2017 To L&D for IOL 2/2 to oligohydramnios and FUGR. Labor course managed with pitocin and AROM. , uncomplicated. See delivery note for details.   2017 Unable to tolerate dinner secondary to N/V. Pain well controlled, ambulating and voiding without difficulty.     Interval History:     She is doing well this morning, although she is not tolerating a regular diet. She vomited after dinner late in the eveing. She is voiding spontaneously. She is ambulating. She has passed flatus, and has not a BM. Vaginal bleeding is mild. She denies fever or chills. Abdominal pain is mild and controlled with oral medications. She is bottle feeding.    Objective:     Vital Signs (Most Recent):  Temp: 97.5 °F (36.4 °C) (17)  Pulse: 92 (17)  Resp: 18 (17)  BP: 120/74 (17)  SpO2: 100 % (17) Vital Signs (24h Range):  Temp:  [96.4 °F (35.8 °C)-97.5 °F (36.4 °C)] 97.5 °F (36.4 °C)  Pulse:  [] 92  Resp:  [16-18] 18  SpO2:  [94 %-100 %] 100 %  BP: ()/(53-82) 120/74     Weight: 92.1 kg (203 lb)  Body mass index is 34.84 kg/m².      Intake/Output Summary (Last 24 hours) at 17 0434  Last data filed at 17 0415   Gross per 24 hour   Intake           603.05 ml   Output             1300 ml   Net          -696.95 ml       Significant Labs:  Lab Results   Component Value Date    GROUPTRH B POS 2017    HEPBSAG Negative 2017    STREPBCULT No Group B Streptococcus isolated 2017       Recent Labs  Lab 17  1218   HGB 9.3*  9.3*   HCT 28.8*  28.8*       I have personallly reviewed all pertinent lab results from the last 24 hours.    Physical Exam:   Constitutional: She is oriented to person, place, and time. She appears well-developed and well-nourished. No distress.    HENT:   Head: Normocephalic and atraumatic.      Cardiovascular: Normal rate and regular rhythm.     Pulmonary/Chest: Effort normal.        Abdominal:  Soft. Bowel sounds are normal. She exhibits no distension. There is no tenderness. There is no rebound and no guarding.     Genitourinary:   Genitourinary Comments: Fundus firm below umbilicus           Musculoskeletal: She exhibits no edema.       Neurological: She is alert and oriented to person, place, and time.    Skin: Skin is warm and dry.    Psychiatric: She has a normal mood and affect.

## 2017-09-01 NOTE — ANESTHESIA POSTPROCEDURE EVALUATION
"Anesthesia Post Evaluation    Patient: Crys Peña    Procedure(s) Performed: Procedure(s) (LRB):  LIGATION-TUBAL-POST PARTUM (Bilateral)    Final Anesthesia Type: spinal  Patient location during evaluation: PACU  Patient participation: Yes- Able to Participate  Level of consciousness: awake and alert  Post-procedure vital signs: reviewed and stable  Pain management: adequate  Airway patency: patent  PONV status at discharge: No PONV  Anesthetic complications: no      Cardiovascular status: hemodynamically stable  Respiratory status: unassisted, spontaneous ventilation and room air  Hydration status: euvolemic  Follow-up not needed.        Visit Vitals  /80   Pulse 73   Temp 36.6 °C (97.8 °F) (Axillary)   Resp 18   Ht 5' 4" (1.626 m)   Wt 92.1 kg (203 lb)   LMP 12/03/2016   SpO2 98%   Breastfeeding? Unknown   BMI 34.84 kg/m²       Pain/Geronimo Score: Pain Rating Prior to Med Admin: 6 (9/1/2017  2:45 PM)      "

## 2017-09-01 NOTE — L&D DELIVERY NOTE
cephalic with 1 maternal push.  Under epidural anesthesia.  Infant delivered OA over intact perineum.  No nuchal.   Female infant also tolerated the delivery well and was placed on mothers abdomen for skin to skin and bulb suctioning performed.  Cord clamped and cut after 60 seconds delayed cord clamping and pulsations noted to be diminished.   Umbilical arterial gas and venous blood obtained.  Placenta delivered spontaneously and IV pitocin given.  No lacerations.   Uterine tone noted. No cervical lacerations.  Patient tolerated delivery well.   cc  Staff present for entire procedure.  S/L/N counts correct x2.    Sherice Guerrero MD  OB/GYN, PGY-2       Delivery Information for  Cecilio Peña    Birth information:  YOB: 2016   Time of birth: 9:55 PM   Sex: female   Head Delivery Date/Time: 2017  9:55 PM   Delivery type: Vaginal, Spontaneous Delivery   Gestational Age: 38w5d    Delivery Providers    Delivering clinician:  Arlet Constantino MD   Other personnel:   Provider Role   Sherice Guerrero MD Resident   Meghann Martinez RN Delivery Nurse   Flavia DEVLIN [facility] Surgical Tech   El Pollard RN Registered Nurse                Measurements    Weight:  2760 g Length:  43.2 cm   Head circum.:  31.1 cm Chest circum.:  29.2 cm          [place] Assessment    Apgars:     1 Minute:   5 Minute:   10 Minute:   15 Minute:   20 Minute:     Skin Color:   0  1       Heart Rate:   2  2       Reflex Irritability:   2  2       Muscle Tone:   2  2       Respiratory Effort:   2  2       Total:   8  9               Apgars Assigned By:  EL POLLARD RN         Assisted Delivery Details:    Forceps attempted?:  No  Vacuum extractor attempted?:  No         Shoulder Dystocia    Shoulder dystocia present?:  No           Presentation and Position    Presentation:   Vertex   Position:   Middle    Occiput    Anterior            Interventions/Resuscitation    Method:  Tactile  Stimulation       Cord    Vessels:  3 vessels  Complications:  None  Delayed Cord Clamping?:  Yes  Cord Clamped Date/Time:  2017  9:56 PM  Cord Blood Disposition:  Sent with Baby  Gases Sent?:  Yes  Stem Cell Collection (by MD):  No       Placenta    Date and time:  2017  9:59 PM  Removal:  Spontaneous  Appearance:  Intact  Placenta disposition:  discarded           Labor Events:       labor: No     Labor Onset Date/Time:         Dilation Complete Date/Time: 2017 20:30     Start Pushing Date/Time: 2017 21:55     Rupture Date/Time:              Rupture type:           Fluid Amount:        Fluid Color:        Fluid Odor:        Membrane Status (PeriCalm): ARM (Artificial Rupture)      Rupture Date/Time (PeriCalm): 2017 16:45:00      Fluid Amount (PeriCalm): Moderate      Fluid Color (PeriCalm): Clear       steroids: None     Antibiotics given for GBS: No     Induction: oxytocin     Indications for induction:  Elective     Augmentation: amniotomy     Indications for augmentation: Ineffective Contraction Pattern     Labor complications: None     Additional complications:          Cervical ripening:                     Delivery:      Episiotomy: None     Indication for Episiotomy:       Perineal Lacerations: None Repaired:      Periurethral Laceration: none Repaired:     Labial Laceration: none Repaired:     Sulcus Laceration: none Repaired:     Vaginal Laceration: No Repaired:     Cervical Laceration: No Repaired:     Repair suture:       Repair # of packets:       Vaginal delivery QBL (mL): 150      QBL (mL): 0     Combined Blood Loss (mL): 150     Vaginal Sweep Performed: Yes     Surgicount Correct: Yes       Other providers:       Anesthesia    Method:  Epidural          Details (if applicable):  Trial of Labor      Categorization:      Priority:     Indications for :     Incision Type:       Additional  information:  Forceps:     Vacuum:    Breech:    Observed anomalies    Other (Comments):

## 2017-09-01 NOTE — ASSESSMENT & PLAN NOTE
Postpartum care:  - Patient doing well. Continue routine management and advances.  - Continue PO pain meds. Pain well controlled.  - Encourage ambulation.   - Heme: Pre Delivery h/h 9/28 --> Post Delivery pending  - Contraception - desires ppBTL  - Lactation - The patient is bottle feeding. Lactation nurse following along PRN  - Rh Status - pos

## 2017-09-01 NOTE — TRANSFER OF CARE
"Anesthesia Transfer of Care Note    Patient: Crys Peña    Procedure(s) Performed: Procedure(s) (LRB):  LIGATION-TUBAL-POST PARTUM (Bilateral)    Patient location: PACU    Anesthesia Type: CSE    Transport from OR: Transported from OR on room air with adequate spontaneous ventilation    Post pain: adequate analgesia    Post assessment: no apparent anesthetic complications    Post vital signs: stable    Level of consciousness: awake, alert and oriented    Nausea/Vomiting: no nausea/vomiting    Complications: none    Transfer of care protocol was followed      Last vitals:   Visit Vitals  /74   Pulse 92   Temp 36.4 °C (97.5 °F) (Axillary)   Resp 18   Ht 5' 4" (1.626 m)   Wt 92.1 kg (203 lb)   LMP 12/03/2016   SpO2 100%   Breastfeeding? Unknown   BMI 34.84 kg/m²     "

## 2017-09-01 NOTE — ANESTHESIA POSTPROCEDURE EVALUATION
"Anesthesia Post Evaluation    Patient: Crys Peña    Procedure(s) Performed: * No procedures listed *    Final Anesthesia Type: epidural  Patient location during evaluation: floor  Patient participation: Yes- Able to Participate  Level of consciousness: awake and alert  Post-procedure vital signs: reviewed and stable  Pain management: adequate  Airway patency: patent  PONV status at discharge: No PONV  Anesthetic complications: no      Cardiovascular status: hemodynamically stable and blood pressure returned to baseline  Respiratory status: unassisted, spontaneous ventilation and room air  Hydration status: euvolemic  Follow-up not needed.        Visit Vitals  /80   Pulse 73   Temp 36.6 °C (97.8 °F) (Axillary)   Resp 18   Ht 5' 4" (1.626 m)   Wt 92.1 kg (203 lb)   LMP 12/03/2016   SpO2 98%   Breastfeeding? Unknown   BMI 34.84 kg/m²       Pain/Geronimo Score: Pain Rating Prior to Med Admin: 6 (9/1/2017  2:45 PM)      "

## 2017-09-02 VITALS
DIASTOLIC BLOOD PRESSURE: 79 MMHG | RESPIRATION RATE: 18 BRPM | OXYGEN SATURATION: 94 % | WEIGHT: 203 LBS | SYSTOLIC BLOOD PRESSURE: 116 MMHG | BODY MASS INDEX: 34.66 KG/M2 | HEART RATE: 71 BPM | TEMPERATURE: 98 F | HEIGHT: 64 IN

## 2017-09-02 PROCEDURE — 25000003 PHARM REV CODE 250: Performed by: STUDENT IN AN ORGANIZED HEALTH CARE EDUCATION/TRAINING PROGRAM

## 2017-09-02 PROCEDURE — 99024 POSTOP FOLLOW-UP VISIT: CPT | Mod: ,,, | Performed by: OBSTETRICS & GYNECOLOGY

## 2017-09-02 RX ORDER — IBUPROFEN 600 MG/1
600 TABLET ORAL EVERY 6 HOURS
Qty: 60 TABLET | Refills: 0 | Status: SHIPPED | OUTPATIENT
Start: 2017-09-02 | End: 2019-08-08

## 2017-09-02 RX ADMIN — NITROFURANTOIN MONOHYDRATE AND NITROFURANTOIN MACROCRYSTALLINE 100 MG: 75; 25 CAPSULE ORAL at 08:09

## 2017-09-02 RX ADMIN — HYDROCODONE BITARTRATE AND ACETAMINOPHEN 1 TABLET: 5; 325 TABLET ORAL at 03:09

## 2017-09-02 RX ADMIN — IBUPROFEN 600 MG: 600 TABLET, FILM COATED ORAL at 12:09

## 2017-09-02 RX ADMIN — IBUPROFEN 600 MG: 600 TABLET, FILM COATED ORAL at 06:09

## 2017-09-02 NOTE — PROGRESS NOTES
[facility]  Obstetrics  Postpartum Progress Note    Patient Name: Crys Peña  MRN: [id number]  Admission Date: 2017  Hospital Length of Stay: 2 days  Attending Physician: Arlet Constantino MD  Primary Care Provider: Primary Doctor No    Subjective:     Principal Problem: (spontaneous vaginal delivery)    Hospital course: 2017 To L&D for IOL 2/2 to oligohydramnios and FUGR. Labor course managed with pitocin and AROM. , uncomplicated. See delivery note for details.   2017 Unable to tolerate dinner secondary to N/V. Pain well controlled, ambulating and voiding without difficulty.   2017 - PPD #2, patient doing well and ready for discharge. POD #1 s/p BTL    Interval History:     She is doing well this morning, although she is not tolerating a regular diet. She vomited after dinner late in the eveing. She is voiding spontaneously. She is ambulating. She has passed flatus, and has not a BM. Vaginal bleeding is mild. She denies fever or chills. Abdominal pain is mild and controlled with oral medications. She is bottle feeding.    Objective:     Vital Signs (Most Recent):  Temp: 97.9 °F (36.6 °C) (17)  Pulse: 79 (17)  Resp: 17 (17)  BP: 127/89 (17)  SpO2: 98 % (17) Vital Signs (24h Range):  Temp:  [97.8 °F (36.6 °C)-98.2 °F (36.8 °C)] 97.9 °F (36.6 °C)  Pulse:  [71-98] 79  Resp:  [16-18] 17  SpO2:  [96 %-98 %] 98 %  BP: (104-136)/(63-89) 127/89     Weight: 92.1 kg (203 lb)  Body mass index is 34.84 kg/m².      Intake/Output Summary (Last 24 hours) at 17 08  Last data filed at 17   Gross per 24 hour   Intake                0 ml   Output             1750 ml   Net            -1750 ml       Significant Labs:  Lab Results   Component Value Date    GROUPTRH B POS 2017    HEPBSAG Negative 2017    STREPBCULT No Group B Streptococcus isolated 2017       Recent Labs  Lab 17  0413    HGB 10.4*   HCT 31.8*       I have personallly reviewed all pertinent lab results from the last 24 hours.    Physical Exam:   Constitutional: She is oriented to person, place, and time. She appears well-developed and well-nourished. No distress.    HENT:   Head: Normocephalic and atraumatic.      Cardiovascular: Normal rate and regular rhythm.     Pulmonary/Chest: Effort normal.        Abdominal: Soft. Bowel sounds are normal. She exhibits no distension. There is no tenderness. There is no rebound and no guarding.     Genitourinary:   Genitourinary Comments: Fundus firm below umbilicus           Musculoskeletal: She exhibits no edema.       Neurological: She is alert and oriented to person, place, and time.    Skin: Skin is warm and dry.    Psychiatric: She has a normal mood and affect.       Assessment/Plan:     37 y.o. female  for:    Chronic anemia    - pre-edelivery Hg 9.3  - Start Fe and scheduled colace        Unwanted fertility    - BTL consents signed  - plan for PP tubal at 0700        Short femur of fetus on prenatal ultrasound    - Fetal growth restriction  - Notify NICU/peds at time of delivery, short long bones        *  (spontaneous vaginal delivery)    Postpartum care:  - Patient doing well. Continue routine management and advances.  - Continue PO pain meds. Pain well controlled.  - Encourage ambulation.   - Heme: Pre Delivery h/h  --> Post Delivery pending  - Contraception - desires ppBTL  - Lactation - The patient is bottle feeding. Lactation nurse following along PRN  - Rh Status - pos            Disposition: As patient meets milestones, will plan to discharge today.    Simon South MD  Obstetrics  [facility]

## 2017-09-02 NOTE — SUBJECTIVE & OBJECTIVE
Hospital course: 2017 To L&D for IOL 2/2 to oligohydramnios and FUGR. Labor course managed with pitocin and AROM. , uncomplicated. See delivery note for details.   2017 Unable to tolerate dinner secondary to N/V. Pain well controlled, ambulating and voiding without difficulty.   2017 - PPD #2, patient doing well and ready for discharge. POD #1 s/p BTL    Interval History:     She is doing well this morning, although she is not tolerating a regular diet. She vomited after dinner late in the eveing. She is voiding spontaneously. She is ambulating. She has passed flatus, and has not a BM. Vaginal bleeding is mild. She denies fever or chills. Abdominal pain is mild and controlled with oral medications. She is bottle feeding.    Objective:     Vital Signs (Most Recent):  Temp: 97.9 °F (36.6 °C) (17)  Pulse: 79 (17)  Resp: 17 (17)  BP: 127/89 (17)  SpO2: 98 % (17) Vital Signs (24h Range):  Temp:  [97.8 °F (36.6 °C)-98.2 °F (36.8 °C)] 97.9 °F (36.6 °C)  Pulse:  [71-98] 79  Resp:  [16-18] 17  SpO2:  [96 %-98 %] 98 %  BP: (104-136)/(63-89) 127/89     Weight: 92.1 kg (203 lb)  Body mass index is 34.84 kg/m².      Intake/Output Summary (Last 24 hours) at 17 0813  Last data filed at 17 2045   Gross per 24 hour   Intake                0 ml   Output             1750 ml   Net            -1750 ml       Significant Labs:  Lab Results   Component Value Date    GROUPTRH B POS 2017    HEPBSAG Negative 2017    STREPBCULT No Group B Streptococcus isolated 2017       Recent Labs  Lab 17  0415   HGB 10.4*   HCT 31.8*       I have personallly reviewed all pertinent lab results from the last 24 hours.    Physical Exam:   Constitutional: She is oriented to person, place, and time. She appears well-developed and well-nourished. No distress.    HENT:   Head: Normocephalic and atraumatic.      Cardiovascular: Normal rate and regular  rhythm.     Pulmonary/Chest: Effort normal.        Abdominal: Soft. Bowel sounds are normal. She exhibits no distension. There is no tenderness. There is no rebound and no guarding.     Genitourinary:   Genitourinary Comments: Fundus firm below umbilicus           Musculoskeletal: She exhibits no edema.       Neurological: She is alert and oriented to person, place, and time.    Skin: Skin is warm and dry.    Psychiatric: She has a normal mood and affect.

## 2017-09-02 NOTE — DISCHARGE SUMMARY
[facility]  Obstetrics  Discharge Summary      Patient Name: Crys Peña  MRN: [id number]  Admission Date: 2017  Hospital Length of Stay: 2 days  Discharge Date and Time:  2017 8:14 AM  Attending Physician: Arlet Constantino MD   Discharging Provider: Simon South MD  Primary Care Provider: Primary Doctor No    HPI: Crys Peña is a 37 y.o. [id number] at 38w5d presents from PNT for IOL 2/2 Oligohydramnios and FGR (EFW 7%).     This IUP is complicated by oligohydraminos, AMA, FGR (EFW <7% 2098g on ), and undesired fertility.  Patient denies contractions, denies vaginal bleeding, denies LOF.   Fetal Movement: normal.       Procedure(s) (LRB):  LIGATION-TUBAL-POST PARTUM (Bilateral)     Hospital Course:   2017 To L&D for IOL 2/2 to oligohydramnios and FUGR. Labor course managed with pitocin and AROM. , uncomplicated. See delivery note for details.   2017 Unable to tolerate dinner secondary to N/V. Pain well controlled, ambulating and voiding without difficulty.   2017 - PPD #2, patient doing well and ready for discharge. POD #1 s/p BTL        Final Active Diagnoses:    Diagnosis Date Noted POA    PRINCIPAL PROBLEM:   (spontaneous vaginal delivery) [O80] 2017 Not Applicable    Chronic anemia [D64.9] 2017 Unknown    Unwanted fertility [Z30.09] 2017 Unknown    Short femur of fetus on prenatal ultrasound [O28.3] 2017 Yes      Problems Resolved During this Admission:    Diagnosis Date Noted Date Resolved POA    Oligohydramnios [O41.00X0] 2017 Yes    Indication for care in labor or delivery [O75.9] 2017 Unknown        Labs:   CMP No results for input(s): NA, K, CL, CO2, GLU, BUN, CREATININE, CALCIUM, PROT, ALBUMIN, BILITOT, ALKPHOS, AST, ALT, ANIONGAP, ESTGFRAFRICA, EGFRNONAA in the last 48 hours. and CBC   Recent Labs  Lab 17  1218 17  0415   WBC 4.85  4.85 15.96*   HGB 9.3*  9.3*  10.4*   HCT 28.8*  28.8* 31.8*   PLT SEE COMMENT  SEE COMMENT 333       Feeding Method: breast    Immunizations     Date Immunization Status Dose Route/Site Given by    09/02/17 0804 MMR Deferred 0.5 mL Subcutaneous/Left deltoid Moise Callahan RN    09/02/17 0804 Tdap Deferred 0.5 mL Intramuscular/Left deltoid Moise Callahan RN          Delivery:    Episiotomy: None   Lacerations: None   Repair suture:     Repair # of packets:     Blood loss (ml): 150     Birth information:  YOB: 2016   Time of birth: 9:55 PM   Sex: female   Delivery type: Vaginal, Spontaneous Delivery   Gestational Age: 38w5d    Delivery Clinician:      Other providers:       Additional  information:  Forceps:    Vacuum:    Breech:    Observed anomalies      Living?:           APGARS  One minute Five minutes Ten minutes   Skin color:         Heart rate:         Grimace:         Muscle tone:         Breathing:         Totals: 8  9        Placenta: Delivered:       appearance    Pending Diagnostic Studies:     None          Discharged Condition: good    Disposition: Home or Self Care    Follow Up:  Follow-up Information     Arlet Constantino MD In 6 weeks.    Specialty:  Obstetrics and Gynecology  Why:  Postpartum visit  Contact information:  [address] [facility] [id number] [phone]                 Patient Instructions:     Diet general     Activity as tolerated     Call MD for:  temperature >100.4     Call MD for:  persistent nausea and vomiting or diarrhea     Call MD for:  severe uncontrolled pain     Call MD for:  redness, tenderness, or signs of infection (pain, swelling, redness, odor or green/yellow discharge around incision site)     Call MD for:  difficulty breathing or increased cough     Call MD for:  severe persistent headache     Call MD for:  persistent dizziness, light-headedness, or visual disturbances     Call MD for:  increased confusion or weakness       Medications:  Current Discharge Medication  List      START taking these medications    Details   ibuprofen (ADVIL,MOTRIN) 600 MG tablet Take 1 tablet (600 mg total) by mouth every 6 (six) hours.  Qty: 60 tablet, Refills: 0         CONTINUE these medications which have NOT CHANGED    Details   nitrofurantoin, macrocrystal-monohydrate, (MACROBID) 100 MG capsule Take 1 capsule (100 mg total) by mouth 2 (two) times daily.  Qty: 14 capsule, Refills: 0    Associated Diagnoses: Acute cystitis without hematuria      prenatal #108-iron,carbonyl-FA 30-1 mg Tab Take by mouth once daily.             Simon South MD  Obstetrics  [facility]

## 2017-09-07 ENCOUNTER — TELEPHONE (OUTPATIENT)
Dept: OBSTETRICS AND GYNECOLOGY | Facility: CLINIC | Age: 37
End: 2017-09-07

## 2017-09-26 ENCOUNTER — PATIENT MESSAGE (OUTPATIENT)
Dept: OBSTETRICS AND GYNECOLOGY | Facility: CLINIC | Age: 37
End: 2017-09-26

## 2017-10-12 ENCOUNTER — PATIENT MESSAGE (OUTPATIENT)
Dept: OBSTETRICS AND GYNECOLOGY | Facility: CLINIC | Age: 37
End: 2017-10-12

## 2017-12-19 ENCOUNTER — PATIENT MESSAGE (OUTPATIENT)
Dept: OBSTETRICS AND GYNECOLOGY | Facility: CLINIC | Age: 37
End: 2017-12-19

## 2018-02-16 ENCOUNTER — PATIENT MESSAGE (OUTPATIENT)
Dept: OBSTETRICS AND GYNECOLOGY | Facility: CLINIC | Age: 38
End: 2018-02-16

## 2019-08-08 ENCOUNTER — OFFICE VISIT (OUTPATIENT)
Dept: FAMILY MEDICINE | Facility: CLINIC | Age: 39
End: 2019-08-08
Payer: COMMERCIAL

## 2019-08-08 VITALS
SYSTOLIC BLOOD PRESSURE: 110 MMHG | HEART RATE: 70 BPM | DIASTOLIC BLOOD PRESSURE: 82 MMHG | WEIGHT: 207 LBS | OXYGEN SATURATION: 98 % | BODY MASS INDEX: 35.34 KG/M2 | TEMPERATURE: 98 F | HEIGHT: 64 IN

## 2019-08-08 DIAGNOSIS — D64.89 ANEMIA DUE TO OTHER CAUSE, NOT CLASSIFIED: ICD-10-CM

## 2019-08-08 DIAGNOSIS — R04.0 EPISTAXIS: ICD-10-CM

## 2019-08-08 DIAGNOSIS — R51.9 NONINTRACTABLE HEADACHE, UNSPECIFIED CHRONICITY PATTERN, UNSPECIFIED HEADACHE TYPE: Primary | ICD-10-CM

## 2019-08-08 DIAGNOSIS — E66.9 OBESITY (BMI 30-39.9): ICD-10-CM

## 2019-08-08 PROBLEM — O36.5990 PREGNANCY AFFECTED BY FETAL GROWTH RESTRICTION: Status: RESOLVED | Noted: 2017-07-17 | Resolved: 2019-08-08

## 2019-08-08 PROBLEM — O09.529 ELDERLY MULTIGRAVIDA: Status: RESOLVED | Noted: 2017-02-15 | Resolved: 2019-08-08

## 2019-08-08 PROBLEM — D64.9 CHRONIC ANEMIA: Status: RESOLVED | Noted: 2017-09-01 | Resolved: 2019-08-08

## 2019-08-08 PROBLEM — O35.HXX0 SHORT FEMUR OF FETUS ON PRENATAL ULTRASOUND: Status: RESOLVED | Noted: 2017-07-06 | Resolved: 2019-08-08

## 2019-08-08 PROBLEM — Z30.09 UNWANTED FERTILITY: Status: RESOLVED | Noted: 2017-08-31 | Resolved: 2019-08-08

## 2019-08-08 PROCEDURE — 99203 PR OFFICE/OUTPT VISIT, NEW, LEVL III, 30-44 MIN: ICD-10-PCS | Mod: S$GLB,,, | Performed by: FAMILY MEDICINE

## 2019-08-08 PROCEDURE — 99203 OFFICE O/P NEW LOW 30 MIN: CPT | Mod: S$GLB,,, | Performed by: FAMILY MEDICINE

## 2019-08-08 PROCEDURE — 99999 PR PBB SHADOW E&M-EST. PATIENT-LVL III: ICD-10-PCS | Mod: PBBFAC,,, | Performed by: FAMILY MEDICINE

## 2019-08-08 PROCEDURE — 99999 PR PBB SHADOW E&M-EST. PATIENT-LVL III: CPT | Mod: PBBFAC,,, | Performed by: FAMILY MEDICINE

## 2019-08-08 NOTE — PROGRESS NOTES
Office Visit    Patient Name: Crys Peña    : 1980  MRN: [id number]      Assessment/Plan:  Crys Peña is a 39 y.o. female who presents today for :    Nonintractable headache, unspecified chronicity pattern, unspecified headache type  -resolved, neuro exam unremarkable, given history, likely due to mild dehydration, no red flags, will monitor closely  -advised adequate water intake daily, especially during alcohol consumption, which I advised consuming in moderation (no more than 2 drinks per day)  -may use PRN medications (Tylenol/NSAIDs) for Sx management  -also d/w pt regarding stress management, meditation, mm stretching exercises, heat/cold packs    Epistaxis - resolved, advised adequate hydration  -Reassurance provided to patient   -Avoid Nasal manipulation or forceful nose blowing if possible, sneeze with mouth open, use of air humidifier at home  -Apply direct compression of nostrils and leaning forward continuously for at least 5 minutes - may apply pressure for up to 20 minutes.   -Alleviate drying with nasal saline several times per day, or PRN vaselin      Obesity (BMI 30-39.9)  -discussed regular physical exercises/healthy nutrition and food choices.   -stressed to patient about the importance of portion control to help keep cholesterol/BP/BG levels in check    Anemia due to other cause, not classified  -     Ferritin; Future  -     Iron and TIBC; Future  -recheck iron panel  - iron supplements and iron rich diet      Follow up for worsening Sx. Urgent care/ED precautions provided.     This note was created by combination of typed  and Dragon dictation.  Transcription errors may be present.  If there are any questions, please contact me.        ----------------------------------------------------------------------------------------------------------------------      HPI:  Patient Care Team:  Shaun Melara MD as PCP - General (Family Medicine)    Crys is a 39 y.o.  female with      Patient Active Problem List   Diagnosis    Anemia    Obesity (BMI 30-39.9)     This patient is new to me     Patient presents today for :  Headache and Epistaxis    Headache  -had 2 episodes, once over a week ago, and second episode four days ago, started early in the morning, lasted for 2-3 hours and went away after she took a Tylenol. She thinks that it may be related to a large portion of nachos she ate the night before. She admits that she doesn't drink much fluids and that she consumes tea occasionally. She denies HA waking her up from sleep. Doesn't seem to radiate anywhere else  Denies consumption of coffee/herbal supplements.  No new medications/drugs/alcohol abuse. She also denies any F/C/N/V/palpitations/CP/IRIZARRY/SOB/vision changes/photophobia or phonophobia/syncope/facial or muscular weakness/neck stiffness/tingling/numbness/abd pain/swelling/bowel or bladder changes.   No slurring of speech/confusion/falling or gait changes.    She also had two acute episodes of nosebleeds the past week after she wiped her nose and sneezed hard. Bleeding stopped within 1-2 minutes after she wiped. No injury to nose nor face. No nasal congestion. She does endorse having dry nose most of the time. No nasal nor facial pain.        Additional ROS    No F/C/wt changes/fatigue  No dysphagia/sore throat/rhinorrhea  No CP/IRIZARRY/palpitations/swelling  No cough/wheezing/SOB  No nausea/vomiting/abd pain/no diarrhea, no constipation, no blood in stool  No muscle aches/joint pain   No rashes  No MSK weakness/tingling/numbness  No anxiety/depression  No bleeding/bruising/swelling/lumps in axilla, groin, or neck  No rash, no history of allergies to any specific substances                Patient Active Problem List   Diagnosis    Anemia    Obesity (BMI 30-39.9)       Current Medications  Medications reviewed and updated.     No current outpatient medications on file.    Past Surgical History:   Procedure Laterality Date  "   DILATION AND CURETTAGE OF UTERUS      miscarriage    LIGATION-TUBAL-POST PARTUM Bilateral 9/1/2017    Performed by Quinton Thakur MD at [facility] L&D       Family History   Problem Relation Age of Onset    Breast cancer Neg Hx     Colon cancer Neg Hx     Ovarian cancer Neg Hx        Social History     Socioeconomic History    Marital status: Single     Spouse name: Not on file    Number of children: Not on file    Years of education: Not on file    Highest education level: Not on file   Occupational History    Not on file   Social Needs    Financial resource strain: Not on file    Food insecurity:     Worry: Not on file     Inability: Not on file    Transportation needs:     Medical: Not on file     Non-medical: Not on file   Tobacco Use    Smoking status: Never Smoker    Smokeless tobacco: Never Used   Substance and Sexual Activity    Alcohol use: No    Drug use: No    Sexual activity: Yes     Partners: Male     Birth control/protection: None   Lifestyle    Physical activity:     Days per week: Not on file     Minutes per session: Not on file    Stress: Not on file   Relationships    Social connections:     Talks on phone: Not on file     Gets together: Not on file     Attends Religion service: Not on file     Active member of club or organization: Not on file     Attends meetings of clubs or organizations: Not on file     Relationship status: Not on file   Other Topics Concern    Not on file   Social History Narrative    Not on file           Allergies   Review of patient's allergies indicates:  No Known Allergies          Review of Systems  See HPI      Physical Exam  /82 (BP Location: Right arm, Patient Position: Sitting, BP Method: X-Large (Manual))   Pulse 70   Temp 98.3 °F (36.8 °C)   Ht 5' 4" (1.626 m)   Wt 93.9 kg (207 lb 0.2 oz)   SpO2 98%   BMI 35.53 kg/m²     GEN: NAD, well developed, pleasant, well nourished  HEENT: NCAT, PERRLA, EOMI, sclera clear, anicteric, O/P " clear, MMM with no lesions, no active bleeding in nares but she does have dry nasal mucosa  NECK: normal, supple with midline trachea, no LAD, no thyromegaly  LUNGS: CTAB, no w/r/r, no increased work of breathing   HEART: RRR, normal S1 and S2, no m/r/g, no edema  ABD: s/nt/nd, NABS  SKIN: normal turgor, no rashes  PSYCH: AOx3, appropriate mood and affect  MSK: warm/well perfused, normal ROM in all extremities, no c/c/e.  NEURO: normal without focal findings, CN II-XII are grossly intact.  Sensation/strength grossly normal, gait and station normal. Normal hearing test. No light sensitivity. Reflexes 2+ for biceps, brachial, patellar and achilles bilateral and symmetric. Finger to nose and cerebellar exam normal, no tremors

## 2019-08-14 ENCOUNTER — LAB VISIT (OUTPATIENT)
Dept: LAB | Facility: HOSPITAL | Age: 39
End: 2019-08-14
Attending: FAMILY MEDICINE
Payer: COMMERCIAL

## 2019-08-14 DIAGNOSIS — D64.89 ANEMIA DUE TO OTHER CAUSE, NOT CLASSIFIED: ICD-10-CM

## 2019-08-14 LAB
FERRITIN SERPL-MCNC: 50 NG/ML (ref 20–300)
IRON SERPL-MCNC: 64 UG/DL (ref 30–160)
SATURATED IRON: 19 % (ref 20–50)
TOTAL IRON BINDING CAPACITY: 336 UG/DL (ref 250–450)
TRANSFERRIN SERPL-MCNC: 227 MG/DL (ref 200–375)

## 2019-08-14 PROCEDURE — 83540 ASSAY OF IRON: CPT

## 2019-08-14 PROCEDURE — 82728 ASSAY OF FERRITIN: CPT

## 2019-08-14 PROCEDURE — 36415 COLL VENOUS BLD VENIPUNCTURE: CPT | Mod: PO

## 2019-08-15 ENCOUNTER — OFFICE VISIT (OUTPATIENT)
Dept: FAMILY MEDICINE | Facility: CLINIC | Age: 39
End: 2019-08-15
Payer: COMMERCIAL

## 2019-08-15 VITALS
SYSTOLIC BLOOD PRESSURE: 116 MMHG | TEMPERATURE: 98 F | HEIGHT: 64 IN | HEART RATE: 87 BPM | DIASTOLIC BLOOD PRESSURE: 80 MMHG | WEIGHT: 205 LBS | BODY MASS INDEX: 35 KG/M2 | OXYGEN SATURATION: 98 %

## 2019-08-15 DIAGNOSIS — Z00.00 ANNUAL PHYSICAL EXAM: Primary | ICD-10-CM

## 2019-08-15 DIAGNOSIS — E66.9 OBESITY (BMI 30-39.9): ICD-10-CM

## 2019-08-15 DIAGNOSIS — R51.9 NONINTRACTABLE HEADACHE, UNSPECIFIED CHRONICITY PATTERN, UNSPECIFIED HEADACHE TYPE: ICD-10-CM

## 2019-08-15 DIAGNOSIS — Z12.4 CERVICAL CANCER SCREENING: ICD-10-CM

## 2019-08-15 DIAGNOSIS — R04.0 EPISTAXIS: ICD-10-CM

## 2019-08-15 DIAGNOSIS — D64.9 ANEMIA, UNSPECIFIED TYPE: ICD-10-CM

## 2019-08-15 PROCEDURE — 99999 PR PBB SHADOW E&M-EST. PATIENT-LVL III: CPT | Mod: PBBFAC,,, | Performed by: FAMILY MEDICINE

## 2019-08-15 PROCEDURE — 99999 PR PBB SHADOW E&M-EST. PATIENT-LVL III: ICD-10-PCS | Mod: PBBFAC,,, | Performed by: FAMILY MEDICINE

## 2019-08-15 PROCEDURE — 99395 PR PREVENTIVE VISIT,EST,18-39: ICD-10-PCS | Mod: S$GLB,,, | Performed by: FAMILY MEDICINE

## 2019-08-15 PROCEDURE — 99395 PREV VISIT EST AGE 18-39: CPT | Mod: S$GLB,,, | Performed by: FAMILY MEDICINE

## 2019-08-15 NOTE — PROGRESS NOTES
Office Visit    Patient Name: Crys Peña    : 1980  MRN: [id number]      Assessment/Plan:  Crsy Peña is a 39 y.o. female who presents today for :    Annual physical exam  -     Hemoglobin A1c; Future; Expected date: 08/15/2019  -     CBC Without Differential; Future; Expected date: 08/15/2019  -     Comprehensive metabolic panel; Future; Expected date: 08/15/2019  -     Lipid panel; Future; Expected date: 08/15/2019  -     TSH; Future; Expected date: 08/15/2019  -     Vitamin D; Future; Expected date: 08/15/2019  Obesity (BMI 30-39.9)  -anticipatory guidance provided with age appropriate preventative services discussed, healthy diet and regular physical exercise also discussed with patient  -any additional health maintenance will be readdressed at the next physical if declined or deferred by the patient today   -d/w pt about the importance of portion control  -Recommend 15-30 minutes of moderate intensity exercise 5 days/week.    Anemia, unspecified type  -labs reviewed, wnl  -iron rich diet    Cervical cancer screening  -     Ambulatory referral to Gynecology    Nonintractable headache, unspecified chronicity pattern, unspecified headache type - resolved  Epistaxis - resolved            Follow up in about 1 year (around 8/15/2020).     This note was created by combination of typed  and Dragon dictation.  Transcription errors may be present.  If there are any questions, please contact me.        ----------------------------------------------------------------------------------------------------------------------      HPI:  Patient Care Team:  Shaun Mleara MD as PCP - General (Family Medicine)    Crys is a 39 y.o. female with      Patient Active Problem List   Diagnosis    Anemia    Obesity (BMI 30-39.9)       Patient presents today for:  Annual Exam        Patient is doing well and has no major complaints today - she was recently seen for a HA and nosebleed, which has since  resolved and has not recurred since last visit. She is due for Pap in about 6 months. Pt is trying to eat a healthier diet to try to lose weight. Pt does not engage in physical exercises regularly. Patient denies any cardiovascular or neurologic complaints today        Additional ROS  No F/C/wt changes/fatigue  No dysphagia/sore throat/rhinorrhea  No CP/IRIZARRY/palpitations/swelling  No cough/wheezing/SOB  No nausea/vomiting/abd pain/no diarrhea, no constipation, no blood in stool  No muscle aches/joint pain   No rashes  No MSK weakness/HA/tingling/numbness  No anxiety/depression  No dysuria/hematuria  No polyuria/polydipsia/fatigue/cold or hot intolerance          Current Medications  Medications reviewed and updated.     No current outpatient medications on file.    Past Surgical History:   Procedure Laterality Date    DILATION AND CURETTAGE OF UTERUS      miscarriage    LIGATION-TUBAL-POST PARTUM Bilateral 9/1/2017    Performed by Quinton Thakur MD at [facility] L&D       Family History   Problem Relation Age of Onset    Breast cancer Neg Hx     Colon cancer Neg Hx     Ovarian cancer Neg Hx        Social History     Socioeconomic History    Marital status: Single     Spouse name: Not on file    Number of children: Not on file    Years of education: Not on file    Highest education level: Not on file   Occupational History    Not on file   Social Needs    Financial resource strain: Not on file    Food insecurity:     Worry: Not on file     Inability: Not on file    Transportation needs:     Medical: Not on file     Non-medical: Not on file   Tobacco Use    Smoking status: Never Smoker    Smokeless tobacco: Never Used   Substance and Sexual Activity    Alcohol use: No    Drug use: No    Sexual activity: Yes     Partners: Male     Birth control/protection: None   Lifestyle    Physical activity:     Days per week: Not on file     Minutes per session: Not on file    Stress: Not on file   Relationships     "Social connections:     Talks on phone: Not on file     Gets together: Not on file     Attends Sikh service: Not on file     Active member of club or organization: Not on file     Attends meetings of clubs or organizations: Not on file     Relationship status: Not on file   Other Topics Concern    Not on file   Social History Narrative    Not on file           Allergies   Review of patient's allergies indicates:  No Known Allergies          Review of Systems  See HPI      Physical Exam  /80   Pulse 87   Temp 98.3 °F (36.8 °C) (Oral)   Ht 5' 4" (1.626 m)   Wt 93 kg (205 lb 0.4 oz)   SpO2 98%   BMI 35.19 kg/m²     GEN: NAD, well developed, pleasant, well nourished  HEENT: NCAT, PERRLA, EOMI, sclera clear, anicteric, bilateral ear exam wnl, O/P clear, MMM with no lesions  NECK: normal, supple with midline trachea, no LAD, no thyromegaly  LUNGS: CTAB, no w/r/r, no increased work of breathing   HEART: RRR, normal S1 and S2, no m/r/g, no edema  ABD: s/nt/nd, NABS  SKIN: normal turgor, no rashes  PSYCH: AOx3, appropriate mood and affect  MSK: warm/well perfused, normal ROM in all extremities, no c/c/e.            "

## 2020-01-28 DIAGNOSIS — M79.671 RIGHT FOOT PAIN: Primary | ICD-10-CM

## 2020-01-29 ENCOUNTER — OFFICE VISIT (OUTPATIENT)
Dept: OBSTETRICS AND GYNECOLOGY | Facility: CLINIC | Age: 40
End: 2020-01-29
Payer: COMMERCIAL

## 2020-01-29 VITALS
HEIGHT: 64 IN | WEIGHT: 196.19 LBS | BODY MASS INDEX: 33.49 KG/M2 | DIASTOLIC BLOOD PRESSURE: 84 MMHG | SYSTOLIC BLOOD PRESSURE: 120 MMHG

## 2020-01-29 DIAGNOSIS — Z12.4 ENCOUNTER FOR PAPANICOLAOU SMEAR FOR CERVICAL CANCER SCREENING: ICD-10-CM

## 2020-01-29 DIAGNOSIS — Z01.419 WELL WOMAN EXAM WITH ROUTINE GYNECOLOGICAL EXAM: Primary | ICD-10-CM

## 2020-01-29 PROCEDURE — 88175 CYTOPATH C/V AUTO FLUID REDO: CPT

## 2020-01-29 PROCEDURE — 99999 PR PBB SHADOW E&M-EST. PATIENT-LVL III: CPT | Mod: PBBFAC,,, | Performed by: OBSTETRICS & GYNECOLOGY

## 2020-01-29 PROCEDURE — 99395 PR PREVENTIVE VISIT,EST,18-39: ICD-10-PCS | Mod: S$GLB,,, | Performed by: OBSTETRICS & GYNECOLOGY

## 2020-01-29 PROCEDURE — 99999 PR PBB SHADOW E&M-EST. PATIENT-LVL III: ICD-10-PCS | Mod: PBBFAC,,, | Performed by: OBSTETRICS & GYNECOLOGY

## 2020-01-29 PROCEDURE — 87624 HPV HI-RISK TYP POOLED RSLT: CPT

## 2020-01-29 PROCEDURE — 99395 PREV VISIT EST AGE 18-39: CPT | Mod: S$GLB,,, | Performed by: OBSTETRICS & GYNECOLOGY

## 2020-01-29 NOTE — PROGRESS NOTES
SUBJECTIVE:   Crys Peña is a 39 y.o. female   for annual well woman exam. Patient's last menstrual period was 2020 (exact date)..  She has no unusual complaints.      Contraception: btl    Past Medical History:   Diagnosis Date    Pregnancy      Past Surgical History:   Procedure Laterality Date    DILATION AND CURETTAGE OF UTERUS      miscarriage     Social History     Socioeconomic History    Marital status: Single     Spouse name: Not on file    Number of children: Not on file    Years of education: Not on file    Highest education level: Not on file   Occupational History    Not on file   Social Needs    Financial resource strain: Not on file    Food insecurity:     Worry: Not on file     Inability: Not on file    Transportation needs:     Medical: Not on file     Non-medical: Not on file   Tobacco Use    Smoking status: Never Smoker    Smokeless tobacco: Never Used   Substance and Sexual Activity    Alcohol use: No    Drug use: No    Sexual activity: Yes     Partners: Male     Birth control/protection: See Surgical Hx     Comment: BTL    Lifestyle    Physical activity:     Days per week: Not on file     Minutes per session: Not on file    Stress: Not on file   Relationships    Social connections:     Talks on phone: Not on file     Gets together: Not on file     Attends Buddhism service: Not on file     Active member of club or organization: Not on file     Attends meetings of clubs or organizations: Not on file     Relationship status: Not on file   Other Topics Concern    Not on file   Social History Narrative    Not on file     Family History   Problem Relation Age of Onset    Breast cancer Neg Hx     Colon cancer Neg Hx     Ovarian cancer Neg Hx      OB History    Para Term  AB Living   6 5 5   1 4   SAB TAB Ectopic Multiple Live Births   1     0 4      # Outcome Date GA Lbr Hernan/2nd Weight Sex Delivery Anes PTL Lv   6 Term 17 38w5d / 01:25  "2.76 kg (6 lb 1.4 oz) F Vag-Spont EPI N    5 SAB 2016           4 Term 06/19/12 40w0d  3.175 kg (7 lb) F Vag-Spont EPI N RUPERTO   3 Term 12/01/04 40w0d  3.175 kg (7 lb) M Vag-Spont EPI N RUPERTO   2 Term 05/26/02 37w0d  2.722 kg (6 lb) F Vag-Spont EPI N RUPERTO   1 Term 08/10/01 42w0d  3.175 kg (7 lb) F Vag-Spont EPI N RUPERTO         No current outpatient medications on file.     No current facility-administered medications for this visit.      Allergies: Patient has no known allergies.       ROS:  GENERAL: Denies weight gain or weight loss. Feeling well overall.   SKIN: Denies rash or lesions.   HEAD: Denies head injury or headache.   NODES: Denies enlarged lymph nodes.   CHEST: Denies chest pain or shortness of breath.   CARDIOVASCULAR: Denies palpitations or left sided chest pain.   ABDOMEN: No abdominal pain, constipation, diarrhea, nausea, vomiting or rectal bleeding.   URINARY: No frequency, dysuria, hematuria, or burning on urination.  REPRODUCTIVE: Denies vaginal discharge, abnormal vaginal bleeding, lesions, pelvic pain  BREASTS: The patient performs breast self-examination and denies pain, lumps, or nipple discharge.   HEMATOLOGIC: No easy bruisability or excessive bleeding.  MUSCULOSKELETAL: Denies joint pain or swelling.   NEUROLOGIC: Denies syncope or weakness.   PSYCHIATRIC: Denies depression, anxiety or mood swings.      OBJECTIVE:   /84 (BP Location: Left arm, Patient Position: Sitting, BP Method: Large (Manual))   Ht 5' 4" (1.626 m)   Wt 89 kg (196 lb 3.4 oz)   LMP 01/25/2020 (Exact Date)   BMI 33.68 kg/m²   The patient appears well, alert, oriented x 3, in no distress.  PSYCH:  Normal, full range of affect  NECK: negative, no thyromegaly, trachea midline  SKIN: normal, good color, good turgor and no acne, striae, hirsutism  BREAST EXAM: breasts appear normal, no suspicious masses, no skin or nipple changes or axillary nodes  ABDOMEN: soft, non-tender; bowel sounds normal; no masses,  no organomegaly " and no hernias, masses, or hepatosplenomegaly  GENITALIA: normal external genitalia, no erythema, no discharge  BLADDER: soft  URETHRA: normal appearing urethra with no masses, tenderness or lesions and normal urethra, normal urethral meatus  VAGINA: dark blood mixed with vaginal discharge  CERVIX: no lesions or cervical motion tenderness  UTERUS: normal size, contour, position, consistency, mobility, non-tender  ADNEXA: no mass, fullness, tenderness      ASSESSMENT:   1. Health maintenance  -pap done. Discussed ASCCP guideline screening every 3 - 5 years.   -counseled on exercise and healthy diet, weight loss  -bone health:  Discussed Vitamin D and Calcium supplementation, weight bearing exercises  2.  Discussed safety at home/school/work, healthy and balanced diet, sleep hygiene, as well as violence/weapons exposure.   3.  rtc yearly for wwe

## 2020-01-30 ENCOUNTER — OFFICE VISIT (OUTPATIENT)
Dept: ORTHOPEDICS | Facility: CLINIC | Age: 40
End: 2020-01-30
Attending: ORTHOPAEDIC SURGERY
Payer: COMMERCIAL

## 2020-01-30 VITALS
WEIGHT: 195.75 LBS | HEART RATE: 69 BPM | HEIGHT: 64 IN | OXYGEN SATURATION: 99 % | BODY MASS INDEX: 33.42 KG/M2 | SYSTOLIC BLOOD PRESSURE: 130 MMHG | RESPIRATION RATE: 17 BRPM | DIASTOLIC BLOOD PRESSURE: 90 MMHG

## 2020-01-30 DIAGNOSIS — M72.2 PLANTAR FASCIITIS OF RIGHT FOOT: ICD-10-CM

## 2020-01-30 PROCEDURE — 99204 PR OFFICE/OUTPT VISIT, NEW, LEVL IV, 45-59 MIN: ICD-10-PCS | Mod: S$GLB,,, | Performed by: ORTHOPAEDIC SURGERY

## 2020-01-30 PROCEDURE — 99999 PR PBB SHADOW E&M-EST. PATIENT-LVL III: CPT | Mod: PBBFAC,,, | Performed by: ORTHOPAEDIC SURGERY

## 2020-01-30 PROCEDURE — 99999 PR PBB SHADOW E&M-EST. PATIENT-LVL III: ICD-10-PCS | Mod: PBBFAC,,, | Performed by: ORTHOPAEDIC SURGERY

## 2020-01-30 PROCEDURE — 99204 OFFICE O/P NEW MOD 45 MIN: CPT | Mod: S$GLB,,, | Performed by: ORTHOPAEDIC SURGERY

## 2020-01-30 RX ORDER — DICLOFENAC SODIUM 50 MG/1
50 TABLET, DELAYED RELEASE ORAL 2 TIMES DAILY
Qty: 60 TABLET | Refills: 0 | Status: SHIPPED | OUTPATIENT
Start: 2020-01-30 | End: 2020-09-16

## 2020-01-30 NOTE — PROGRESS NOTES
"Chief Complaint   Patient presents with    Right Foot - Pain       This patient was seen in consultation at the request of Dr. Kandi Rojas     Initial visit (01/30/2020): Crys Peña is a 39 y.o. female who presents today complaining of Pain of the Right Foot     Duration of symptoms:  Several months  Trauma or new activity: no  Pain is intermittent  Localized over plantar heel   Aggravating factors:  Worse first step out of bed, when she has been standing for long periods. Worse if she stands after sitting for a while   Relieving factors: massage  Radicular symptoms: no numbness, paresthesias   Associated symptoms:  none.    Prior treatment:  None      Pain does not interfere with duties at work and activities of daily living .    Wears [employer]ko-style shoes to work - have a firm bottom.     This is the extent of the patient's complaints at this time.      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        Review of patient's allergies indicates:  No Known Allergies    No current outpatient medications on file.    Past Medical History:   Diagnosis Date    Pregnancy        Patient Active Problem List   Diagnosis    Anemia    Obesity (BMI 30-39.9)       Past Surgical History:   Procedure Laterality Date    DILATION AND CURETTAGE OF UTERUS      miscarriage       Social History     Tobacco Use    Smoking status: Never Smoker    Smokeless tobacco: Never Used   Substance Use Topics    Alcohol use: No    Drug use: No       Family History   Problem Relation Age of Onset    Breast cancer Neg Hx     Colon cancer Neg Hx     Ovarian cancer Neg Hx        Physical Exam:   Vitals:    01/30/20 0853   BP: (!) 130/90   Pulse: 69   Resp: 17   SpO2: 99%   Weight: 88.8 kg (195 lb 12.3 oz)   Height: 5' 4" (1.626 m)   PainSc: 0-No pain   PainLoc: Foot       General: Weight: 88.8 kg (195 lb 12.3 oz) Body mass index " is 33.6 kg/m². Patient is alert, awake and oriented to time, place and person. Mood and affect are appropriate.  Patient does not appear to be in any distress, denies any constitutional symptoms and appears stated age.   HEENT:  Pupils are equal and round, sclera are not injected. External examination of ears and nose reveals no abnormalities. Cranial nerves II-X are grossly intact  Skin:  no rashes, abrasions or open wounds on the affected extremity   Resp:  No respiratory distress or audible wheezing   CV: 2+  pulses, all extremities warm and well perfused \  Right Foot    Tender over PF insertion  DF to 5 degrees  Non tender midfoot forefoot  Ltsi s/s/sp/dp/t  + ehl/fhl/ta/gs  2+ DP    Imaging: 3 views right  foot: negative for degenerative changes. No spurs. No fracture.     I personally reviewed and interpreted the patient's imaging obtained today in clinic     Assessment: 39 y.o. female with right plantar fasciitis     I explained my diagnostic impression and the reasoning behind it in detail, using layman's terms.  Models and/or pictures were used to help in the explanation.  We discussed non operative and operative treatment modalities -- She would like to start with non-operative treatment.     Plan:   - Night splint  - pressure relief insert  - diclofenac 50 mg BID x 2 weeks then PRN. Take with food. The patient was advised that NSAID-type medications have two very important potential side effects: gastrointestinal irritation including hemorrhage and renal injuries. Instructions were given to take the medication with food and to stop for any GI upset. Call for vomiting, abdominal pain or black/bloody stools. The patient expresses understanding of these issues and questions were answered.  - Stretches  - if no improvement can consider pain cream, CAM boot, PT  - Return to clinic in 3 months. Return sooner if symptoms worsen or fail to improve.    All questions were answered in detail. The patient is in full  agreement with the treatment plan and will proceed accordingly.    A note notifying Dr. Kandi Rojas of my findings was sent via the electronic medical record     This note was created by combination of typed  and M-Modal dictation. Transcription and phonetic errors may be present.  If there are any questions, please contact me.

## 2020-01-30 NOTE — PATIENT INSTRUCTIONS
"New Balance Pressure Relief Insert   Anti-inflammatories twice a day for two weeks then as needed   Night splint - can buy on amazon. Look for one with hard back. Gallo "Plantar Fasciitis Night Splint"    ACHILLES STRETCHING PROGRAM    The stretch is called an eccentric step stretch.    Find a suitable step where you can stand upright (you should have no bend at your waist) with heels over the edge of the step (you should be on the balls of your feet) and lower yourself down over the step (you are allowed to use your hands to balance).    Each stretch should last 15 to 20 seconds. Tightness should be felt in calf not Achilles tendon. As a general rule I would encourage you to stretch irrespective of the pain that occurs DURING the stretch.    Number of Stretches    You do 6 stretches with knees straight and then 3 stretches with knees slightly bent.    This is 1 set. Weight will either be on both legs (Figures A and B) or on one leg (Figures C and D).        Program    Level 1: 1 Set with weight on both legs once per day    Level 2: 1 Set with weight on both legs twice per day    Level 3: 1 Set with weight on both legs three times per day    Level 4: 1 Set with weight on one leg (the affected leg) once per day    Level 5: 1 Set with weight on one leg twice per day    Level 6: 1 Set with weight on one leg three times per day    How to progress to the next level    You should not progress to the next level unless     You have done three days in that level     You have not had any increase in morning after pain or stiffness      You have not had increase in pain measured two hours after completing the last repetition for that day.    When to rest or stop?    If you are unable to complete the level's 6 stretches, stop the program rest for 2 days and then resume at Level 1     How long do I do the program for?    6 weeks and then you return to running irrespective whether you consider whether you have improved or " not.    What results can you expect?  This program is based on a study done on [place] patients.  It showed this stretching program was successful at alleviating pain and getting patients back to their preferred activity in 86% of cases.  It took an average of 10 weeks to get back to activity level and often symptoms took 6 months to resolve.  Only 1 patient ever required surgery.  This works better when symptoms are in middle of the tendon instead of where it attaches to bone.  Even when symptoms are at the bone, it was still effective in 50% of cases.

## 2020-02-04 LAB
HPV HR 12 DNA SPEC QL NAA+PROBE: NEGATIVE
HPV16 AG SPEC QL: NEGATIVE
HPV18 DNA SPEC QL NAA+PROBE: NEGATIVE

## 2020-02-21 LAB
FINAL PATHOLOGIC DIAGNOSIS: NORMAL
Lab: NORMAL

## 2020-04-21 ENCOUNTER — PATIENT MESSAGE (OUTPATIENT)
Dept: ORTHOPEDICS | Facility: CLINIC | Age: 40
End: 2020-04-21

## 2020-08-14 DIAGNOSIS — Z11.59 NEED FOR HEPATITIS C SCREENING TEST: ICD-10-CM

## 2020-09-15 ENCOUNTER — HOSPITAL ENCOUNTER (EMERGENCY)
Facility: HOSPITAL | Age: 40
Discharge: HOME | End: 2020-09-15
Attending: EMERGENCY MEDICINE
Payer: COMMERCIAL

## 2020-09-15 VITALS
TEMPERATURE: 99 F | BODY MASS INDEX: 33.13 KG/M2 | SYSTOLIC BLOOD PRESSURE: 135 MMHG | HEIGHT: 62 IN | RESPIRATION RATE: 20 BRPM | HEART RATE: 75 BPM | DIASTOLIC BLOOD PRESSURE: 75 MMHG | WEIGHT: 180 LBS | OXYGEN SATURATION: 100 %

## 2020-09-15 DIAGNOSIS — E87.6 HYPOKALEMIA: ICD-10-CM

## 2020-09-15 DIAGNOSIS — R07.9 RIGHT-SIDED CHEST PAIN: Primary | ICD-10-CM

## 2020-09-15 LAB
ALBUMIN SERPL BCP-MCNC: 3.8 G/DL (ref 3.5–5.2)
ALP SERPL-CCNC: 50 U/L (ref 55–135)
ALT SERPL W/O P-5'-P-CCNC: 13 U/L (ref 10–44)
ANION GAP SERPL CALC-SCNC: 7 MMOL/L (ref 8–16)
AST SERPL-CCNC: 18 U/L (ref 10–40)
B-HCG UR QL: NEGATIVE
BASOPHILS # BLD AUTO: 0.05 K/UL (ref 0–0.2)
BASOPHILS NFR BLD: 0.5 % (ref 0–1.9)
BILIRUB SERPL-MCNC: 0.2 MG/DL (ref 0.1–1)
BNP SERPL-MCNC: 16 PG/ML (ref 0–99)
BUN SERPL-MCNC: 13 MG/DL (ref 6–20)
CALCIUM SERPL-MCNC: 9.3 MG/DL (ref 8.7–10.5)
CHLORIDE SERPL-SCNC: 104 MMOL/L (ref 95–110)
CO2 SERPL-SCNC: 28 MMOL/L (ref 23–29)
CREAT SERPL-MCNC: 0.9 MG/DL (ref 0.5–1.4)
CTP QC/QA: YES
D DIMER PPP IA.FEU-MCNC: 0.28 MG/L FEU
DIFFERENTIAL METHOD: ABNORMAL
EOSINOPHIL # BLD AUTO: 0.1 K/UL (ref 0–0.5)
EOSINOPHIL NFR BLD: 0.9 % (ref 0–8)
ERYTHROCYTE [DISTWIDTH] IN BLOOD BY AUTOMATED COUNT: 13.7 % (ref 11.5–14.5)
EST. GFR  (AFRICAN AMERICAN): >60 ML/MIN/1.73 M^2
EST. GFR  (NON AFRICAN AMERICAN): >60 ML/MIN/1.73 M^2
GLUCOSE SERPL-MCNC: 111 MG/DL (ref 70–110)
HCT VFR BLD AUTO: 37.1 % (ref 37–48.5)
HGB BLD-MCNC: 11.7 G/DL (ref 12–16)
IMM GRANULOCYTES # BLD AUTO: 0.02 K/UL (ref 0–0.04)
IMM GRANULOCYTES NFR BLD AUTO: 0.2 % (ref 0–0.5)
LIPASE SERPL-CCNC: 52 U/L (ref 4–60)
LYMPHOCYTES # BLD AUTO: 2.4 K/UL (ref 1–4.8)
LYMPHOCYTES NFR BLD: 22.8 % (ref 18–48)
MCH RBC QN AUTO: 28.1 PG (ref 27–31)
MCHC RBC AUTO-ENTMCNC: 31.5 G/DL (ref 32–36)
MCV RBC AUTO: 89 FL (ref 82–98)
MONOCYTES # BLD AUTO: 0.6 K/UL (ref 0.3–1)
MONOCYTES NFR BLD: 6 % (ref 4–15)
NEUTROPHILS # BLD AUTO: 7.4 K/UL (ref 1.8–7.7)
NEUTROPHILS NFR BLD: 69.6 % (ref 38–73)
NRBC BLD-RTO: 0 /100 WBC
PLATELET # BLD AUTO: 335 K/UL (ref 150–350)
PMV BLD AUTO: 9.8 FL (ref 9.2–12.9)
POTASSIUM SERPL-SCNC: 3.3 MMOL/L (ref 3.5–5.1)
PROT SERPL-MCNC: 7.8 G/DL (ref 6–8.4)
RBC # BLD AUTO: 4.17 M/UL (ref 4–5.4)
SODIUM SERPL-SCNC: 139 MMOL/L (ref 136–145)
TROPONIN I SERPL DL<=0.01 NG/ML-MCNC: <0.006 NG/ML (ref 0–0.03)
WBC # BLD AUTO: 10.58 K/UL (ref 3.9–12.7)

## 2020-09-15 PROCEDURE — 93010 ELECTROCARDIOGRAM REPORT: CPT | Mod: ,,, | Performed by: INTERNAL MEDICINE

## 2020-09-15 PROCEDURE — 25000003 PHARM REV CODE 250: Performed by: NURSE PRACTITIONER

## 2020-09-15 PROCEDURE — 81025 URINE PREGNANCY TEST: CPT | Performed by: NURSE PRACTITIONER

## 2020-09-15 PROCEDURE — 83690 ASSAY OF LIPASE: CPT

## 2020-09-15 PROCEDURE — 84484 ASSAY OF TROPONIN QUANT: CPT

## 2020-09-15 PROCEDURE — 93010 EKG 12-LEAD: ICD-10-PCS | Mod: ,,, | Performed by: INTERNAL MEDICINE

## 2020-09-15 PROCEDURE — 93005 ELECTROCARDIOGRAM TRACING: CPT

## 2020-09-15 PROCEDURE — 85025 COMPLETE CBC W/AUTO DIFF WBC: CPT

## 2020-09-15 PROCEDURE — 96374 THER/PROPH/DIAG INJ IV PUSH: CPT

## 2020-09-15 PROCEDURE — 85379 FIBRIN DEGRADATION QUANT: CPT

## 2020-09-15 PROCEDURE — 99284 EMERGENCY DEPT VISIT MOD MDM: CPT | Mod: 25

## 2020-09-15 PROCEDURE — 83880 ASSAY OF NATRIURETIC PEPTIDE: CPT

## 2020-09-15 PROCEDURE — 63600175 PHARM REV CODE 636 W HCPCS: Performed by: NURSE PRACTITIONER

## 2020-09-15 PROCEDURE — 80053 COMPREHEN METABOLIC PANEL: CPT

## 2020-09-15 RX ORDER — POTASSIUM CHLORIDE 20 MEQ/1
40 TABLET, EXTENDED RELEASE ORAL
Status: COMPLETED | OUTPATIENT
Start: 2020-09-15 | End: 2020-09-15

## 2020-09-15 RX ORDER — KETOROLAC TROMETHAMINE 30 MG/ML
15 INJECTION, SOLUTION INTRAMUSCULAR; INTRAVENOUS
Status: COMPLETED | OUTPATIENT
Start: 2020-09-15 | End: 2020-09-15

## 2020-09-15 RX ADMIN — SODIUM CHLORIDE 1000 ML: 0.9 INJECTION, SOLUTION INTRAVENOUS at 12:09

## 2020-09-15 RX ADMIN — POTASSIUM CHLORIDE 40 MEQ: 1500 TABLET, EXTENDED RELEASE ORAL at 02:09

## 2020-09-15 RX ADMIN — KETOROLAC TROMETHAMINE 15 MG: 30 INJECTION, SOLUTION INTRAMUSCULAR at 02:09

## 2020-09-15 NOTE — ED PROVIDER NOTES
Encounter Date: 9/15/2020    SCRIBE #1 NOTE: I, Rigo Patterson, am scribing for, and in the presence of,  Heri Shankar NP. I have scribed the following portions of the note - Other sections scribed: HPI, ROS, PE.       History     Chief Complaint   Patient presents with    Chest Pain     pt complains of midsternal chest pain that started at about 8:30am. Also complains of diarrhea and nausea. Denies SOB    Abdominal Pain     upper quad abd pain that started at 8:30am     CC: Chest pain    HPI: This is a 40 y.o. F who has no PMHx who presents to the ED for emergent evaluation of acute and intermittent right sided CP that radiates to the back that began at 8:30 am (4 hours ago). The CP occurred as she walking into work. Pt's CP is exacerbated with certain movement. She states that she had associated epigastric abdominal pain, nausea, and one episode of loose stool during onset of the CP, which has spontaneously resolved. She described the abdominal pain as a cramping sensation. The CP and back pain persist. Pt denies fever, cough, SOB, vomiting, ill contact at home or work, or FHx of heart disease.    The history is provided by the patient. No  was used.     Review of patient's allergies indicates:  No Known Allergies  Past Medical History:   Diagnosis Date    Pregnancy      Past Surgical History:   Procedure Laterality Date    DILATION AND CURETTAGE OF UTERUS      miscarriage     Family History   Problem Relation Age of Onset    Breast cancer Neg Hx     Colon cancer Neg Hx     Ovarian cancer Neg Hx      Social History     Tobacco Use    Smoking status: Never Smoker    Smokeless tobacco: Never Used   Substance Use Topics    Alcohol use: No     Frequency: Monthly or less     Drinks per session: 1 or 2     Binge frequency: Never    Drug use: No     Review of Systems   Constitutional: Negative for chills and fever.   HENT: Negative for sore throat.    Respiratory: Negative for cough and  shortness of breath.    Cardiovascular: Positive for chest pain.   Gastrointestinal: Positive for abdominal pain (resolved) and nausea (resolved). Negative for vomiting.   Genitourinary: Negative for dysuria.   Musculoskeletal: Positive for back pain. Negative for myalgias.   Skin: Negative for rash.   Neurological: Negative for weakness.   Hematological: Does not bruise/bleed easily.       Physical Exam     Initial Vitals [09/15/20 1209]   BP Pulse Resp Temp SpO2   132/69 85 16 98.5 °F (36.9 °C) 99 %      MAP       --         Physical Exam    Nursing note and vitals reviewed.  Constitutional: She appears well-developed and well-nourished. She is not diaphoretic. No distress.   HENT:   Head: Normocephalic and atraumatic.   Right Ear: External ear normal.   Left Ear: External ear normal.   Nose: Nose normal.   Mouth/Throat: Oropharynx is clear and moist.   Eyes: Conjunctivae and EOM are normal. Pupils are equal, round, and reactive to light.   Neck: Normal range of motion. Neck supple.   Cardiovascular: Normal rate, regular rhythm and normal heart sounds.   Pulmonary/Chest: Breath sounds normal. No stridor. No respiratory distress.   There is no chest wall tenderness with palpation, but chest pain is worse with certain movememt of her shoulder.   Abdominal: Soft. Bowel sounds are normal. There is no abdominal tenderness.   Musculoskeletal: Normal range of motion. No edema.   Neurological: She is alert and oriented to person, place, and time.   Skin: Skin is warm and dry. No rash noted. No pallor.   Psychiatric: She has a normal mood and affect.         ED Course   Procedures  Labs Reviewed   CBC W/ AUTO DIFFERENTIAL - Abnormal; Notable for the following components:       Result Value    Hemoglobin 11.7 (*)     Mean Corpuscular Hemoglobin Conc 31.5 (*)     All other components within normal limits   COMPREHENSIVE METABOLIC PANEL - Abnormal; Notable for the following components:    Potassium 3.3 (*)     Glucose 111 (*)      Alkaline Phosphatase 50 (*)     Anion Gap 7 (*)     All other components within normal limits   TROPONIN I   D DIMER, QUANTITATIVE   B-TYPE NATRIURETIC PEPTIDE   LIPASE   POCT URINE PREGNANCY     EKG Readings: (Independently Interpreted)   Initial Reading: No STEMI. Rhythm: Normal Sinus Rhythm. Heart Rate: 84. Ectopy: No Ectopy. Conduction: Normal. ST Segments: Normal ST Segments. T Waves: Normal. Clinical Impression: Normal Sinus Rhythm     ECG Results          EKG 12-lead (In process)  Result time 09/15/20 13:33:40    In process by Interface, Lab In [facility] (09/15/20 13:33:40)                 Narrative:    Test Reason : R07.9,    Vent. Rate : 084 BPM     Atrial Rate : 084 BPM     P-R Int : 162 ms          QRS Dur : 090 ms      QT Int : 372 ms       P-R-T Axes : 062 035 035 degrees     QTc Int : 439 ms    Normal sinus rhythm  Normal ECG  No previous ECGs available    Referred By: [email or web address]   SELF           Confirmed By:                   In process by Interface, Lab In [facility] (09/15/20 13:29:09)                 Narrative:    Test Reason : R07.9,    Vent. Rate : 084 BPM     Atrial Rate : 084 BPM     P-R Int : 162 ms          QRS Dur : 090 ms      QT Int : 372 ms       P-R-T Axes : 062 035 035 degrees     QTc Int : 439 ms    Normal sinus rhythm  Normal ECG  No previous ECGs available    Referred By: [email or web address]   SELF           Confirmed By:                             Imaging Results          X-Ray Chest AP Portable (Final result)  Result time 09/15/20 13:24:24    Final result by Shemar Davidson MD (09/15/20 13:24:24)                 Impression:      1. No acute radiographic findings in the chest on this single view.      Electronically signed by: Shemar Davidson MD  Date:    09/15/2020  Time:    13:24             Narrative:    EXAMINATION:  XR CHEST AP PORTABLE    CLINICAL HISTORY:  Chest pain, unspecified    TECHNIQUE:  Single frontal view of the chest was  performed.    COMPARISON:  None.    FINDINGS:  Cardiac monitoring leads project over the bilateral hemithoraces.  Mediastinal structures are midline.  Hilar contours are unremarkable.  Cardiac silhouette is normal in size.  Lung volumes are low but symmetric.  No consolidation.  No pneumothorax or large pleural effusion.  No free air beneath the diaphragm.  There is mild dextroscoliosis of the thoracic spine.  No acute osseous abnormalities.  Visualized soft tissues are unremarkable.                                 Medical Decision Making:   History:   Old Medical Records: I decided to obtain old medical records.  Clinical Tests:   Lab Tests: Ordered and Reviewed  Radiological Study: Ordered and Reviewed  Medical Tests: Ordered and Reviewed  ED Management:  HPI and physical exam as above.    40 year old patient with hx of obesity and anemia presenting secondary to chest pain. Patient is at lower risk of ACS due to risk factors and HPI with a heart score of 1. EKG was reassuring and chest xray showed nothing acute.  Pain is worse with movement.  Most likely musculoskeletal cause of patient.     [email or web address]    Also considered but less likely:     PE: normal rate, no sob/recent immobilization/surgery/travel/family history. PERC negative.  D-dimer normal.  Pneumonia: chest xray negative. No fever. No cough and lungs non consistent with pna  Tamponade: unlikely due to chest xray and ekg  STEMI: No STEMI on ekg  Dissection: equal pulses bilaterally and no ripping chest pain to the back  Esophageal rupture: no dysphagia or vomiting and chest xray negative for mediastinal air  Arrhythmia: no arrhythmia on ekg  CHF: no fluid overload on Cxr and physical exam  Pneumothorax: bilateral breath sounds and no signs of pneumothorax on chest xray     Patient felt improved with interventions and has a lower risk of impending cardiac failure to the heart score of 1. Advised patient to follow  up with her PCP for re-evaluation and further management.  ED return precautions given. All questions regarding diagnosis and plan were answered to the patient's fullest possible satisfaction. Patient expressed understanding of diagnosis, discharge instructions, and return precautions.            Patient note was created using [employer] voice dictation software.  Any errors in syntax or information may not have been identified and edited prior to signing this note.      Additional MDM:   Heart Score:    History:          Slightly suspicious.  ECG:             Normal  Age:               Less than 45 years  Risk factors: 1-2 risk factors  Troponin:       Less than or equal to normal limit  Final Score: 1                               Clinical Impression:       ICD-10-CM ICD-9-CM   1. Right-sided chest pain  R07.9 786.50   2. Hypokalemia  E87.6 276.8         Disposition:   Disposition: Discharged  Condition: Stable     ED Disposition Condition    Discharge Stable        ED Prescriptions     None        Follow-up Information     Follow up With Specialties Details Why Contact Info    Shaun Melara MD Family Medicine Schedule an appointment as soon as possible for a visit in 3 days For further evaluation [address] [facility] [address]  [phone]      [facility] Emergency Medicine Go to  If symptoms worsen, As needed [address]  [facility] [address] [phone]                        Scribe Attestation: I, Heri Shankar NP, personally performed the services described in this documentation. All medical record entries made by the scribe were at my direction and in my presence.  I have reviewed the chart and agree that the record reflects my personal performance and is accurate and complete.               Heri Shankar NP  09/15/20 9380

## 2020-09-15 NOTE — DISCHARGE INSTRUCTIONS
Take medications as prescribed.  Ibuprofen and Tylenol for pain as needed.  Follow-up with your regular doctor.    Return to the emergency department immediately for any new or worsening symptoms.    Thank you for coming to our Emergency Department today. It is important to remember that some problems are difficult to diagnose and may not be found during your first visit. Be sure to follow up with your primary care doctor.  If you do not have one, you may contact the one listed on your discharge paperwork or you may also call the [facility] Appointment Desk at [phone] to schedule an appointment with one.     Return to the ER with any questions/concerns, new/concerning symptoms, worsening or failure to improve. Do not drive or make any important decisions for 24 hours if you have received any pain medications, sedatives or mood altering drugs during your ER visit.

## 2020-09-16 ENCOUNTER — OFFICE VISIT (OUTPATIENT)
Dept: FAMILY MEDICINE | Facility: CLINIC | Age: 40
End: 2020-09-16
Payer: COMMERCIAL

## 2020-09-16 VITALS
BODY MASS INDEX: 37.85 KG/M2 | OXYGEN SATURATION: 99 % | SYSTOLIC BLOOD PRESSURE: 112 MMHG | TEMPERATURE: 98 F | WEIGHT: 205.69 LBS | DIASTOLIC BLOOD PRESSURE: 72 MMHG | HEIGHT: 62 IN | HEART RATE: 65 BPM

## 2020-09-16 DIAGNOSIS — Z11.59 ENCOUNTER FOR HEPATITIS C SCREENING TEST FOR LOW RISK PATIENT: ICD-10-CM

## 2020-09-16 DIAGNOSIS — Z09 HOSPITAL DISCHARGE FOLLOW-UP: Primary | ICD-10-CM

## 2020-09-16 DIAGNOSIS — R07.9 CHEST PAIN, UNSPECIFIED TYPE: ICD-10-CM

## 2020-09-16 DIAGNOSIS — E87.6 HYPOKALEMIA: ICD-10-CM

## 2020-09-16 DIAGNOSIS — D64.9 ANEMIA, UNSPECIFIED TYPE: ICD-10-CM

## 2020-09-16 DIAGNOSIS — Z00.00 ANNUAL PHYSICAL EXAM: ICD-10-CM

## 2020-09-16 PROCEDURE — 99999 PR PBB SHADOW E&M-EST. PATIENT-LVL III: CPT | Mod: PBBFAC,,, | Performed by: FAMILY MEDICINE

## 2020-09-16 PROCEDURE — 99214 OFFICE O/P EST MOD 30 MIN: CPT | Mod: S$GLB,,, | Performed by: FAMILY MEDICINE

## 2020-09-16 PROCEDURE — 99214 PR OFFICE/OUTPT VISIT, EST, LEVL IV, 30-39 MIN: ICD-10-PCS | Mod: S$GLB,,, | Performed by: FAMILY MEDICINE

## 2020-09-16 PROCEDURE — 99999 PR PBB SHADOW E&M-EST. PATIENT-LVL III: ICD-10-PCS | Mod: PBBFAC,,, | Performed by: FAMILY MEDICINE

## 2020-09-16 RX ORDER — HYDROCODONE BITARTRATE AND ACETAMINOPHEN 5; 325 MG/1; MG/1
TABLET ORAL
COMMUNITY
Start: 2020-07-10 | End: 2020-09-16

## 2020-09-16 RX ORDER — IBUPROFEN 600 MG/1
TABLET ORAL
COMMUNITY
Start: 2020-07-10 | End: 2020-09-16

## 2020-09-16 RX ORDER — POTASSIUM CHLORIDE 750 MG/1
10 TABLET, EXTENDED RELEASE ORAL DAILY
Qty: 90 TABLET | Refills: 0 | Status: SHIPPED | OUTPATIENT
Start: 2020-09-16 | End: 2022-03-11

## 2020-09-16 NOTE — PROGRESS NOTES
Office Visit    Patient Name: Crys Peña    : 1980  MRN: [id number]      Assessment/Plan:  Crys Peña is a 40 y.o. female who presents today for :    Hospital discharge follow-up  Chest pain, unspecified type - RESOLVED  Hypokalemia  -     potassium chloride (KLOR-CON) 10 MEQ TbSR; Take 1 tablet (10 mEq total) by mouth once daily. Followup Dr.T Melara 2020, get labs 1wk before (ordered)  Dispense: 90 tablet; Refill: 0  -     Comprehensive metabolic panel; Future; Expected date: 2020  -doing well post-discharge with CP resolved. Will have patient on potassium supplementation as she has a hx of low potassium, we also discussed adequate dietary intake. Recheck labs at follow up.  Call clinic back with any concerns    Anemia, unspecified type  -     CBC Without Differential; Future; Expected date: 2020  -     Ferritin; Future  -     Iron and TIBC; Future  -recheck iron panel  -continue with daily iron supplements and iron rich diet      Follow up PRN      This note was created by combination of typed  and MModal dictation.  Transcription errors may be present.  If there are any questions, please contact me.        ----------------------------------------------------------------------------------------------------------------------      HPI:  Patient Care Team:  Shaun Melara MD as PCP - General (Family Medicine)  Myriam Craft MA as Care Coordinator    Crys is a 40 y.o. female with      Patient Active Problem List   Diagnosis    Anemia    Obesity (BMI 30-39.9)    Plantar fasciitis of right foot       Patient presents today for ED Follow-up  where she was seen yesterday for right chest pain with 1 episode of loose stools. In the ED her CXR and EKG were unremarkable, as well as labs except for low potassium, which she was give supplementation, after which she was released from the ED. Patient has been doing well since discharge without any SOB/IRIZARRY/CP. Her  What Type Of Note Output Would You Prefer (Optional)?: Standard Output How Severe Is Your Skin Lesion?: mild loose stools have resolved. Appetite is at baseline, as well as bowel routine. We reviewed labs results that was done in the ED - all normal except for mildly depressed potassium and mild anemia.      Additional ROS    No F/C/wt changes/fatigue  No dysphagia/sore throat/rhinorrhea  No IRIZARRY/palpitations/swelling/CP  No cough/wheezing/SOB  No nausea/vomiting/abd pain  No muscle aches/joint pain   No rashes  No MSK weakness/HA/tingling/numbness  No anxiety/depression          Patient Active Problem List   Diagnosis    Anemia    Obesity (BMI 30-39.9)    Plantar fasciitis of right foot       Current Medications  Medications reviewed and updated.       Current Outpatient Medications:     potassium chloride (KLOR-CON) 10 MEQ TbSR, Take 1 tablet (10 mEq total) by mouth once daily. Followup Dr.T Melara NOV-2020, get labs 1wk before (ordered), Disp: 90 tablet, Rfl: 0  No current facility-administered medications for this visit.     Past Surgical History:   Procedure Laterality Date    DILATION AND CURETTAGE OF UTERUS      miscarriage       Family History   Problem Relation Age of Onset    Breast cancer Neg Hx     Colon cancer Neg Hx     Ovarian cancer Neg Hx        Social History     Socioeconomic History    Marital status: Single     Spouse name: Not on file    Number of children: Not on file    Years of education: Not on file    Highest education level: Not on file   Occupational History    Not on file   Social Needs    Financial resource strain: Not on file    Food insecurity     Worry: Never true     Inability: Never true    Transportation needs     Medical: No     Non-medical: No   Tobacco Use    Smoking status: Never Smoker    Smokeless tobacco: Never Used   Substance and Sexual Activity    Alcohol use: No     Frequency: Monthly or less     Drinks per session: 1 or 2     Binge frequency: Never    Drug use: No    Sexual activity: Yes     Partners: Male     Birth control/protection: See Surgical Hx      "Comment: BTL    Lifestyle    Physical activity     Days per week: 5 days     Minutes per session: Not on file    Stress: To some extent   Relationships    Social connections     Talks on phone: More than three times a week     Gets together: Once a week     Attends Mandaeism service: Not on file     Active member of club or organization: No     Attends meetings of clubs or organizations: Never     Relationship status: Living with partner   Other Topics Concern    Not on file   Social History Narrative    Not on file           Allergies   Review of patient's allergies indicates:  No Known Allergies          Review of Systems  See HPI        Physical Exam  /72   Pulse 65   Temp 98.2 °F (36.8 °C)   Ht 5' 2" (1.575 m)   Wt 93.3 kg (205 lb 11 oz)   LMP 08/25/2020   SpO2 99%   BMI 37.62 kg/m²     GEN: NAD, well developed, pleasant, well nourished  HEENT: NCAT, PERRLA, EOMI, sclera clear, anicteric  NECK: normal, supple with midline trachea, no LAD, no thyromegaly  LUNGS: CTAB, no w/r/r, normal effort, no increased work of breathing,  HEART: RRR, normal S1 and S2, no m/r/g, no palpitations, no edema, normal pulses  ABD: s/nt/nd, NABS, no organomegaly, no masses  SKIN: warm and dry with normal turgor, no rashes,  PSYCH: AOx3, appropriate mood and affect.   MSK: extremities warm/well perfused, normal ROM in all 4 extremities, no c/c/e.  NEURO: normal without focal findings, CN II-XII are intact.  Sensation grossly normal, gait and station normal.                 " Has Your Skin Lesion Been Treated?: been treated Is This A New Presentation, Or A Follow-Up?: Follow Up Skin Lesion

## 2021-03-16 ENCOUNTER — OFFICE VISIT (OUTPATIENT)
Dept: OBSTETRICS AND GYNECOLOGY | Facility: CLINIC | Age: 41
End: 2021-03-16
Payer: COMMERCIAL

## 2021-03-16 VITALS
BODY MASS INDEX: 37.42 KG/M2 | SYSTOLIC BLOOD PRESSURE: 128 MMHG | DIASTOLIC BLOOD PRESSURE: 82 MMHG | WEIGHT: 204.56 LBS

## 2021-03-16 DIAGNOSIS — Z12.31 BREAST CANCER SCREENING BY MAMMOGRAM: ICD-10-CM

## 2021-03-16 DIAGNOSIS — N94.6 DYSMENORRHEA: ICD-10-CM

## 2021-03-16 DIAGNOSIS — Z01.419 WELL WOMAN EXAM WITH ROUTINE GYNECOLOGICAL EXAM: Primary | ICD-10-CM

## 2021-03-16 PROCEDURE — 99395 PREV VISIT EST AGE 18-39: CPT | Mod: S$GLB,,, | Performed by: OBSTETRICS & GYNECOLOGY

## 2021-03-16 PROCEDURE — 99999 PR PBB SHADOW E&M-EST. PATIENT-LVL III: CPT | Mod: PBBFAC,,, | Performed by: OBSTETRICS & GYNECOLOGY

## 2021-03-16 PROCEDURE — 99395 PR PREVENTIVE VISIT,EST,18-39: ICD-10-PCS | Mod: S$GLB,,, | Performed by: OBSTETRICS & GYNECOLOGY

## 2021-03-16 PROCEDURE — 99999 PR PBB SHADOW E&M-EST. PATIENT-LVL III: ICD-10-PCS | Mod: PBBFAC,,, | Performed by: OBSTETRICS & GYNECOLOGY

## 2021-04-06 ENCOUNTER — PATIENT MESSAGE (OUTPATIENT)
Dept: ADMINISTRATIVE | Facility: HOSPITAL | Age: 41
End: 2021-04-06

## 2021-04-27 ENCOUNTER — HOSPITAL ENCOUNTER (OUTPATIENT)
Dept: RADIOLOGY | Facility: HOSPITAL | Age: 41
Discharge: HOME | End: 2021-04-27
Attending: OBSTETRICS & GYNECOLOGY
Payer: COMMERCIAL

## 2021-04-27 VITALS — WEIGHT: 204.56 LBS | BODY MASS INDEX: 37.64 KG/M2 | HEIGHT: 62 IN

## 2021-04-27 DIAGNOSIS — Z12.31 BREAST CANCER SCREENING BY MAMMOGRAM: ICD-10-CM

## 2021-04-27 PROCEDURE — 77063 BREAST TOMOSYNTHESIS BI: CPT | Mod: 26,,, | Performed by: RADIOLOGY

## 2021-04-27 PROCEDURE — 77063 MAMMO DIGITAL SCREENING BILAT WITH TOMO: ICD-10-PCS | Mod: 26,,, | Performed by: RADIOLOGY

## 2021-04-27 PROCEDURE — 77067 SCR MAMMO BI INCL CAD: CPT | Mod: 26,,, | Performed by: RADIOLOGY

## 2021-04-27 PROCEDURE — 77067 MAMMO DIGITAL SCREENING BILAT WITH TOMO: ICD-10-PCS | Mod: 26,,, | Performed by: RADIOLOGY

## 2021-04-27 PROCEDURE — 77067 SCR MAMMO BI INCL CAD: CPT | Mod: TC

## 2021-04-28 ENCOUNTER — HOSPITAL ENCOUNTER (OUTPATIENT)
Dept: RADIOLOGY | Facility: HOSPITAL | Age: 41
Discharge: HOME | End: 2021-04-28
Attending: OBSTETRICS & GYNECOLOGY
Payer: COMMERCIAL

## 2021-04-28 DIAGNOSIS — N94.6 DYSMENORRHEA: ICD-10-CM

## 2021-04-28 PROCEDURE — 76856 US EXAM PELVIC COMPLETE: CPT | Mod: TC

## 2021-04-28 PROCEDURE — 76856 US EXAM PELVIC COMPLETE: CPT | Mod: 26,,, | Performed by: RADIOLOGY

## 2021-04-28 PROCEDURE — 76830 US PELVIS COMP WITH TRANSVAG NON-OB (XPD): ICD-10-PCS | Mod: 26,,, | Performed by: RADIOLOGY

## 2021-04-28 PROCEDURE — 76830 TRANSVAGINAL US NON-OB: CPT | Mod: 26,,, | Performed by: RADIOLOGY

## 2021-04-28 PROCEDURE — 76856 US PELVIS COMP WITH TRANSVAG NON-OB (XPD): ICD-10-PCS | Mod: 26,,, | Performed by: RADIOLOGY

## 2021-05-18 ENCOUNTER — HOSPITAL ENCOUNTER (EMERGENCY)
Facility: HOSPITAL | Age: 41
Discharge: HOME | End: 2021-05-18
Attending: EMERGENCY MEDICINE
Payer: COMMERCIAL

## 2021-05-18 VITALS
BODY MASS INDEX: 36.14 KG/M2 | WEIGHT: 204 LBS | SYSTOLIC BLOOD PRESSURE: 118 MMHG | HEIGHT: 63 IN | RESPIRATION RATE: 14 BRPM | HEART RATE: 58 BPM | OXYGEN SATURATION: 97 % | TEMPERATURE: 99 F | DIASTOLIC BLOOD PRESSURE: 73 MMHG

## 2021-05-18 DIAGNOSIS — K29.70 GASTRITIS, PRESENCE OF BLEEDING UNSPECIFIED, UNSPECIFIED CHRONICITY, UNSPECIFIED GASTRITIS TYPE: Primary | ICD-10-CM

## 2021-05-18 DIAGNOSIS — R10.9 ABDOMINAL PAIN: ICD-10-CM

## 2021-05-18 DIAGNOSIS — R31.9 URINARY TRACT INFECTION WITH HEMATURIA, SITE UNSPECIFIED: ICD-10-CM

## 2021-05-18 DIAGNOSIS — N39.0 URINARY TRACT INFECTION WITH HEMATURIA, SITE UNSPECIFIED: ICD-10-CM

## 2021-05-18 LAB
ALBUMIN SERPL BCP-MCNC: 3.8 G/DL (ref 3.5–5.2)
ALP SERPL-CCNC: 49 U/L (ref 55–135)
ALT SERPL W/O P-5'-P-CCNC: 14 U/L (ref 10–44)
ANION GAP SERPL CALC-SCNC: 9 MMOL/L (ref 8–16)
AST SERPL-CCNC: 21 U/L (ref 10–40)
B-HCG UR QL: NEGATIVE
BACTERIA #/AREA URNS HPF: ABNORMAL /HPF
BASOPHILS # BLD AUTO: 0.08 K/UL (ref 0–0.2)
BASOPHILS NFR BLD: 1 % (ref 0–1.9)
BILIRUB SERPL-MCNC: 0.2 MG/DL (ref 0.1–1)
BILIRUB UR QL STRIP: NEGATIVE
BUN SERPL-MCNC: 11 MG/DL (ref 6–20)
CALCIUM SERPL-MCNC: 9.6 MG/DL (ref 8.7–10.5)
CHLORIDE SERPL-SCNC: 106 MMOL/L (ref 95–110)
CLARITY UR: CLEAR
CO2 SERPL-SCNC: 26 MMOL/L (ref 23–29)
COLOR UR: YELLOW
CREAT SERPL-MCNC: 0.8 MG/DL (ref 0.5–1.4)
CTP QC/QA: YES
DIFFERENTIAL METHOD: ABNORMAL
EOSINOPHIL # BLD AUTO: 0.2 K/UL (ref 0–0.5)
EOSINOPHIL NFR BLD: 2.2 % (ref 0–8)
ERYTHROCYTE [DISTWIDTH] IN BLOOD BY AUTOMATED COUNT: 13.5 % (ref 11.5–14.5)
EST. GFR  (AFRICAN AMERICAN): >60 ML/MIN/1.73 M^2
EST. GFR  (NON AFRICAN AMERICAN): >60 ML/MIN/1.73 M^2
GLUCOSE SERPL-MCNC: 99 MG/DL (ref 70–110)
GLUCOSE UR QL STRIP: NEGATIVE
HCT VFR BLD AUTO: 38 % (ref 37–48.5)
HGB BLD-MCNC: 12 G/DL (ref 12–16)
HGB UR QL STRIP: ABNORMAL
IMM GRANULOCYTES # BLD AUTO: 0.01 K/UL (ref 0–0.04)
IMM GRANULOCYTES NFR BLD AUTO: 0.1 % (ref 0–0.5)
KETONES UR QL STRIP: NEGATIVE
LEUKOCYTE ESTERASE UR QL STRIP: ABNORMAL
LIPASE SERPL-CCNC: 45 U/L (ref 4–60)
LYMPHOCYTES # BLD AUTO: 2.3 K/UL (ref 1–4.8)
LYMPHOCYTES NFR BLD: 29.4 % (ref 18–48)
MCH RBC QN AUTO: 27.1 PG (ref 27–31)
MCHC RBC AUTO-ENTMCNC: 31.6 G/DL (ref 32–36)
MCV RBC AUTO: 86 FL (ref 82–98)
MICROSCOPIC COMMENT: ABNORMAL
MONOCYTES # BLD AUTO: 0.8 K/UL (ref 0.3–1)
MONOCYTES NFR BLD: 10.3 % (ref 4–15)
NEUTROPHILS # BLD AUTO: 4.4 K/UL (ref 1.8–7.7)
NEUTROPHILS NFR BLD: 57 % (ref 38–73)
NITRITE UR QL STRIP: NEGATIVE
NRBC BLD-RTO: 0 /100 WBC
PH UR STRIP: 5 [PH] (ref 5–8)
PLATELET # BLD AUTO: 316 K/UL (ref 150–450)
PMV BLD AUTO: 10 FL (ref 9.2–12.9)
POTASSIUM SERPL-SCNC: 4.3 MMOL/L (ref 3.5–5.1)
PROT SERPL-MCNC: 8.1 G/DL (ref 6–8.4)
PROT UR QL STRIP: NEGATIVE
RBC # BLD AUTO: 4.42 M/UL (ref 4–5.4)
RBC #/AREA URNS HPF: 3 /HPF (ref 0–4)
SODIUM SERPL-SCNC: 141 MMOL/L (ref 136–145)
SP GR UR STRIP: 1.01 (ref 1–1.03)
SQUAMOUS #/AREA URNS HPF: 0 /HPF
URN SPEC COLLECT METH UR: ABNORMAL
UROBILINOGEN UR STRIP-ACNC: NEGATIVE EU/DL
WBC # BLD AUTO: 7.66 K/UL (ref 3.9–12.7)
WBC #/AREA URNS HPF: 6 /HPF (ref 0–5)

## 2021-05-18 PROCEDURE — 81000 URINALYSIS NONAUTO W/SCOPE: CPT | Performed by: PHYSICIAN ASSISTANT

## 2021-05-18 PROCEDURE — 25000003 PHARM REV CODE 250: Performed by: PHYSICIAN ASSISTANT

## 2021-05-18 PROCEDURE — 96361 HYDRATE IV INFUSION ADD-ON: CPT

## 2021-05-18 PROCEDURE — 85025 COMPLETE CBC W/AUTO DIFF WBC: CPT | Performed by: PHYSICIAN ASSISTANT

## 2021-05-18 PROCEDURE — 81025 URINE PREGNANCY TEST: CPT | Performed by: PHYSICIAN ASSISTANT

## 2021-05-18 PROCEDURE — 99284 EMERGENCY DEPT VISIT MOD MDM: CPT | Mod: 25

## 2021-05-18 PROCEDURE — 96375 TX/PRO/DX INJ NEW DRUG ADDON: CPT

## 2021-05-18 PROCEDURE — 83690 ASSAY OF LIPASE: CPT | Performed by: PHYSICIAN ASSISTANT

## 2021-05-18 PROCEDURE — 63600175 PHARM REV CODE 636 W HCPCS: Performed by: PHYSICIAN ASSISTANT

## 2021-05-18 PROCEDURE — 80053 COMPREHEN METABOLIC PANEL: CPT | Performed by: PHYSICIAN ASSISTANT

## 2021-05-18 PROCEDURE — 96374 THER/PROPH/DIAG INJ IV PUSH: CPT

## 2021-05-18 RX ORDER — FAMOTIDINE 10 MG/ML
20 INJECTION INTRAVENOUS
Status: COMPLETED | OUTPATIENT
Start: 2021-05-18 | End: 2021-05-18

## 2021-05-18 RX ORDER — ONDANSETRON 4 MG/1
4 TABLET, FILM COATED ORAL EVERY 6 HOURS PRN
Qty: 12 TABLET | Refills: 0 | Status: SHIPPED | OUTPATIENT
Start: 2021-05-18 | End: 2022-03-11

## 2021-05-18 RX ORDER — CEPHALEXIN 250 MG/1
250 CAPSULE ORAL 4 TIMES DAILY
Qty: 28 CAPSULE | Refills: 0 | Status: SHIPPED | OUTPATIENT
Start: 2021-05-18 | End: 2021-05-25

## 2021-05-18 RX ORDER — ONDANSETRON 2 MG/ML
4 INJECTION INTRAMUSCULAR; INTRAVENOUS
Status: COMPLETED | OUTPATIENT
Start: 2021-05-18 | End: 2021-05-18

## 2021-05-18 RX ORDER — OMEPRAZOLE 20 MG/1
20 CAPSULE, DELAYED RELEASE ORAL DAILY
Qty: 30 CAPSULE | Refills: 11 | Status: SHIPPED | OUTPATIENT
Start: 2021-05-18 | End: 2022-03-11 | Stop reason: SDUPTHER

## 2021-05-18 RX ADMIN — FAMOTIDINE 20 MG: 10 INJECTION INTRAVENOUS at 10:05

## 2021-05-18 RX ADMIN — LIDOCAINE HYDROCHLORIDE: 20 SOLUTION ORAL; TOPICAL at 12:05

## 2021-05-18 RX ADMIN — ONDANSETRON 4 MG: 2 INJECTION INTRAMUSCULAR; INTRAVENOUS at 10:05

## 2021-05-18 RX ADMIN — SODIUM CHLORIDE 1000 ML: 0.9 INJECTION, SOLUTION INTRAVENOUS at 10:05

## 2021-05-25 ENCOUNTER — OFFICE VISIT (OUTPATIENT)
Dept: FAMILY MEDICINE | Facility: CLINIC | Age: 41
End: 2021-05-25
Payer: COMMERCIAL

## 2021-05-25 VITALS
OXYGEN SATURATION: 98 % | WEIGHT: 206.13 LBS | BODY MASS INDEX: 37.93 KG/M2 | HEART RATE: 75 BPM | HEIGHT: 62 IN | RESPIRATION RATE: 18 BRPM | SYSTOLIC BLOOD PRESSURE: 130 MMHG | DIASTOLIC BLOOD PRESSURE: 80 MMHG

## 2021-05-25 DIAGNOSIS — E66.9 OBESITY (BMI 30-39.9): ICD-10-CM

## 2021-05-25 DIAGNOSIS — Z00.00 ANNUAL PHYSICAL EXAM: Primary | ICD-10-CM

## 2021-05-25 DIAGNOSIS — K29.60 OTHER GASTRITIS WITHOUT HEMORRHAGE, UNSPECIFIED CHRONICITY: ICD-10-CM

## 2021-05-25 DIAGNOSIS — D64.9 ANEMIA, UNSPECIFIED TYPE: ICD-10-CM

## 2021-05-25 PROBLEM — K29.70 GASTRITIS WITHOUT BLEEDING: Status: ACTIVE | Noted: 2021-05-25

## 2021-05-25 PROCEDURE — 99396 PREV VISIT EST AGE 40-64: CPT | Mod: S$GLB,,, | Performed by: FAMILY MEDICINE

## 2021-05-25 PROCEDURE — 99999 PR PBB SHADOW E&M-EST. PATIENT-LVL III: ICD-10-PCS | Mod: PBBFAC,,, | Performed by: FAMILY MEDICINE

## 2021-05-25 PROCEDURE — 99999 PR PBB SHADOW E&M-EST. PATIENT-LVL III: CPT | Mod: PBBFAC,,, | Performed by: FAMILY MEDICINE

## 2021-05-25 PROCEDURE — 99396 PR PREVENTIVE VISIT,EST,40-64: ICD-10-PCS | Mod: S$GLB,,, | Performed by: FAMILY MEDICINE

## 2022-03-11 ENCOUNTER — OFFICE VISIT (OUTPATIENT)
Dept: FAMILY MEDICINE | Facility: CLINIC | Age: 42
End: 2022-03-11
Payer: COMMERCIAL

## 2022-03-11 VITALS
TEMPERATURE: 98 F | HEART RATE: 64 BPM | OXYGEN SATURATION: 98 % | SYSTOLIC BLOOD PRESSURE: 120 MMHG | DIASTOLIC BLOOD PRESSURE: 80 MMHG | BODY MASS INDEX: 38.62 KG/M2 | WEIGHT: 209.88 LBS | HEIGHT: 62 IN

## 2022-03-11 DIAGNOSIS — Z12.31 ENCOUNTER FOR SCREENING MAMMOGRAM FOR MALIGNANT NEOPLASM OF BREAST: ICD-10-CM

## 2022-03-11 DIAGNOSIS — K29.60 OTHER GASTRITIS WITHOUT HEMORRHAGE, UNSPECIFIED CHRONICITY: ICD-10-CM

## 2022-03-11 DIAGNOSIS — L29.9 ITCHING: Primary | ICD-10-CM

## 2022-03-11 DIAGNOSIS — J30.89 SEASONAL ALLERGIC RHINITIS DUE TO OTHER ALLERGIC TRIGGER: ICD-10-CM

## 2022-03-11 PROCEDURE — 99214 PR OFFICE/OUTPT VISIT, EST, LEVL IV, 30-39 MIN: ICD-10-PCS | Mod: S$GLB,,, | Performed by: FAMILY MEDICINE

## 2022-03-11 PROCEDURE — 99999 PR PBB SHADOW E&M-EST. PATIENT-LVL III: CPT | Mod: PBBFAC,,, | Performed by: FAMILY MEDICINE

## 2022-03-11 PROCEDURE — 99999 PR PBB SHADOW E&M-EST. PATIENT-LVL III: ICD-10-PCS | Mod: PBBFAC,,, | Performed by: FAMILY MEDICINE

## 2022-03-11 PROCEDURE — 99214 OFFICE O/P EST MOD 30 MIN: CPT | Mod: S$GLB,,, | Performed by: FAMILY MEDICINE

## 2022-03-11 RX ORDER — OMEPRAZOLE 40 MG/1
40 CAPSULE, DELAYED RELEASE ORAL DAILY
Qty: 30 CAPSULE | Refills: 11 | Status: SHIPPED | OUTPATIENT
Start: 2022-03-11 | End: 2023-07-13 | Stop reason: SDUPTHER

## 2022-03-11 RX ORDER — FEXOFENADINE HCL 60 MG
60 TABLET ORAL DAILY
Qty: 120 TABLET | Refills: 3 | Status: SHIPPED | OUTPATIENT
Start: 2022-03-11 | End: 2023-03-27 | Stop reason: SDUPTHER

## 2022-03-11 NOTE — PROGRESS NOTES
"  Physical Exam  /80   Pulse 64   Temp 98.1 °F (36.7 °C) (Oral)   Ht 5' 2" (1.575 m)   Wt 95.2 kg (209 lb 14.1 oz)   LMP 2022   SpO2 98%   BMI 38.39 kg/m²      Office Visit    Patient Name: Crys Peña    : 1980  MRN: [id number]      Assessment/Plan:  Crys Peña is a 41 y.o. female who presents today for :    Itching  -     fexofenadine (ALLEGRA) 60 MG tablet; Take 1 tablet (60 mg total) by mouth once daily.  Dispense: 120 tablet; Refill: 3  -switch to Allegra for less sedation, advised adequate hydration to improve skin health and prevent skin dryness    -Seasonal allergic rhinitis due to other allergic trigger - controlled on PRN antihistamine  -     fexofenadine (ALLEGRA) 60 MG tablet; Take 1 tablet (60 mg total) by mouth once daily.  Dispense: 120 tablet; Refill: 3    Other gastritis without hemorrhage, unspecified chronicity  -     omeprazole (PRILOSEC) 40 MG capsule; Take 1 capsule (40 mg total) by mouth once daily.  Dispense: 30 capsule; Refill: 11  -continue with antacid and avoid potential dietary triggers    Encounter for screening mammogram for malignant neoplasm of breast  -     Mammo Digital Screening Bilat; Future; Expected date: 2022        Follow up PRN        This note was created by combination of typed  and MModal dictation.  Transcription errors may be present.  If there are any questions, please contact me.        ----------------------------------------------------------------------------------------------------------------------      HPI:  Patient Care Team:  Shaun Melara MD as PCP - General (Family Medicine)  Myriam Craft MA (Inactive) as Care Coordinator    Crys is a 41 y.o. female with      Patient Active Problem List   Diagnosis    Anemia    Obesity (BMI 30-39.9)    Plantar fasciitis of right foot    Gastritis without bleeding       Patient with PMHx as above presents today for skin itchiness on face and chest for the " "past few weeks - which she attributes to her allergy Sx. No rashes. She does take Benadryl daily for her sinus allergies,which also helps relieve the itching but she states Benadryl also makes her feel drowsy. She uses body lotion daily.    Gastritis - well controlled on PPI as along she takes her medication daily, which she has run out recently and needs refills today. She tries to stay away from heavy foods that triggers her epigastric pain. No nausea/vomiting/no diarrhea    Additional ROS    No F/C/wt changes/fatigue  No dysphagia/sore throat/rhinorrhea  No CP/SOB/palpitations/swelling  No cough/wheezing/SOB  No MSK weakness/HA/tingling/numbness            Current Medications  Medications reviewed and updated.       Current Outpatient Medications:     fexofenadine (ALLEGRA) 60 MG tablet, Take 1 tablet (60 mg total) by mouth once daily., Disp: 120 tablet, Rfl: 3    omeprazole (PRILOSEC) 40 MG capsule, Take 1 capsule (40 mg total) by mouth once daily., Disp: 30 capsule, Rfl: 11    Past Surgical History:   Procedure Laterality Date    DILATION AND CURETTAGE OF UTERUS      miscarriage       Family History   Problem Relation Age of Onset    Breast cancer Neg Hx     Colon cancer Neg Hx     Ovarian cancer Neg Hx        Social History     Socioeconomic History    Marital status: Single   Tobacco Use    Smoking status: Never Smoker    Smokeless tobacco: Never Used   Substance and Sexual Activity    Alcohol use: No    Drug use: No    Sexual activity: Yes     Partners: Male     Birth control/protection: See Surgical Hx     Comment: BTL            Allergies   Review of patient's allergies indicates:  No Known Allergies          Review of Systems  See HPI      [unfilled]  /80   Pulse 64   Temp 98.1 °F (36.7 °C) (Oral)   Ht 5' 2" (1.575 m)   Wt 95.2 kg (209 lb 14.1 oz)   LMP 02/28/2022   SpO2 98%   BMI 38.39 kg/m²     GEN: NAD, pleasant  HEENT: NCAT, PERRLA, EOMI, sclera clear, anicteric, O/P clear, " MMM with no lesions  NECK: normal, supple  LUNGS: CTAB, no w/r/r, no increased work of breathing   HEART: RRR, normal S1 and S2, no m/r/g, no edema  ABD: s/nt/nd, NABS  SKIN: normal turgor, no rash on gross exam today  PSYCH: AOx3, appropriate mood and affect  MSK: warm/well perfused, normal ROM in all extremities, no c/c/e.

## 2022-03-15 ENCOUNTER — PATIENT MESSAGE (OUTPATIENT)
Dept: FAMILY MEDICINE | Facility: CLINIC | Age: 42
End: 2022-03-15
Payer: COMMERCIAL

## 2022-04-04 ENCOUNTER — LAB VISIT (OUTPATIENT)
Dept: LAB | Facility: HOSPITAL | Age: 42
End: 2022-04-04
Attending: FAMILY MEDICINE
Payer: COMMERCIAL

## 2022-04-04 ENCOUNTER — OFFICE VISIT (OUTPATIENT)
Dept: OBSTETRICS AND GYNECOLOGY | Facility: CLINIC | Age: 42
End: 2022-04-04
Payer: COMMERCIAL

## 2022-04-04 VITALS
DIASTOLIC BLOOD PRESSURE: 90 MMHG | WEIGHT: 206.81 LBS | SYSTOLIC BLOOD PRESSURE: 124 MMHG | BODY MASS INDEX: 37.82 KG/M2

## 2022-04-04 DIAGNOSIS — D64.9 ANEMIA, UNSPECIFIED TYPE: ICD-10-CM

## 2022-04-04 DIAGNOSIS — Z00.00 ANNUAL PHYSICAL EXAM: ICD-10-CM

## 2022-04-04 DIAGNOSIS — Z01.419 WELL WOMAN EXAM WITH ROUTINE GYNECOLOGICAL EXAM: Primary | ICD-10-CM

## 2022-04-04 LAB
ALBUMIN SERPL BCP-MCNC: 3.7 G/DL (ref 3.5–5.2)
ALP SERPL-CCNC: 50 U/L (ref 55–135)
ALT SERPL W/O P-5'-P-CCNC: 12 U/L (ref 10–44)
ANION GAP SERPL CALC-SCNC: 9 MMOL/L (ref 8–16)
AST SERPL-CCNC: 19 U/L (ref 10–40)
BASOPHILS # BLD AUTO: 0.05 K/UL (ref 0–0.2)
BASOPHILS NFR BLD: 0.6 % (ref 0–1.9)
BILIRUB SERPL-MCNC: 0.3 MG/DL (ref 0.1–1)
BUN SERPL-MCNC: 12 MG/DL (ref 6–20)
CALCIUM SERPL-MCNC: 9.3 MG/DL (ref 8.7–10.5)
CHLORIDE SERPL-SCNC: 108 MMOL/L (ref 95–110)
CHOLEST SERPL-MCNC: 138 MG/DL (ref 120–199)
CHOLEST/HDLC SERPL: 3.9 {RATIO} (ref 2–5)
CO2 SERPL-SCNC: 25 MMOL/L (ref 23–29)
CREAT SERPL-MCNC: 0.9 MG/DL (ref 0.5–1.4)
DIFFERENTIAL METHOD: ABNORMAL
EOSINOPHIL # BLD AUTO: 0.1 K/UL (ref 0–0.5)
EOSINOPHIL NFR BLD: 1.5 % (ref 0–8)
ERYTHROCYTE [DISTWIDTH] IN BLOOD BY AUTOMATED COUNT: 14.1 % (ref 11.5–14.5)
ERYTHROCYTE [DISTWIDTH] IN BLOOD BY AUTOMATED COUNT: 14.1 % (ref 11.5–14.5)
EST. GFR  (AFRICAN AMERICAN): >60 ML/MIN/1.73 M^2
EST. GFR  (NON AFRICAN AMERICAN): >60 ML/MIN/1.73 M^2
ESTIMATED AVG GLUCOSE: 126 MG/DL (ref 68–131)
FERRITIN SERPL-MCNC: 38 NG/ML (ref 20–300)
GLUCOSE SERPL-MCNC: 92 MG/DL (ref 70–110)
HBA1C MFR BLD: 6 % (ref 4–5.6)
HCT VFR BLD AUTO: 37.7 % (ref 37–48.5)
HCT VFR BLD AUTO: 37.7 % (ref 37–48.5)
HDLC SERPL-MCNC: 35 MG/DL (ref 40–75)
HDLC SERPL: 25.4 % (ref 20–50)
HGB BLD-MCNC: 11.6 G/DL (ref 12–16)
HGB BLD-MCNC: 11.6 G/DL (ref 12–16)
IMM GRANULOCYTES # BLD AUTO: 0.04 K/UL (ref 0–0.04)
IMM GRANULOCYTES NFR BLD AUTO: 0.5 % (ref 0–0.5)
IRON SERPL-MCNC: 42 UG/DL (ref 30–160)
LDLC SERPL CALC-MCNC: 88.4 MG/DL (ref 63–159)
LYMPHOCYTES # BLD AUTO: 2.6 K/UL (ref 1–4.8)
LYMPHOCYTES NFR BLD: 30.3 % (ref 18–48)
MCH RBC QN AUTO: 26.7 PG (ref 27–31)
MCH RBC QN AUTO: 26.7 PG (ref 27–31)
MCHC RBC AUTO-ENTMCNC: 30.8 G/DL (ref 32–36)
MCHC RBC AUTO-ENTMCNC: 30.8 G/DL (ref 32–36)
MCV RBC AUTO: 87 FL (ref 82–98)
MCV RBC AUTO: 87 FL (ref 82–98)
MONOCYTES # BLD AUTO: 0.5 K/UL (ref 0.3–1)
MONOCYTES NFR BLD: 5.6 % (ref 4–15)
NEUTROPHILS # BLD AUTO: 5.2 K/UL (ref 1.8–7.7)
NEUTROPHILS NFR BLD: 61.5 % (ref 38–73)
NONHDLC SERPL-MCNC: 103 MG/DL
NRBC BLD-RTO: 0 /100 WBC
PLATELET # BLD AUTO: 370 K/UL (ref 150–450)
PLATELET # BLD AUTO: 370 K/UL (ref 150–450)
PMV BLD AUTO: 10.8 FL (ref 9.2–12.9)
PMV BLD AUTO: 10.8 FL (ref 9.2–12.9)
POTASSIUM SERPL-SCNC: 3.8 MMOL/L (ref 3.5–5.1)
PROT SERPL-MCNC: 7.8 G/DL (ref 6–8.4)
RBC # BLD AUTO: 4.35 M/UL (ref 4–5.4)
RBC # BLD AUTO: 4.35 M/UL (ref 4–5.4)
SATURATED IRON: 11 % (ref 20–50)
SODIUM SERPL-SCNC: 142 MMOL/L (ref 136–145)
TOTAL IRON BINDING CAPACITY: 373 UG/DL (ref 250–450)
TRANSFERRIN SERPL-MCNC: 252 MG/DL (ref 200–375)
TRIGL SERPL-MCNC: 73 MG/DL (ref 30–150)
TSH SERPL DL<=0.005 MIU/L-ACNC: 2.17 UIU/ML (ref 0.4–4)
WBC # BLD AUTO: 8.52 K/UL (ref 3.9–12.7)
WBC # BLD AUTO: 8.52 K/UL (ref 3.9–12.7)

## 2022-04-04 PROCEDURE — 86803 HEPATITIS C AB TEST: CPT | Performed by: FAMILY MEDICINE

## 2022-04-04 PROCEDURE — 85025 COMPLETE CBC W/AUTO DIFF WBC: CPT | Performed by: FAMILY MEDICINE

## 2022-04-04 PROCEDURE — 99999 PR PBB SHADOW E&M-EST. PATIENT-LVL II: CPT | Mod: PBBFAC,,, | Performed by: OBSTETRICS & GYNECOLOGY

## 2022-04-04 PROCEDURE — 82728 ASSAY OF FERRITIN: CPT | Performed by: FAMILY MEDICINE

## 2022-04-04 PROCEDURE — 84466 ASSAY OF TRANSFERRIN: CPT | Performed by: FAMILY MEDICINE

## 2022-04-04 PROCEDURE — 36415 COLL VENOUS BLD VENIPUNCTURE: CPT | Mod: PO | Performed by: FAMILY MEDICINE

## 2022-04-04 PROCEDURE — 80053 COMPREHEN METABOLIC PANEL: CPT | Performed by: FAMILY MEDICINE

## 2022-04-04 PROCEDURE — 80061 LIPID PANEL: CPT | Performed by: FAMILY MEDICINE

## 2022-04-04 PROCEDURE — 84443 ASSAY THYROID STIM HORMONE: CPT | Performed by: FAMILY MEDICINE

## 2022-04-04 PROCEDURE — 99396 PREV VISIT EST AGE 40-64: CPT | Mod: S$GLB,,, | Performed by: OBSTETRICS & GYNECOLOGY

## 2022-04-04 PROCEDURE — 83036 HEMOGLOBIN GLYCOSYLATED A1C: CPT | Performed by: FAMILY MEDICINE

## 2022-04-04 PROCEDURE — 99999 PR PBB SHADOW E&M-EST. PATIENT-LVL II: ICD-10-PCS | Mod: PBBFAC,,, | Performed by: OBSTETRICS & GYNECOLOGY

## 2022-04-04 PROCEDURE — 99396 PR PREVENTIVE VISIT,EST,40-64: ICD-10-PCS | Mod: S$GLB,,, | Performed by: OBSTETRICS & GYNECOLOGY

## 2022-04-04 NOTE — PROGRESS NOTES
SUBJECTIVE:   Crys Peña is a 42 y.o. female   for annual well woman exam. Patient's last menstrual period was 2022 (exact date)..  She has no unusual complaints.      Contraception: btl    Past Medical History:   Diagnosis Date    Pregnancy      Past Surgical History:   Procedure Laterality Date    DILATION AND CURETTAGE OF UTERUS      miscarriage     Social History     Socioeconomic History    Marital status: Single   Tobacco Use    Smoking status: Never Smoker    Smokeless tobacco: Never Used   Substance and Sexual Activity    Alcohol use: No    Drug use: No    Sexual activity: Yes     Partners: Male     Birth control/protection: See Surgical Hx     Comment: BTL      Family History   Problem Relation Age of Onset    Breast cancer Neg Hx     Colon cancer Neg Hx     Ovarian cancer Neg Hx      OB History    Para Term  AB Living   6 5 5   1 4   SAB IAB Ectopic Multiple Live Births   1     0 4      # Outcome Date GA Lbr Hernan/2nd Weight Sex Delivery Anes PTL Lv   6 Term 17 38w5d / 01:25 2.76 kg (6 lb 1.4 oz) F Vag-Spont EPI N    5 SAB            4 Term 12 40w0d  3.175 kg (7 lb) F Vag-Spont EPI N RUPERTO   3 Term 04 40w0d  3.175 kg (7 lb) M Vag-Spont EPI N RUPERTO   2 Term 02 37w0d  2.722 kg (6 lb) F Vag-Spont EPI N RUPERTO   1 Term 08/10/01 42w0d  3.175 kg (7 lb) F Vag-Spont EPI N RUPERTO      Obstetric Comments   Gynhx: reg/3.  Normal flow   S/p btl   Denies std   Denies abnl pap, pap  neg/hpv neg         Current Outpatient Medications   Medication Sig Dispense Refill    fexofenadine (ALLEGRA) 60 MG tablet Take 1 tablet (60 mg total) by mouth once daily. 120 tablet 3    omeprazole (PRILOSEC) 40 MG capsule Take 1 capsule (40 mg total) by mouth once daily. 30 capsule 11     No current facility-administered medications for this visit.     Allergies: Patient has no known allergies.       ROS:  GENERAL: Denies weight gain or weight loss. Feeling well overall.    SKIN: Denies rash or lesions.   HEAD: Denies head injury or headache.   NODES: Denies enlarged lymph nodes.   CHEST: Denies chest pain or shortness of breath.   CARDIOVASCULAR: Denies palpitations or left sided chest pain.   ABDOMEN: No abdominal pain, constipation, diarrhea, nausea, vomiting or rectal bleeding.   URINARY: No frequency, dysuria, hematuria, or burning on urination.  REPRODUCTIVE: Denies vaginal discharge, abnormal vaginal bleeding, lesions, pelvic pain  BREASTS: The patient performs breast self-examination and denies pain, lumps, or nipple discharge.   HEMATOLOGIC: No easy bruisability or excessive bleeding.  MUSCULOSKELETAL: Denies joint pain or swelling.   NEUROLOGIC: Denies syncope or weakness.   PSYCHIATRIC: Denies depression, anxiety or mood swings.      OBJECTIVE:   BP (!) 124/90   Wt 93.8 kg (206 lb 12.7 oz)   LMP 03/28/2022 (Exact Date)   BMI 37.82 kg/m²   The patient appears well, alert, oriented x 3, in no distress.  PSYCH:  Normal, full range of affect  NECK: negative, no thyromegaly, trachea midline  SKIN: normal, good color, good turgor and no acne, striae, hirsutism  BREAST EXAM: breasts appear normal, no suspicious masses, no skin or nipple changes or axillary nodes  ABDOMEN: soft, non-tender; bowel sounds normal; no masses,  no organomegaly and no hernias, masses, or hepatosplenomegaly  GENITALIA: normal external genitalia, no erythema, no discharge  BLADDER: soft  URETHRA: normal appearing urethra with no masses, tenderness or lesions and normal urethra, normal urethral meatus  VAGINA: Normal  CERVIX: no lesions or cervical motion tenderness  UTERUS: normal size, contour, position, consistency, mobility, non-tender  ADNEXA: no mass, fullness, tenderness      ASSESSMENT:   1. Health maintenance  -pap UTD  -screening MMG ordered  -counseled on exercise and healthy diet, weight loss  -bone health:  Discussed Vitamin D and Calcium supplementation, weight bearing exercises  2.   Discussed safety at home/school/work, healthy and balanced diet, sleep hygiene, as well as violence/weapons exposure.

## 2022-04-05 LAB — HCV AB SERPL QL IA: NEGATIVE

## 2022-04-28 ENCOUNTER — HOSPITAL ENCOUNTER (OUTPATIENT)
Dept: RADIOLOGY | Facility: HOSPITAL | Age: 42
Discharge: HOME | End: 2022-04-28
Attending: FAMILY MEDICINE
Payer: COMMERCIAL

## 2022-04-28 DIAGNOSIS — Z12.31 ENCOUNTER FOR SCREENING MAMMOGRAM FOR MALIGNANT NEOPLASM OF BREAST: ICD-10-CM

## 2022-04-28 PROCEDURE — 77063 MAMMO DIGITAL SCREENING BILAT WITH TOMO: ICD-10-PCS | Mod: 26,,, | Performed by: RADIOLOGY

## 2022-04-28 PROCEDURE — 77067 MAMMO DIGITAL SCREENING BILAT WITH TOMO: ICD-10-PCS | Mod: 26,,, | Performed by: RADIOLOGY

## 2022-04-28 PROCEDURE — 77067 SCR MAMMO BI INCL CAD: CPT | Mod: TC

## 2022-04-28 PROCEDURE — 77063 BREAST TOMOSYNTHESIS BI: CPT | Mod: 26,,, | Performed by: RADIOLOGY

## 2022-04-28 PROCEDURE — 77067 SCR MAMMO BI INCL CAD: CPT | Mod: 26,,, | Performed by: RADIOLOGY

## 2022-05-19 ENCOUNTER — TELEPHONE (OUTPATIENT)
Dept: FAMILY MEDICINE | Facility: CLINIC | Age: 42
End: 2022-05-19
Payer: COMMERCIAL

## 2022-05-20 ENCOUNTER — OFFICE VISIT (OUTPATIENT)
Dept: FAMILY MEDICINE | Facility: CLINIC | Age: 42
End: 2022-05-20
Payer: COMMERCIAL

## 2022-05-20 VITALS
OXYGEN SATURATION: 98 % | BODY MASS INDEX: 37.29 KG/M2 | DIASTOLIC BLOOD PRESSURE: 80 MMHG | HEIGHT: 62 IN | TEMPERATURE: 98 F | HEART RATE: 75 BPM | WEIGHT: 202.63 LBS | SYSTOLIC BLOOD PRESSURE: 110 MMHG

## 2022-05-20 DIAGNOSIS — D50.8 IRON DEFICIENCY ANEMIA SECONDARY TO INADEQUATE DIETARY IRON INTAKE: ICD-10-CM

## 2022-05-20 DIAGNOSIS — Z00.00 ANNUAL PHYSICAL EXAM: Primary | ICD-10-CM

## 2022-05-20 DIAGNOSIS — R40.0 DAYTIME SOMNOLENCE: ICD-10-CM

## 2022-05-20 DIAGNOSIS — K29.60 OTHER GASTRITIS WITHOUT HEMORRHAGE, UNSPECIFIED CHRONICITY: ICD-10-CM

## 2022-05-20 DIAGNOSIS — R06.83 LOUD SNORING: ICD-10-CM

## 2022-05-20 DIAGNOSIS — J30.89 NON-SEASONAL ALLERGIC RHINITIS DUE TO OTHER ALLERGIC TRIGGER: ICD-10-CM

## 2022-05-20 DIAGNOSIS — R73.03 PREDIABETES: ICD-10-CM

## 2022-05-20 DIAGNOSIS — E66.9 OBESITY (BMI 30-39.9): ICD-10-CM

## 2022-05-20 PROCEDURE — 99396 PR PREVENTIVE VISIT,EST,40-64: ICD-10-PCS | Mod: S$GLB,,, | Performed by: FAMILY MEDICINE

## 2022-05-20 PROCEDURE — 99999 PR PBB SHADOW E&M-EST. PATIENT-LVL IV: CPT | Mod: PBBFAC,,, | Performed by: FAMILY MEDICINE

## 2022-05-20 PROCEDURE — 99999 PR PBB SHADOW E&M-EST. PATIENT-LVL IV: ICD-10-PCS | Mod: PBBFAC,,, | Performed by: FAMILY MEDICINE

## 2022-05-20 PROCEDURE — 99396 PREV VISIT EST AGE 40-64: CPT | Mod: S$GLB,,, | Performed by: FAMILY MEDICINE

## 2022-05-20 NOTE — PROGRESS NOTES
"  Physical Exam  /80   Pulse 75   Temp 98.2 °F (36.8 °C) (Oral)   Ht 5' 2" (1.575 m)   Wt 91.9 kg (202 lb 9.6 oz)   LMP 2022   SpO2 98%   BMI 37.06 kg/m²      Office Visit    Patient Name: Crys Peña    : 1980  MRN: [id number]      Assessment/Plan:  Crys Pñea is a 42 y.o. female who presents today for :    Annual physical exam  -     Hemoglobin A1C; Future; Expected date: 2022  -     CBC Without Differential; Future; Expected date: 2022  -     Comprehensive Metabolic Panel; Future; Expected date: 2022  -     Lipid Panel; Future; Expected date: 2022  -     TSH; Future; Expected date: 2022  Obesity (BMI 30-39.9)  Prediabetes  -previous labs reviewed and discussed with patient  -anticipatory guidance provided with age appropriate preventative services discussed, healthy diet and regular physical exercise also discussed with patient. Stressed portion control with low carb/low fat diet, counseled on weight loss  -Recommend 15-30 minutes of moderate intensity exercise 5 days/week.    Other gastritis without hemorrhage, unspecified chronicity  -stable, continue current regimen with PPI    Iron deficiency anemia secondary to inadequate dietary iron intake  -     CBC Without Differential; Future; Expected date: 2022  -     Ferritin; Future  -     Iron and TIBC; Future    -Allergic rhinitis   - controlled on PRN Fexofenadine    Loud snoring  -     Ambulatory referral/consult to Sleep Disorders; Future; Expected date: 2022  Daytime somnolence  -     Ambulatory referral/consult to Sleep Disorders; Future; Expected date: 2022              Follow up 6mo    This note was created by combination of typed  and MModal dictation.  Transcription errors may be present.  If there are any questions, please contact " me.      ----------------------------------------------------------------------------------------------------------------------      HPI:  Patient Care Team:  Shaun Melara MD as PCP - General (Family Medicine)  Myriam Craft MA (Inactive) as Care Coordinator    Crys is a 42 y.o. female with      Patient Active Problem List   Diagnosis    Anemia    Obesity (BMI 30-39.9)    Plantar fasciitis of right foot    Gastritis without bleeding    Non-seasonal allergic rhinitis    Prediabetes         Crys has PMHx as noted above and presents today for Annual      He is doing well and has no major complaints today except for loud snoring at night as well as daytime drowsiness for the past year, for which she would like to see a sleep specialist. No prior Hx sleep apnea. Health maintenance-wise, she is up to date on MMG and Pap screening. She denies any cardiovascular or neurologic complaints today        Additional ROS    CONST: no fever, no activity change, weight stable.   EYES: no vision change.   ENT: no sore throat. No dysphagia.   CV: no CP with exertion  RESP: no SOB  GI: no N/V/diarrhea/constipation  : no urinary concerns  MSK: no new myalgias or arthralgias.   SKIN: no new rashes  NEURO: no focal deficits.   PSYCH: no new issues.   ENDOCRINE: no polyuria.             Current Medications  Medications reviewed and updated.       Current Outpatient Medications:     fexofenadine (ALLEGRA) 60 MG tablet, Take 1 tablet (60 mg total) by mouth once daily., Disp: 120 tablet, Rfl: 3    omeprazole (PRILOSEC) 40 MG capsule, Take 1 capsule (40 mg total) by mouth once daily., Disp: 30 capsule, Rfl: 11    Past Surgical History:   Procedure Laterality Date    DILATION AND CURETTAGE OF UTERUS      miscarriage       Family History   Problem Relation Age of Onset    Breast cancer Neg Hx     Colon cancer Neg Hx     Ovarian cancer Neg Hx        Social History     Socioeconomic History    Marital status: Single  "  Tobacco Use    Smoking status: Never Smoker    Smokeless tobacco: Never Used   Substance and Sexual Activity    Alcohol use: No    Drug use: No    Sexual activity: Yes     Partners: Male     Birth control/protection: See Surgical Hx     Comment: BTL            Allergies   Review of patient's allergies indicates:  No Known Allergies          Review of Systems  See HPI      [unfilled]  /80   Pulse 75   Temp 98.2 °F (36.8 °C) (Oral)   Ht 5' 2" (1.575 m)   Wt 91.9 kg (202 lb 9.6 oz)   LMP 05/01/2022   SpO2 98%   BMI 37.06 kg/m²     GEN: NAD, well developed, pleasant, well nourished  HEENT: NCAT, PERRLA, EOMI, sclera clear, anicteric, bilateral ear exam wnl, O/P clear, MMM with no lesions  NECK: normal, supple with midline trachea, no LAD, no thyromegaly  LUNGS: CTAB, no w/r/r, no increased work of breathing   HEART: RRR, normal S1 and S2, no m/r/g, no edema  ABD: s/nt/nd, NABS  SKIN: normal turgor, no rashes  PSYCH: AOx3, appropriate mood and affect  MSK: warm/well perfused, normal ROM in all extremities, no c/c/e.  NEURO: normal without focal findings, CN II-XII are grossly intact.  Sensation/strength grossly normal, gait and station normal.         Labs  Lab Results   Component Value Date    HGBA1C 6.0 (H) 04/04/2022     Lab Results   Component Value Date     04/04/2022    K 3.8 04/04/2022     04/04/2022    CO2 25 04/04/2022    BUN 12 04/04/2022    CREATININE 0.9 04/04/2022    CALCIUM 9.3 04/04/2022    ANIONGAP 9 04/04/2022    ESTGFRAFRICA >60.0 04/04/2022    EGFRNONAA >60.0 04/04/2022     Lab Results   Component Value Date    CHOL 138 04/04/2022     Lab Results   Component Value Date    HDL 35 (L) 04/04/2022     Lab Results   Component Value Date    LDLCALC 88.4 04/04/2022     Lab Results   Component Value Date    TRIG 73 04/04/2022     Lab Results   Component Value Date    CHOLHDL 25.4 04/04/2022     Last set of blood work has been reviewed as noted above.                "

## 2022-06-16 PROBLEM — R73.03 PREDIABETES: Status: ACTIVE | Noted: 2022-06-16

## 2023-05-10 DIAGNOSIS — Z12.31 OTHER SCREENING MAMMOGRAM: ICD-10-CM

## 2023-05-16 ENCOUNTER — PATIENT MESSAGE (OUTPATIENT)
Dept: ADMINISTRATIVE | Facility: HOSPITAL | Age: 43
End: 2023-05-16
Payer: MEDICAID

## 2023-06-28 ENCOUNTER — HOSPITAL ENCOUNTER (OUTPATIENT)
Dept: RADIOLOGY | Facility: HOSPITAL | Age: 43
Discharge: HOME | End: 2023-06-28
Attending: FAMILY MEDICINE
Payer: MEDICAID

## 2023-06-28 DIAGNOSIS — Z12.31 OTHER SCREENING MAMMOGRAM: ICD-10-CM

## 2023-06-28 PROCEDURE — 77067 SCR MAMMO BI INCL CAD: CPT | Mod: 26,,, | Performed by: RADIOLOGY

## 2023-06-28 PROCEDURE — 77067 SCR MAMMO BI INCL CAD: CPT | Mod: TC

## 2023-06-28 PROCEDURE — 77063 BREAST TOMOSYNTHESIS BI: CPT | Mod: 26,,, | Performed by: RADIOLOGY

## 2023-06-28 PROCEDURE — 77063 MAMMO DIGITAL SCREENING BILAT WITH TOMO: ICD-10-PCS | Mod: 26,,, | Performed by: RADIOLOGY

## 2023-06-28 PROCEDURE — 77067 MAMMO DIGITAL SCREENING BILAT WITH TOMO: ICD-10-PCS | Mod: 26,,, | Performed by: RADIOLOGY

## 2023-07-13 ENCOUNTER — OFFICE VISIT (OUTPATIENT)
Dept: FAMILY MEDICINE | Facility: CLINIC | Age: 43
End: 2023-07-13
Payer: MEDICAID

## 2023-07-13 ENCOUNTER — LAB VISIT (OUTPATIENT)
Dept: LAB | Facility: HOSPITAL | Age: 43
End: 2023-07-13
Attending: FAMILY MEDICINE
Payer: MEDICAID

## 2023-07-13 VITALS
DIASTOLIC BLOOD PRESSURE: 80 MMHG | SYSTOLIC BLOOD PRESSURE: 110 MMHG | OXYGEN SATURATION: 98 % | TEMPERATURE: 98 F | BODY MASS INDEX: 37.61 KG/M2 | HEIGHT: 62 IN | WEIGHT: 204.38 LBS | HEART RATE: 73 BPM

## 2023-07-13 DIAGNOSIS — D50.8 IRON DEFICIENCY ANEMIA SECONDARY TO INADEQUATE DIETARY IRON INTAKE: ICD-10-CM

## 2023-07-13 DIAGNOSIS — Z00.00 ANNUAL PHYSICAL EXAM: ICD-10-CM

## 2023-07-13 DIAGNOSIS — Z00.00 ANNUAL PHYSICAL EXAM: Primary | ICD-10-CM

## 2023-07-13 DIAGNOSIS — K29.60 OTHER GASTRITIS WITHOUT HEMORRHAGE, UNSPECIFIED CHRONICITY: ICD-10-CM

## 2023-07-13 DIAGNOSIS — R73.03 PREDIABETES: ICD-10-CM

## 2023-07-13 DIAGNOSIS — G56.03 BILATERAL CARPAL TUNNEL SYNDROME: ICD-10-CM

## 2023-07-13 DIAGNOSIS — J30.89 SEASONAL ALLERGIC RHINITIS DUE TO OTHER ALLERGIC TRIGGER: ICD-10-CM

## 2023-07-13 DIAGNOSIS — E66.9 OBESITY (BMI 30-39.9): ICD-10-CM

## 2023-07-13 PROBLEM — J30.2 SEASONAL ALLERGIC RHINITIS: Status: ACTIVE | Noted: 2022-05-20

## 2023-07-13 LAB
ALBUMIN SERPL BCP-MCNC: 3.4 G/DL (ref 3.5–5.2)
ALP SERPL-CCNC: 46 U/L (ref 55–135)
ALT SERPL W/O P-5'-P-CCNC: 8 U/L (ref 10–44)
ANION GAP SERPL CALC-SCNC: 10 MMOL/L (ref 8–16)
AST SERPL-CCNC: 17 U/L (ref 10–40)
BILIRUB SERPL-MCNC: 0.2 MG/DL (ref 0.1–1)
BUN SERPL-MCNC: 13 MG/DL (ref 6–20)
CALCIUM SERPL-MCNC: 9.2 MG/DL (ref 8.7–10.5)
CHLORIDE SERPL-SCNC: 108 MMOL/L (ref 95–110)
CHOLEST SERPL-MCNC: 134 MG/DL (ref 120–199)
CHOLEST/HDLC SERPL: 3.5 {RATIO} (ref 2–5)
CO2 SERPL-SCNC: 25 MMOL/L (ref 23–29)
CREAT SERPL-MCNC: 0.8 MG/DL (ref 0.5–1.4)
ERYTHROCYTE [DISTWIDTH] IN BLOOD BY AUTOMATED COUNT: 14.8 % (ref 11.5–14.5)
EST. GFR  (NO RACE VARIABLE): >60 ML/MIN/1.73 M^2
ESTIMATED AVG GLUCOSE: 120 MG/DL (ref 68–131)
FERRITIN SERPL-MCNC: 19 NG/ML (ref 20–300)
GLUCOSE SERPL-MCNC: 97 MG/DL (ref 70–110)
HBA1C MFR BLD: 5.8 % (ref 4–5.6)
HCT VFR BLD AUTO: 37.5 % (ref 37–48.5)
HDLC SERPL-MCNC: 38 MG/DL (ref 40–75)
HDLC SERPL: 28.4 % (ref 20–50)
HGB BLD-MCNC: 11.1 G/DL (ref 12–16)
IRON SERPL-MCNC: 34 UG/DL (ref 30–160)
LDLC SERPL CALC-MCNC: 87.2 MG/DL (ref 63–159)
MCH RBC QN AUTO: 25.8 PG (ref 27–31)
MCHC RBC AUTO-ENTMCNC: 29.6 G/DL (ref 32–36)
MCV RBC AUTO: 87 FL (ref 82–98)
NONHDLC SERPL-MCNC: 96 MG/DL
PLATELET # BLD AUTO: 395 K/UL (ref 150–450)
PMV BLD AUTO: 11 FL (ref 9.2–12.9)
POTASSIUM SERPL-SCNC: 3.8 MMOL/L (ref 3.5–5.1)
PROT SERPL-MCNC: 7.1 G/DL (ref 6–8.4)
RBC # BLD AUTO: 4.3 M/UL (ref 4–5.4)
SATURATED IRON: 9 % (ref 20–50)
SODIUM SERPL-SCNC: 143 MMOL/L (ref 136–145)
TOTAL IRON BINDING CAPACITY: 379 UG/DL (ref 250–450)
TRANSFERRIN SERPL-MCNC: 256 MG/DL (ref 200–375)
TRIGL SERPL-MCNC: 44 MG/DL (ref 30–150)
WBC # BLD AUTO: 7.18 K/UL (ref 3.9–12.7)

## 2023-07-13 PROCEDURE — 3074F PR MOST RECENT SYSTOLIC BLOOD PRESSURE < 130 MM HG: ICD-10-PCS | Mod: CPTII,,, | Performed by: FAMILY MEDICINE

## 2023-07-13 PROCEDURE — 99999 PR PBB SHADOW E&M-EST. PATIENT-LVL III: ICD-10-PCS | Mod: PBBFAC,,, | Performed by: FAMILY MEDICINE

## 2023-07-13 PROCEDURE — 1160F RVW MEDS BY RX/DR IN RCRD: CPT | Mod: CPTII,,, | Performed by: FAMILY MEDICINE

## 2023-07-13 PROCEDURE — 83036 HEMOGLOBIN GLYCOSYLATED A1C: CPT | Performed by: FAMILY MEDICINE

## 2023-07-13 PROCEDURE — 99213 OFFICE O/P EST LOW 20 MIN: CPT | Mod: PBBFAC,PO | Performed by: FAMILY MEDICINE

## 2023-07-13 PROCEDURE — 36415 COLL VENOUS BLD VENIPUNCTURE: CPT | Mod: PO | Performed by: FAMILY MEDICINE

## 2023-07-13 PROCEDURE — 99396 PR PREVENTIVE VISIT,EST,40-64: ICD-10-PCS | Mod: S$PBB,,, | Performed by: FAMILY MEDICINE

## 2023-07-13 PROCEDURE — 1160F PR REVIEW ALL MEDS BY PRESCRIBER/CLIN PHARMACIST DOCUMENTED: ICD-10-PCS | Mod: CPTII,,, | Performed by: FAMILY MEDICINE

## 2023-07-13 PROCEDURE — 85027 COMPLETE CBC AUTOMATED: CPT | Performed by: FAMILY MEDICINE

## 2023-07-13 PROCEDURE — 3008F BODY MASS INDEX DOCD: CPT | Mod: CPTII,,, | Performed by: FAMILY MEDICINE

## 2023-07-13 PROCEDURE — 99999 PR PBB SHADOW E&M-EST. PATIENT-LVL III: CPT | Mod: PBBFAC,,, | Performed by: FAMILY MEDICINE

## 2023-07-13 PROCEDURE — 3079F DIAST BP 80-89 MM HG: CPT | Mod: CPTII,,, | Performed by: FAMILY MEDICINE

## 2023-07-13 PROCEDURE — 99396 PREV VISIT EST AGE 40-64: CPT | Mod: S$PBB,,, | Performed by: FAMILY MEDICINE

## 2023-07-13 PROCEDURE — 1159F PR MEDICATION LIST DOCUMENTED IN MEDICAL RECORD: ICD-10-PCS | Mod: CPTII,,, | Performed by: FAMILY MEDICINE

## 2023-07-13 PROCEDURE — 1159F MED LIST DOCD IN RCRD: CPT | Mod: CPTII,,, | Performed by: FAMILY MEDICINE

## 2023-07-13 PROCEDURE — 80053 COMPREHEN METABOLIC PANEL: CPT | Performed by: FAMILY MEDICINE

## 2023-07-13 PROCEDURE — 80061 LIPID PANEL: CPT | Performed by: FAMILY MEDICINE

## 2023-07-13 PROCEDURE — 3008F PR BODY MASS INDEX (BMI) DOCUMENTED: ICD-10-PCS | Mod: CPTII,,, | Performed by: FAMILY MEDICINE

## 2023-07-13 PROCEDURE — 3074F SYST BP LT 130 MM HG: CPT | Mod: CPTII,,, | Performed by: FAMILY MEDICINE

## 2023-07-13 PROCEDURE — 82728 ASSAY OF FERRITIN: CPT | Performed by: FAMILY MEDICINE

## 2023-07-13 PROCEDURE — 84466 ASSAY OF TRANSFERRIN: CPT | Performed by: FAMILY MEDICINE

## 2023-07-13 PROCEDURE — 3079F PR MOST RECENT DIASTOLIC BLOOD PRESSURE 80-89 MM HG: ICD-10-PCS | Mod: CPTII,,, | Performed by: FAMILY MEDICINE

## 2023-07-13 RX ORDER — FEXOFENADINE HCL 60 MG
60 TABLET ORAL DAILY
Qty: 90 TABLET | Refills: 3 | Status: SHIPPED | OUTPATIENT
Start: 2023-07-13 | End: 2024-07-12

## 2023-07-13 RX ORDER — OMEPRAZOLE 40 MG/1
40 CAPSULE, DELAYED RELEASE ORAL DAILY
Qty: 90 CAPSULE | Refills: 3 | Status: SHIPPED | OUTPATIENT
Start: 2023-07-13 | End: 2023-11-08 | Stop reason: SDUPTHER

## 2023-07-13 NOTE — PROGRESS NOTES
"  Physical Exam  /80   Pulse 73   Temp 98.4 °F (36.9 °C) (Oral)   Ht 5' 2" (1.575 m)   Wt 92.7 kg (204 lb 5.9 oz)   LMP 2023   SpO2 98%   BMI 37.38 kg/m²      Office Visit    Patient Name: Crys Peña    : 1980  MRN: [id number]      Assessment/Plan:  Crys Peña is a 43 y.o. female who presents today for :    Annual physical exam  -     Hemoglobin A1C; Future; Expected date: 2023  -     CBC Without Differential; Future; Expected date: 2023  -     Comprehensive Metabolic Panel; Future; Expected date: 2023  -     Lipid Panel; Future; Expected date: 2023  -     Ferritin; Future  -     Iron and TIBC; Future  -Obesity (BMI 30-39.9)  -Prediabetes  -anticipatory guidance provided with age appropriate preventative services discussed, healthy diet and regular physical exercise also discussed with patient - reviewed w/ patient about the importance of portion control    -Bilateral carpal tunnel syndrome - mild  -avoid the extremes of wrist flexion or extension, repetitive trauma, vibratory tool use  -advised using wrist splints when not working - with intermittent wrist rest, especially during the night to keep wrists in neutral position to relieve Sx  -may use Ibuprofen prn    -Seasonal allergic rhinitis due to other allergic trigger - controlled on PRN Allegra  -     fexofenadine (ALLEGRA) 60 MG tablet; Take 1 tablet (60 mg total) by mouth once daily. Followup Dr.T Melara 2023 for more refills as well as ANNUAL checkup  Dispense: 90 tablet; Refill: 3    Gastritis without hemorrhage  -     omeprazole (PRILOSEC) 40 MG capsule; Take 1 capsule (40 mg total) by mouth once daily.  Dispense: 90 capsule; Refill: 3      Follow up PRN      This note was created by combination of typed  and MModal dictation.  Transcription errors may be present.  If there are any questions, please contact " me.        ----------------------------------------------------------------------------------------------------------------------      HPI:  Patient Care Team:  Shaun Melara MD as PCP - General (Family Medicine)  Myriam Carft MA (Inactive) as Care Coordinator    Crys is a 43 y.o. female with      Patient Active Problem List   Diagnosis    -Anemia    -Obesity (BMI 30-39.9)    Plantar fasciitis of right foot    -Gastritis without bleeding    Seasonal allergic rhinitis    -Prediabetes    -Bilateral carpal tunnel syndrome - mild       Crys presents today for:  Annual Exam          Her last Annual checkup with me was a year ago.  She is doing well and has no major complaints today except for mild carpal tunnel of both hands that mostly affects her at night time. Health maintenance-wise, she is due for routine labs. She is up to date on MMG/Pap screening. She denies any cardiovascular complaints today          Additional ROS    CONST: no fever, no activity change, weight stable.   EYES: no vision change.   ENT: no sore throat. No dysphagia.   CV: no CP with exertion  RESP: no SOB  GI: no N/V/diarrhea/constipation  : no urinary concerns  MSK: see HPI  SKIN: no new rashes  NEURO: no focal deficits.   PSYCH: no new issues.   ENDOCRINE: no polyuria.             Current Medications  Medications reviewed and updated.       Current Outpatient Medications:     fexofenadine (ALLEGRA) 60 MG tablet, Take 1 tablet (60 mg total) by mouth once daily. Followup Dr.T Melara JUNE 2023 for more refills as well as ANNUAL checkup, Disp: 90 tablet, Rfl: 3    omeprazole (PRILOSEC) 40 MG capsule, Take 1 capsule (40 mg total) by mouth once daily., Disp: 90 capsule, Rfl: 3    Past Surgical History:   Procedure Laterality Date    DILATION AND CURETTAGE OF UTERUS      miscarriage       Family History   Problem Relation Age of Onset    Breast cancer Neg Hx     Colon cancer Neg Hx     Ovarian cancer Neg Hx        Social History  "    Socioeconomic History    Marital status: Single   Tobacco Use    Smoking status: Never    Smokeless tobacco: Never   Substance and Sexual Activity    Alcohol use: No    Drug use: No    Sexual activity: Yes     Partners: Male     Birth control/protection: See Surgical Hx     Comment: BTL            Allergies   Review of patient's allergies indicates:  No Known Allergies          Review of Systems  See HPI      [unfilled]  /80   Pulse 73   Temp 98.4 °F (36.9 °C) (Oral)   Ht 5' 2" (1.575 m)   Wt 92.7 kg (204 lb 5.9 oz)   LMP 07/09/2023   SpO2 98%   BMI 37.38 kg/m²     GEN: NAD, well developed, pleasant, well nourished  HEENT: NCAT, PERRLA, EOMI, sclera clear, anicteric, bilateral ear exam wnl, O/P clear, MMM with no lesions  NECK: normal, supple with midline trachea, no LAD, no thyromegaly  LUNGS: CTAB, no w/r/r, no increased work of breathing   HEART: RRR, normal S1 and S2, no m/r/g, no edema  ABD: s/nt/nd, NABS  SKIN: normal turgor, no rashes  PSYCH: AOx3, appropriate mood and affect  MSK: warm/well perfused, normal ROM in all extremities, no c/c/e.  NEURO: normal without focal findings, CN II-XII are grossly intact.  Sensation/strength grossly normal, gait and station normal.         Labs  Lab Results   Component Value Date    HGBA1C 6.0 (H) 04/04/2022     Lab Results   Component Value Date     04/04/2022    K 3.8 04/04/2022     04/04/2022    CO2 25 04/04/2022    BUN 12 04/04/2022    CREATININE 0.9 04/04/2022    CALCIUM 9.3 04/04/2022    ANIONGAP 9 04/04/2022    ESTGFRAFRICA >60.0 04/04/2022    EGFRNONAA >60.0 04/04/2022     Lab Results   Component Value Date    CHOL 138 04/04/2022     Lab Results   Component Value Date    HDL 35 (L) 04/04/2022     Lab Results   Component Value Date    LDLCALC 88.4 04/04/2022     Lab Results   Component Value Date    TRIG 73 04/04/2022     Lab Results   Component Value Date    CHOLHDL 25.4 04/04/2022     Last set of blood work has been reviewed as noted " above.

## 2023-07-14 RX ORDER — FERROUS SULFATE 325(65) MG
325 TABLET ORAL
Qty: 90 TABLET | Refills: 1 | Status: SHIPPED | OUTPATIENT
Start: 2023-07-14

## 2023-10-23 NOTE — OP NOTE
DATE OF PROCEDURE:  09/01/2017    PREOPERATIVE DIAGNOSIS:  Female desiring permanent sterilization.    POSTOPERATIVE DIAGNOSIS:  Female desiring permanent sterilization.    PROCEDURES:  Postpartum tubal ligation via Willapa method.    PRIMARY SURGEON:  Quinton Thakur M.D.    PRIMARY RESIDENT:  Leyla Stone.    ANESTHESIA:  Spinal.    ESTIMATED BLOOD LOSS:  Approximately 10 mL.    COMPLICATIONS:  None.    SPECIMENS:  Bilateral fallopian tubes.    FINDINGS:  The patient had normal-appearing uterus with normal fallopian tubes   bilaterally.    STATEMENT OF MEDICAL NECESSITY:  The patient is a 37-year-old female who desired   permanent sterilization.  Risks, benefits, indications and alternatives were   discussed and the patient wished to proceed.    PROCEDURE IN DETAIL:  After informed consent was obtained, the patient was taken   to the Operating Room, at which time spinal anesthesia was administered.  The   patient was placed in the supine position and prepped and draped in the usual   fashion.  After confirming and appropriate surgical level, a 3 mm skin incision   was made infraumbilically.  The incision was carried down to the fascia.  The   fascia was grasped with Allis and entered sharply using a scalpel.  The   peritoneum was entered without injury to the underlying structures.  The fascial   incision was extended laterally bilaterally using the curved Mathis scissors.    Attention was first taken to the left side, at which time the fallopian tube was   identified.  It was grasped with a Timnath.  It was carried out to the fimbria.    A 4 cm segment of fallopian tube was removed via Willapa method.  The tube   was performed in the ampullary portion of the tube.  After confirming   hemostasis, attention was taken to the right side, at which time the fallopian   tube was clearly identified.  The fimbria were confirmed.  An avascular window   was made under the fallopian tube.  Using a 0 plain gut suture, a 4 cm  segment   of fallopian tube was removed.  After confirming hemostasis, the tubal stumps   were returned to the abdomen.  The fascia was then grasped with Allis.  The   fascia was closed using a 0 Vicryl suture in a running nonlocking fashion.  The   skin was closed using 4-0 Monocryl suture.  The patient was then taken out of   the Operating Room to the recovery area.  All laps, needles, and sponge counts   were correct x2 at the conclusion of the case.  The patient did not receive   preoperative antibiotics.        / [id number] blank(s)        MARZENA/NIKKO  dd: 09/01/2017 08:41:54 (CDT)  td: 09/01/2017 10:22:34 (CDT)  Doc ID   #[id number]  Job ID #[id number]    CC:     Job id [id number]   no

## 2023-11-08 ENCOUNTER — OFFICE VISIT (OUTPATIENT)
Dept: FAMILY MEDICINE | Facility: CLINIC | Age: 43
End: 2023-11-08
Payer: MEDICAID

## 2023-11-08 VITALS
WEIGHT: 203.5 LBS | TEMPERATURE: 98 F | HEART RATE: 80 BPM | DIASTOLIC BLOOD PRESSURE: 80 MMHG | SYSTOLIC BLOOD PRESSURE: 120 MMHG | OXYGEN SATURATION: 97 % | BODY MASS INDEX: 37.22 KG/M2

## 2023-11-08 DIAGNOSIS — K29.60 OTHER GASTRITIS WITHOUT HEMORRHAGE, UNSPECIFIED CHRONICITY: ICD-10-CM

## 2023-11-08 DIAGNOSIS — R73.03 PREDIABETES: ICD-10-CM

## 2023-11-08 DIAGNOSIS — Z23 NEEDS FLU SHOT: ICD-10-CM

## 2023-11-08 DIAGNOSIS — E66.9 OBESITY (BMI 30-39.9): Primary | ICD-10-CM

## 2023-11-08 PROCEDURE — 1159F MED LIST DOCD IN RCRD: CPT | Mod: CPTII,,, | Performed by: NURSE PRACTITIONER

## 2023-11-08 PROCEDURE — 99999PBSHW FLU VACCINE (QUAD) GREATER THAN OR EQUAL TO 3YO PRESERVATIVE FREE IM: Mod: PBBFAC,,,

## 2023-11-08 PROCEDURE — 3044F PR MOST RECENT HEMOGLOBIN A1C LEVEL <7.0%: ICD-10-PCS | Mod: CPTII,,, | Performed by: NURSE PRACTITIONER

## 2023-11-08 PROCEDURE — 3008F PR BODY MASS INDEX (BMI) DOCUMENTED: ICD-10-PCS | Mod: CPTII,,, | Performed by: NURSE PRACTITIONER

## 2023-11-08 PROCEDURE — 99214 PR OFFICE/OUTPT VISIT, EST, LEVL IV, 30-39 MIN: ICD-10-PCS | Mod: S$PBB,,, | Performed by: NURSE PRACTITIONER

## 2023-11-08 PROCEDURE — 3008F BODY MASS INDEX DOCD: CPT | Mod: CPTII,,, | Performed by: NURSE PRACTITIONER

## 2023-11-08 PROCEDURE — 1159F PR MEDICATION LIST DOCUMENTED IN MEDICAL RECORD: ICD-10-PCS | Mod: CPTII,,, | Performed by: NURSE PRACTITIONER

## 2023-11-08 PROCEDURE — 3079F PR MOST RECENT DIASTOLIC BLOOD PRESSURE 80-89 MM HG: ICD-10-PCS | Mod: CPTII,,, | Performed by: NURSE PRACTITIONER

## 2023-11-08 PROCEDURE — 99214 OFFICE O/P EST MOD 30 MIN: CPT | Mod: S$PBB,,, | Performed by: NURSE PRACTITIONER

## 2023-11-08 PROCEDURE — 99999 PR PBB SHADOW E&M-EST. PATIENT-LVL IV: ICD-10-PCS | Mod: PBBFAC,,, | Performed by: NURSE PRACTITIONER

## 2023-11-08 PROCEDURE — 99214 OFFICE O/P EST MOD 30 MIN: CPT | Mod: PBBFAC,PO | Performed by: NURSE PRACTITIONER

## 2023-11-08 PROCEDURE — 90471 IMMUNIZATION ADMIN: CPT | Mod: PBBFAC,PO

## 2023-11-08 PROCEDURE — 3044F HG A1C LEVEL LT 7.0%: CPT | Mod: CPTII,,, | Performed by: NURSE PRACTITIONER

## 2023-11-08 PROCEDURE — 99999 PR PBB SHADOW E&M-EST. PATIENT-LVL IV: CPT | Mod: PBBFAC,,, | Performed by: NURSE PRACTITIONER

## 2023-11-08 PROCEDURE — 3074F PR MOST RECENT SYSTOLIC BLOOD PRESSURE < 130 MM HG: ICD-10-PCS | Mod: CPTII,,, | Performed by: NURSE PRACTITIONER

## 2023-11-08 PROCEDURE — 3079F DIAST BP 80-89 MM HG: CPT | Mod: CPTII,,, | Performed by: NURSE PRACTITIONER

## 2023-11-08 PROCEDURE — 3074F SYST BP LT 130 MM HG: CPT | Mod: CPTII,,, | Performed by: NURSE PRACTITIONER

## 2023-11-08 PROCEDURE — 99999PBSHW FLU VACCINE (QUAD) GREATER THAN OR EQUAL TO 3YO PRESERVATIVE FREE IM: ICD-10-PCS | Mod: PBBFAC,,,

## 2023-11-08 RX ORDER — OMEPRAZOLE 40 MG/1
40 CAPSULE, DELAYED RELEASE ORAL DAILY
Qty: 90 CAPSULE | Refills: 3 | Status: SHIPPED | OUTPATIENT
Start: 2023-11-08

## 2023-11-08 NOTE — PATIENT INSTRUCTIONS
Medical Fitness--[phone]  Imaging, Xray, CT, MRI, Ultrasound---[phone]  Bariatrics---[phone]  Breast Surgery---[phone]  Case Management---[phone]  Colonoscopy---[phone]  DME---[phone]  Infectious Disease---[phone]  Interventional Radiology---[phone]  Medical Records---[phone]  [facility] On Call---[phone]  Optometry/Ophthalmology---[phone]  [facility]---[phone]  Physical Therapy---[phone]  Psychiatry---[phone] or [phone]  Plastic Surgery---[phone]  Recovery--[phone] option 2, or [phone].  Sleep Study---[phone]  Smoking Cessation---[phone]  Wound Care---[phone]  Referral Desk---[phone]

## 2023-11-08 NOTE — PROGRESS NOTES
HPI     Chief Complaint:  Chief Complaint   Patient presents with    Gastroesophageal Reflux       Crys Peña is a 43 y.o. female with multiple medical diagnoses as listed in the medical history and problem list that presents for GERD.  Pt is new to me but is known to this clinic with her last appointment being 2023.      Gastroesophageal Reflux  She complains of abdominal pain (mild, intermittent) and heartburn. She reports no belching, no chest pain, no choking, no coughing, no dysphagia, no hoarse voice, no nausea, no sore throat or no wheezing. This is a chronic problem. The current episode started more than 1 year ago. The problem occurs occasionally. The problem has been waxing and waning. The heartburn duration is an hour. The heartburn is located in the substernum. The heartburn is of mild intensity. The heartburn does not wake her from sleep. The heartburn does not limit her activity. The heartburn changes with position. The symptoms are aggravated by certain foods, lying down and caffeine. Risk factors include caffeine use, lack of exercise and obesity. She has tried a PPI and an antacid for the symptoms. The treatment provided moderate relief.       Assessment & Plan     Problem List Items Addressed This Visit          Endocrine    -Obesity (BMI 30-39.9) - Primary    We discussed weight issues and safe, effective ways of losing pounds, includin) diet:  low carbohydrate, low fat diet, stay away from fast food, fried and processed food, use whole grain, lot of fruits and vegetables, use healthy fat such as avocado, nuts and olive oil in reasonable quantity, stay away from sodas. Regular meals with lean proteins.  2) physical activity: ideally 150 min a week, with cardiovascular and resistance activity.  Patient was encouraged to set realistic attainable goals for weight loss, and we will follow up periodically.    Discussed Mediterranean Diet recommendations (Adopted from Asim et al,  [facility], 2018.)  - Eat primarily plant-based foods, such as fruits and vegetables, whole grains, legumes (beans) and nuts  - Limit refined carbohydrates (white pasta, bread, rice).  - Replace butter with healthy fats such as olive oil.  - Use herbs and spices instead of salt to flavor foods.  - Limit red meat and processed meats to no more than a few times a month.  - Avoid sugary sodas, bakery goods, and sweets.  - Eat fish and poultry at least twice a week.    Refer to bariatric medicine.    Relevant Orders    Ambulatory referral/consult to Bariatric Medicine    -Prediabetes    Patient does have a Hx of prediabtes, which we discussed increased risk for diabetes in the future, therefore, I recommend dietary modification such as lowering carbohydrates in diet (pasta, rice, bread, potatoes, crackers, cookies, candy, soda, etc.) to decrease the risk of progression to diabetes.             GI    -Gastritis without bleeding    Non compliant with diet and exercise  -discussed with patient about avoiding potential dietary triggers  -avoid spicy/greasy/sour/acidic foods, as well as tea/coffee/chocolate if possible  -Tylenol as needed for pain, avoid NSAIDs  -Keep food diary  Weight loss  Exercise  Refilled medication.         Relevant Medications    omeprazole (PRILOSEC) 40 MG capsule     4. Needs flu shot  Influenza - Quadrivalent (PF)          --------------------------------------------      Health Maintenance:  Health Maintenance         Date Due Completion Date    Influenza Vaccine (1) 09/01/2023 8/6/2019    COVID-19 Vaccine (3 - 2023-24 season) 09/01/2023 4/9/2021    Mammogram 06/28/2024 6/28/2023    Hemoglobin A1c (Prediabetes) 07/13/2024 7/13/2023    Cervical Cancer Screening 01/29/2025 1/29/2020    TETANUS VACCINE 07/17/2027 7/17/2017            Advised patient on the importance of completing overdue health maintenance items and Flu vaccine ordered    Follow Up:  Follow up in about 2 weeks (around 11/22/2023), or if  symptoms worsen or fail to improve.    Exam     Review of Systems:  (as noted above)  Review of Systems   Constitutional:  Negative for fever.   HENT:  Negative for hoarse voice, sore throat and trouble swallowing.    Eyes:  Negative for visual disturbance.   Respiratory:  Negative for cough, choking, chest tightness, shortness of breath and wheezing.    Cardiovascular:  Negative for chest pain.   Gastrointestinal:  Positive for abdominal pain (mild, intermittent) and heartburn. Negative for blood in stool, dysphagia and nausea.       Physical Exam:   Physical Exam  Constitutional:       General: She is not in acute distress.     Appearance: She is obese. She is not ill-appearing or diaphoretic.   HENT:      Head: Normocephalic and atraumatic.   Cardiovascular:      Rate and Rhythm: Normal rate and regular rhythm.      Heart sounds: No murmur heard.     No friction rub. No gallop.   Pulmonary:      Effort: No respiratory distress.   Chest:      Chest wall: No tenderness.   Musculoskeletal:      Cervical back: No rigidity.   Skin:     Capillary Refill: Capillary refill takes 2 to 3 seconds.   Neurological:      General: No focal deficit present.      Mental Status: She is alert and oriented to person, place, and time.       Vitals:    11/08/23 1500   BP: 120/80   BP Location: Left arm   Patient Position: Sitting   BP Method: Large (Manual)   Pulse: 80   Temp: 98.4 °F (36.9 °C)   TempSrc: Oral   SpO2: 97%   Weight: 92.3 kg (203 lb 7.8 oz)      Body mass index is 37.22 kg/m².        History     Past Medical History:  Past Medical History:   Diagnosis Date    Pregnancy        Past Surgical History:  Past Surgical History:   Procedure Laterality Date    DILATION AND CURETTAGE OF UTERUS      miscarriage       Social History:  Social History     Socioeconomic History    Marital status: Single   Tobacco Use    Smoking status: Never    Smokeless tobacco: Never   Substance and Sexual Activity    Alcohol use: No    Drug use:  No    Sexual activity: Yes     Partners: Male     Birth control/protection: See Surgical Hx     Comment: BTL      Social Determinants of Health     Food Insecurity: No Food Insecurity (9/15/2020)    Hunger Vital Sign     Worried About Running Out of Food in the Last Year: Never true     Ran Out of Food in the Last Year: Never true   Transportation Needs: No Transportation Needs (9/15/2020)    PRAPARE - Transportation     Lack of Transportation (Medical): No     Lack of Transportation (Non-Medical): No   Physical Activity: Unknown (9/15/2020)    Exercise Vital Sign     Days of Exercise per Week: 5 days   Stress: Stress Concern Present (9/15/2020)    [employer] - Occupational Stress Questionnaire     Feeling of Stress : To some extent   Social Connections: Unknown (9/15/2020)    Social Connection and Isolation Panel [NHANES]     Frequency of Communication with Friends and Family: More than three times a week     Frequency of Social Gatherings with Friends and Family: Once a week     Active Member of Clubs or Organizations: No     Attends Club or Organization Meetings: Never     Marital Status: Living with partner       Family History:  Family History   Problem Relation Age of Onset    Breast cancer Neg Hx     Colon cancer Neg Hx     Ovarian cancer Neg Hx        Allergies and Medications: (updated and reviewed)  Review of patient's allergies indicates:  No Known Allergies  Current Outpatient Medications   Medication Sig Dispense Refill    ferrous sulfate (IRON, FERROUS SULFATE,) 325 mg (65 mg iron) Tab tablet Take 1 tablet (325 mg total) by mouth daily with breakfast. Followup Dr.T Melara OCTOBER 2023 for more refills, call to schedule/get labs 1wk before doctor's appointment (ordered). May schedule a VIDEO or TELEPHONE visit if desired 90 tablet 1    fexofenadine (ALLEGRA) 60 MG tablet Take 1 tablet (60 mg total) by mouth once daily. Followup Dr.T Melara JUNE 2023 for more refills as well  as ANNUAL checkup (Patient not taking: Reported on 11/8/2023) 90 tablet 3    omeprazole (PRILOSEC) 40 MG capsule Take 1 capsule (40 mg total) by mouth once daily. 90 capsule 3     No current facility-administered medications for this visit.       Patient Care Team:  Shaun Melara MD as PCP - General (Family Medicine)  Myriam Craft MA (Inactive) as Care Coordinator         - The patient is given an After Visit Summary that lists all medications with directions, allergies, education, orders placed during this encounter and follow-up instructions.      - I have reviewed the patient's medical information including past medical, family, and social history sections including the medications and allergies.      - We discussed the patient's current medications.     This note was created by combination of typed  and MModal dictation.  Transcription errors may be present.  If there are any questions, please contact me.       Quinten Mirza NP

## 2024-04-05 ENCOUNTER — OFFICE VISIT (OUTPATIENT)
Dept: FAMILY MEDICINE | Facility: CLINIC | Age: 44
End: 2024-04-05
Payer: MEDICAID

## 2024-04-05 VITALS
WEIGHT: 213.94 LBS | TEMPERATURE: 99 F | SYSTOLIC BLOOD PRESSURE: 110 MMHG | OXYGEN SATURATION: 99 % | DIASTOLIC BLOOD PRESSURE: 80 MMHG | BODY MASS INDEX: 39.13 KG/M2 | HEART RATE: 72 BPM

## 2024-04-05 DIAGNOSIS — Z00.00 ANNUAL PHYSICAL EXAM: ICD-10-CM

## 2024-04-05 DIAGNOSIS — Z12.31 ENCOUNTER FOR SCREENING MAMMOGRAM FOR MALIGNANT NEOPLASM OF BREAST: ICD-10-CM

## 2024-04-05 DIAGNOSIS — M25.541 ARTHRALGIA OF RIGHT HAND: ICD-10-CM

## 2024-04-05 DIAGNOSIS — D50.8 IRON DEFICIENCY ANEMIA SECONDARY TO INADEQUATE DIETARY IRON INTAKE: ICD-10-CM

## 2024-04-05 DIAGNOSIS — G56.01 CARPAL TUNNEL SYNDROME OF RIGHT WRIST: Primary | ICD-10-CM

## 2024-04-05 PROCEDURE — 99214 OFFICE O/P EST MOD 30 MIN: CPT | Mod: S$PBB,,, | Performed by: FAMILY MEDICINE

## 2024-04-05 PROCEDURE — 1159F MED LIST DOCD IN RCRD: CPT | Mod: CPTII,,, | Performed by: FAMILY MEDICINE

## 2024-04-05 PROCEDURE — 99999 PR PBB SHADOW E&M-EST. PATIENT-LVL III: CPT | Mod: PBBFAC,,, | Performed by: FAMILY MEDICINE

## 2024-04-05 PROCEDURE — 3079F DIAST BP 80-89 MM HG: CPT | Mod: CPTII,,, | Performed by: FAMILY MEDICINE

## 2024-04-05 PROCEDURE — 99213 OFFICE O/P EST LOW 20 MIN: CPT | Mod: PBBFAC,PO | Performed by: FAMILY MEDICINE

## 2024-04-05 PROCEDURE — 1160F RVW MEDS BY RX/DR IN RCRD: CPT | Mod: CPTII,,, | Performed by: FAMILY MEDICINE

## 2024-04-05 PROCEDURE — 3074F SYST BP LT 130 MM HG: CPT | Mod: CPTII,,, | Performed by: FAMILY MEDICINE

## 2024-04-05 PROCEDURE — 3008F BODY MASS INDEX DOCD: CPT | Mod: CPTII,,, | Performed by: FAMILY MEDICINE

## 2024-04-05 RX ORDER — NAPROXEN 500 MG/1
500 TABLET ORAL 2 TIMES DAILY WITH MEALS
Qty: 30 TABLET | Refills: 1 | Status: SHIPPED | OUTPATIENT
Start: 2024-04-05

## 2024-04-05 NOTE — PROGRESS NOTES
Physical Exam  /80 (BP Location: Left arm, Patient Position: Sitting, BP Method: Large (Manual))   Pulse 72   Temp 99.3 °F (37.4 °C) (Oral)   Wt 97 kg (213 lb 15.3 oz)   SpO2 99%   BMI 39.13 kg/m²      Office Visit    Patient Name: Crys Peña    : 1980  MRN: [id number]      Assessment/Plan:  Crys Peña is a 44 y.o. female who presents today for :    Carpal tunnel syndrome of right wrist  -     naproxen (NAPROSYN) 500 MG tablet; Take 1 tablet (500 mg total) by mouth 2 (two) times daily with meals.  Dispense: 30 tablet; Refill: 1  -mild symptoms - avoid the extremes of wrist flexion or extension, repetitive trauma, vibratory tool use  -advised using wrist splints when not working - with intermittent wrist rest, especially during the night to keep wrists in neutral position to relieve Sx. Discussed PRN NSAIDs, may add Tylenol as needed for pain  -if conservative therapy doesn't work, I discuss w/ pt that pt may RTC in 4 weeks for f/u and discuss possible need for joint injection or Ortho referral, pt voices understanding    Encounter for screening mammogram for malignant neoplasm of breast  -     Mammo Digital Screening Bilat w/ [facility]; Future; Expected date: 2024        Follow up PRN        This note was created by combination of typed  and MModal dictation.  Transcription errors may be present.  If there are any questions, please contact me.        ----------------------------------------------------------------------------------------------------------------------      HPI:  Patient Care Team:  Shaun Melara MD as PCP - General (Family Medicine)  Myriam Craft MA (Inactive) as Care Coordinator    Crys is a 44 y.o. female with      Patient Active Problem List   Diagnosis    -Anemia    -Obesity (BMI 30-39.9)    Plantar fasciitis of right foot    -Gastritis without bleeding    Seasonal allergic rhinitis    -Prediabetes    -Bilateral carpal tunnel syndrome -  marynane Spangler presents today for Right hand pain/tingling the past month. She does have a Hx of mild carpal tunnel in the same wrist years ago, but thinks it's coming back. 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 She states she feels tingling at the tips of her fingers, with pain and stiffness in her the palm of her hand - sometimes alleviated with rest and/or shaking her wrist. She has not used a wrist brace for some time. Otherwise, no other acute issues during this visit. No weakness/pain radiation        Additional ROS      Negative review of system    except per HPI             Patient Active Problem List   Diagnosis    -Anemia    -Obesity (BMI 30-39.9)    Plantar fasciitis of right foot    -Gastritis without bleeding    Seasonal allergic rhinitis    -Prediabetes    -Bilateral carpal tunnel syndrome - mild       Current Medications  Medications reviewed/updated.     Current Outpatient Medications on File Prior to Visit   Medication Sig Dispense Refill    ferrous sulfate (IRON, FERROUS SULFATE,) 325 mg (65 mg iron) Tab tablet Take 1 tablet (325 mg total) by mouth daily with breakfast. Followup Dr.T Melara OCTOBER 2023 for more refills, call to schedule/get labs 1wk before doctor's appointment (ordered). May schedule a VIDEO or TELEPHONE visit if desired 90 tablet 1    omeprazole (PRILOSEC) 40 MG capsule Take 1 capsule (40 mg total) by mouth once daily. 90 capsule 3    fexofenadine (ALLEGRA) 60 MG tablet Take 1 tablet (60 mg total) by mouth once daily. Followup Dr.T Melara JUNE 2023 for more refills as well as ANNUAL checkup (Patient not taking: Reported on 11/8/2023) 90 tablet 3     No current facility-administered medications on file prior to visit.       Past Surgical History:   Procedure Laterality Date    DILATION AND CURETTAGE OF UTERUS      miscarriage       Family History   Problem Relation Age of Onset    Breast cancer Neg Hx      Colon cancer Neg Hx     Ovarian cancer Neg Hx        Social History     Socioeconomic History    Marital status: Single   Tobacco Use    Smoking status: Never    Smokeless tobacco: Never   Substance and Sexual Activity    Alcohol use: No    Drug use: No    Sexual activity: Yes     Partners: Male     Birth control/protection: See Surgical Hx     Comment: BTL      Social Determinants of Health     Food Insecurity: No Food Insecurity (9/15/2020)    Hunger Vital Sign     Worried About Running Out of Food in the Last Year: Never true     Ran Out of Food in the Last Year: Never true   Transportation Needs: No Transportation Needs (9/15/2020)    PRAPARE - Transportation     Lack of Transportation (Medical): No     Lack of Transportation (Non-Medical): No   Physical Activity: Unknown (9/15/2020)    Exercise Vital Sign     Days of Exercise per Week: 5 days   Stress: Stress Concern Present (9/15/2020)    [employer] - Occupational Stress Questionnaire     Feeling of Stress : To some extent   Social Connections: Unknown (9/15/2020)    Social Connection and Isolation Panel [NHANES]     Frequency of Communication with Friends and Family: More than three times a week     Frequency of Social Gatherings with Friends and Family: Once a week     Active Member of Clubs or Organizations: No     Attends Club or Organization Meetings: Never     Marital Status: Living with partner           Allergies   Review of patient's allergies indicates:  No Known Allergies          Review of Systems  See HPI      [unfilled]  /80 (BP Location: Left arm, Patient Position: Sitting, BP Method: Large (Manual))   Pulse 72   Temp 99.3 °F (37.4 °C) (Oral)   Wt 97 kg (213 lb 15.3 oz)   SpO2 99%   BMI 39.13 kg/m²     GEN: NAD, pleasant  HEENT: NCAT, PERRLA, EOMI, sclera clear, anicteric  NECK: normal, supple  SKIN: normal turgor, no rashes  PSYCH: AOx3, appropriate mood and affect  MSK: warm/well perfused, normal ROM in  all extremities, no c/c/e.  Normal gross appearance of b/l hands. No thenar nor hypothenar atrophy noted. Minimal +Phalen's and +Tinel's on bilateral wrist exam with normal  ROM. No swelling nor redness nor warmth to touch.

## 2024-04-12 ENCOUNTER — PATIENT MESSAGE (OUTPATIENT)
Dept: ADMINISTRATIVE | Facility: CLINIC | Age: 44
End: 2024-04-12
Payer: MEDICAID

## 2024-04-12 ENCOUNTER — TELEPHONE (OUTPATIENT)
Dept: ADMINISTRATIVE | Facility: CLINIC | Age: 44
End: 2024-04-12
Payer: MEDICAID

## 2024-04-12 NOTE — TELEPHONE ENCOUNTER
L/V to inform the patient about 6/28/24 appointment needs to be rescheduled, Dr. Melara will not be in the office on that day.  Requested the patient to call the office back be rescheduled.

## 2024-05-16 DIAGNOSIS — K29.60 OTHER GASTRITIS WITHOUT HEMORRHAGE, UNSPECIFIED CHRONICITY: ICD-10-CM

## 2024-05-21 RX ORDER — OMEPRAZOLE 40 MG/1
40 CAPSULE, DELAYED RELEASE ORAL DAILY
Qty: 60 CAPSULE | Refills: 0 | Status: SHIPPED | OUTPATIENT
Start: 2024-05-21

## 2024-08-09 ENCOUNTER — HOSPITAL ENCOUNTER (EMERGENCY)
Facility: HOSPITAL | Age: 44
Discharge: HOME | End: 2024-08-09
Attending: EMERGENCY MEDICINE
Payer: COMMERCIAL

## 2024-08-09 VITALS
HEIGHT: 62 IN | WEIGHT: 205 LBS | BODY MASS INDEX: 37.73 KG/M2 | OXYGEN SATURATION: 100 % | HEART RATE: 71 BPM | SYSTOLIC BLOOD PRESSURE: 128 MMHG | TEMPERATURE: 98 F | DIASTOLIC BLOOD PRESSURE: 77 MMHG | RESPIRATION RATE: 18 BRPM

## 2024-08-09 DIAGNOSIS — R07.89 CHEST DISCOMFORT: ICD-10-CM

## 2024-08-09 DIAGNOSIS — K29.60 OTHER GASTRITIS WITHOUT HEMORRHAGE, UNSPECIFIED CHRONICITY: ICD-10-CM

## 2024-08-09 DIAGNOSIS — R07.9 CHEST PAIN: ICD-10-CM

## 2024-08-09 LAB
ALBUMIN SERPL BCP-MCNC: 3.6 G/DL (ref 3.5–5.2)
ALP SERPL-CCNC: 54 U/L (ref 55–135)
ALT SERPL W/O P-5'-P-CCNC: 11 U/L (ref 10–44)
ANION GAP SERPL CALC-SCNC: 8 MMOL/L (ref 8–16)
AST SERPL-CCNC: 18 U/L (ref 10–40)
B-HCG UR QL: NEGATIVE
BASOPHILS # BLD AUTO: 0.07 K/UL (ref 0–0.2)
BASOPHILS NFR BLD: 0.7 % (ref 0–1.9)
BILIRUB SERPL-MCNC: 0.3 MG/DL (ref 0.1–1)
BNP SERPL-MCNC: <10 PG/ML (ref 0–99)
BUN SERPL-MCNC: 11 MG/DL (ref 6–20)
CALCIUM SERPL-MCNC: 9.4 MG/DL (ref 8.7–10.5)
CHLORIDE SERPL-SCNC: 108 MMOL/L (ref 95–110)
CO2 SERPL-SCNC: 23 MMOL/L (ref 23–29)
CREAT SERPL-MCNC: 0.8 MG/DL (ref 0.5–1.4)
CTP QC/QA: YES
DIFFERENTIAL METHOD BLD: ABNORMAL
EOSINOPHIL # BLD AUTO: 0.2 K/UL (ref 0–0.5)
EOSINOPHIL NFR BLD: 1.7 % (ref 0–8)
ERYTHROCYTE [DISTWIDTH] IN BLOOD BY AUTOMATED COUNT: 15.8 % (ref 11.5–14.5)
EST. GFR  (NO RACE VARIABLE): >60 ML/MIN/1.73 M^2
GLUCOSE SERPL-MCNC: 100 MG/DL (ref 70–110)
HCT VFR BLD AUTO: 36.7 % (ref 37–48.5)
HGB BLD-MCNC: 11.1 G/DL (ref 12–16)
IMM GRANULOCYTES # BLD AUTO: 0.02 K/UL (ref 0–0.04)
IMM GRANULOCYTES NFR BLD AUTO: 0.2 % (ref 0–0.5)
LIPASE SERPL-CCNC: 38 U/L (ref 4–60)
LYMPHOCYTES # BLD AUTO: 2.6 K/UL (ref 1–4.8)
LYMPHOCYTES NFR BLD: 27.7 % (ref 18–48)
MCH RBC QN AUTO: 24.4 PG (ref 27–31)
MCHC RBC AUTO-ENTMCNC: 30.2 G/DL (ref 32–36)
MCV RBC AUTO: 81 FL (ref 82–98)
MONOCYTES # BLD AUTO: 0.8 K/UL (ref 0.3–1)
MONOCYTES NFR BLD: 7.9 % (ref 4–15)
NEUTROPHILS # BLD AUTO: 5.9 K/UL (ref 1.8–7.7)
NEUTROPHILS NFR BLD: 61.8 % (ref 38–73)
NRBC BLD-RTO: 0 /100 WBC
PLATELET # BLD AUTO: 401 K/UL (ref 150–450)
PMV BLD AUTO: 9.9 FL (ref 9.2–12.9)
POTASSIUM SERPL-SCNC: 3.9 MMOL/L (ref 3.5–5.1)
PROT SERPL-MCNC: 7.8 G/DL (ref 6–8.4)
RBC # BLD AUTO: 4.54 M/UL (ref 4–5.4)
SODIUM SERPL-SCNC: 139 MMOL/L (ref 136–145)
TROPONIN I SERPL DL<=0.01 NG/ML-MCNC: <0.006 NG/ML (ref 0–0.03)
WBC # BLD AUTO: 9.48 K/UL (ref 3.9–12.7)

## 2024-08-09 PROCEDURE — 93005 ELECTROCARDIOGRAM TRACING: CPT

## 2024-08-09 PROCEDURE — 80053 COMPREHEN METABOLIC PANEL: CPT | Performed by: NURSE PRACTITIONER

## 2024-08-09 PROCEDURE — 85025 COMPLETE CBC W/AUTO DIFF WBC: CPT | Performed by: NURSE PRACTITIONER

## 2024-08-09 PROCEDURE — 93010 ELECTROCARDIOGRAM REPORT: CPT | Mod: ,,, | Performed by: INTERNAL MEDICINE

## 2024-08-09 PROCEDURE — 84484 ASSAY OF TROPONIN QUANT: CPT | Performed by: NURSE PRACTITIONER

## 2024-08-09 PROCEDURE — 99285 EMERGENCY DEPT VISIT HI MDM: CPT | Mod: 25

## 2024-08-09 PROCEDURE — 25000003 PHARM REV CODE 250: Performed by: EMERGENCY MEDICINE

## 2024-08-09 PROCEDURE — 83880 ASSAY OF NATRIURETIC PEPTIDE: CPT | Performed by: NURSE PRACTITIONER

## 2024-08-09 PROCEDURE — 83690 ASSAY OF LIPASE: CPT | Performed by: EMERGENCY MEDICINE

## 2024-08-09 PROCEDURE — 81025 URINE PREGNANCY TEST: CPT | Performed by: NURSE PRACTITIONER

## 2024-08-09 RX ORDER — OMEPRAZOLE 40 MG/1
40 CAPSULE, DELAYED RELEASE ORAL DAILY
Qty: 60 CAPSULE | Refills: 0 | Status: SHIPPED | OUTPATIENT
Start: 2024-08-09

## 2024-08-09 RX ORDER — LIDOCAINE HYDROCHLORIDE 20 MG/ML
15 SOLUTION OROPHARYNGEAL ONCE
Status: COMPLETED | OUTPATIENT
Start: 2024-08-09 | End: 2024-08-09

## 2024-08-09 RX ORDER — ALUMINUM HYDROXIDE, MAGNESIUM HYDROXIDE, AND SIMETHICONE 1200; 120; 1200 MG/30ML; MG/30ML; MG/30ML
30 SUSPENSION ORAL ONCE
Status: COMPLETED | OUTPATIENT
Start: 2024-08-09 | End: 2024-08-09

## 2024-08-09 RX ORDER — PANTOPRAZOLE SODIUM 40 MG/1
40 TABLET, DELAYED RELEASE ORAL
Status: COMPLETED | OUTPATIENT
Start: 2024-08-09 | End: 2024-08-09

## 2024-08-09 RX ADMIN — LIDOCAINE HYDROCHLORIDE 15 ML: 20 SOLUTION ORAL at 02:08

## 2024-08-09 RX ADMIN — PANTOPRAZOLE SODIUM 40 MG: 40 TABLET, DELAYED RELEASE ORAL at 02:08

## 2024-08-09 RX ADMIN — ALUMINUM HYDROXIDE, MAGNESIUM HYDROXIDE, AND SIMETHICONE 30 ML: 200; 200; 20 SUSPENSION ORAL at 02:08

## 2024-08-09 NOTE — ED PROVIDER NOTES
"Encounter Date: 8/9/2024    SCRIBE #1 NOTE: I, Pat Giang, am scribing for, and in the presence of,  Millicent Fagan MD. I have scribed the following portions of the note - Other sections scribed: HPI, ROS, PE.       History     Chief Complaint   Patient presents with    Abdominal Pain     Pt c/o abdominal pain accompanied by burning sensation as well as mid sternal chest pain which radiates to left shoulder, presenting 1 hour PTA. Pt states she was lying down when her symptoms presented. Pt denies blurred vision, slurred speech, difficulty ambulating or abnormalities.      44 y.o. female with a PMHx of gastritis, presents to the ED with abdominal pain and mid-sternal chest pain since 3 am this morning. Patient reports she has a history of "stomach problems," and began having the abdominal pain early this morning. States that then at 7 am she was sitting down and began experincing mid-sternal chest pain she describes as "burning" radiating to her left shoulder and upper left arm. States she also experinced a tingling sensation in her left hand and multiple episodes of of diarrhea. Notes she had a similar episode of tingling in her bilateral hands a few weeks ago that lasted 45 minutes then resolved on its own. States currently that her symptoms are more mild than they were but are still consistent, also endorsing a mild, intermittent headache. Notes she has an appointment with her PCP tomorrow and had been out of her prilosec for 1 day.     The history is provided by the patient. No  was used.     Review of patient's allergies indicates:  No Known Allergies  Past Medical History:   Diagnosis Date    Pregnancy      Past Surgical History:   Procedure Laterality Date    DILATION AND CURETTAGE OF UTERUS      miscarriage     Family History   Problem Relation Name Age of Onset    Breast cancer Neg Hx      Colon cancer Neg Hx      Ovarian cancer Neg Hx       Social History     Tobacco Use    " Smoking status: Never    Smokeless tobacco: Never   Substance Use Topics    Alcohol use: No    Drug use: No     Review of Systems   Constitutional:  Negative for chills and fever.   HENT:  Negative for congestion and sore throat.    Eyes:  Negative for visual disturbance.   Respiratory:  Negative for cough and shortness of breath.    Cardiovascular:  Positive for chest pain (mid-sternal).   Gastrointestinal:  Positive for abdominal pain and diarrhea. Negative for nausea and vomiting.   Genitourinary:  Negative for dysuria.   Musculoskeletal:  Positive for myalgias (left shoulder, upper left arm).   Skin:  Negative for rash.   Neurological:  Positive for headaches.        (+) Left hand paraesthesias.    Psychiatric/Behavioral:  Negative for decreased concentration.        Physical Exam     Initial Vitals [08/09/24 1137]   BP Pulse Resp Temp SpO2   137/86 63 16 97.8 °F (36.6 °C) 99 %      MAP       --         Physical Exam    Nursing note and vitals reviewed.  Constitutional: She appears well-developed and well-nourished. She is not diaphoretic. No distress.   HENT:   Head: Normocephalic and atraumatic.   Mouth/Throat: Oropharynx is clear and moist.   Eyes: Pupils are equal, round, and reactive to light.   Neck: Neck supple.   Cardiovascular:  Normal rate and regular rhythm.           Pulses:       Radial pulses are 2+ on the right side and 2+ on the left side.   Pulmonary/Chest: Breath sounds normal.   Abdominal: Abdomen is soft. There is no abdominal tenderness.   Musculoskeletal:         General: No edema.      Cervical back: Neck supple.     Neurological: She is alert and oriented to person, place, and time.   5/5  strength bilaterally, sensation to light touch intact bilateral upper extremities   Skin: Skin is warm and dry.   Psychiatric: She has a normal mood and affect.         ED Course   Procedures  Labs Reviewed   CBC W/ AUTO DIFFERENTIAL - Abnormal       Result Value    WBC 9.48      RBC 4.54       Hemoglobin 11.1 (*)     Hematocrit 36.7 (*)     MCV 81 (*)     MCH 24.4 (*)     MCHC 30.2 (*)     RDW 15.8 (*)     Platelets 401      MPV 9.9      Immature Granulocytes 0.2      Gran # (ANC) 5.9      Immature Grans (Abs) 0.02      Lymph # 2.6      Mono # 0.8      Eos # 0.2      Baso # 0.07      nRBC 0      Gran % 61.8      Lymph % 27.7      Mono % 7.9      Eosinophil % 1.7      Basophil % 0.7      Differential Method Automated     COMPREHENSIVE METABOLIC PANEL - Abnormal    Sodium 139      Potassium 3.9      Chloride 108      CO2 23      Glucose 100      BUN 11      Creatinine 0.8      Calcium 9.4      Total Protein 7.8      Albumin 3.6      Total Bilirubin 0.3      Alkaline Phosphatase 54 (*)     AST 18      ALT 11      eGFR >60      Anion Gap 8     TROPONIN I    Troponin I <0.006     B-TYPE NATRIURETIC PEPTIDE    BNP <10     LIPASE    Lipase 38     POCT URINE PREGNANCY    POC Preg Test, Ur Negative       Acceptable Yes          ECG Results              EKG 12-lead (Preliminary result)  Result time 08/09/24 13:07:56      Wet Read by Millicent Fagan MD (08/09/24 13:07:56, [facility] Emergency Dept, Emergency Medicine)    Normal sinus rhythm, rate 80 beats per minute, normal [place] interval,  milliseconds, no STEMI.                                  Imaging Results              X-Ray Chest PA And Lateral (Final result)  Result time 08/09/24 13:26:57      Final result by Karl Ruiz MD (08/09/24 13:26:57)                   Impression:      1. No acute cardiopulmonary process appreciated.      Electronically signed by: Karl Ruiz  Date:    08/09/2024  Time:    13:26               Narrative:    EXAMINATION:  XR CHEST PA AND LATERAL    CLINICAL HISTORY:  Chest pain, unspecified    TECHNIQUE:  PA and lateral views of the chest were performed.    COMPARISON:  Chest radiograph 09/15/2020    FINDINGS:  Cardiomediastinal silhouette is within normal limits.    No focal consolidation, overt  "interstitial edema, sizable pleural effusion or pneumothorax.    Visualized osseous structures are grossly unremarkable.                                       Medications   pantoprazole EC tablet 40 mg (40 mg Oral Given 8/9/24 1404)   aluminum-magnesium hydroxide-simethicone 200-200-20 mg/5 mL suspension 30 mL (30 mLs Oral Given 8/9/24 1404)     And   LIDOcaine viscous HCl 2% oral solution 15 mL (15 mLs Oral Given 8/9/24 1404)     Medical Decision Making  44-year-old female with a history of "stomach issue" presents to the emergency department complaining of abdominal pain, chest pain, tingling.  Patient reports at 3:00 a.m. developed burning epigastric pain.  Around 7:00 a.m., she reports having substernal burning type pain, this radiates to her left shoulder and left upper arm with associated tingling in her left hand.  Recently she reports on and off "light" headache, she has had a few episodes of diarrhea.  She has been out of her Prilosec for about 1 day.  She is scheduled to see her primary care physician tomorrow.  She reports several weeks ago she had an episode of tingling in both hands that lasted approximately 45 minutes and resolved.  On exam, patient is well-appearing in no acute distress.  2+ radial pulses, strength and sensation intact in upper extremities.  Abdomen is soft and nontender.  Differential includes not limited to esophageal spasm, esophagitis, gastritis, PUD.  ACS a consideration but felt less likely.  Workup initiated with labs including troponin, lipase, ECG, chest x-ray.  Will treat with p.o. PPI and GI cocktail.    Amount and/or Complexity of Data Reviewed  Labs: ordered.     Details: CBC shows no leukocytosis, hemoglobin 11.1, hematocrit 36.7, normal platelet count.  Lipase normal.  Troponin and BNP negative.  CMP within acceptable limits.     Radiology: ordered.     Details: Chest x-ray without acute finding.  ECG/medicine tests: ordered and independent interpretation " performed.    Risk  OTC drugs.  Prescription drug management.            Scribe Attestation:   Scribe #1: I performed the above scribed service and the documentation accurately describes the services I performed. I attest to the accuracy of the note.        ED Course as of 08/09/24 1824   Fri Aug 09, 2024   1524 Patient reassessed, reports feeling a little improved.  Reviewed test results.  Patient has follow-up with her primary care for provider on Monday.  Advised return precautions, will refill her PPI.  Patient states she understands and agrees with plan.  UPT at bedside is negative, patient has had a tubal ligation. [LH]      ED Course User Index  [LH] Millicent Fagan MD                           Clinical Impression:  Final diagnoses:  [R07.89] Chest discomfort  [R07.9] Chest pain          ED Disposition Condition    Discharge           ED Prescriptions       Medication Sig Dispense Start Date End Date Auth. Provider    omeprazole (PRILOSEC) 40 MG capsule Take 1 capsule (40 mg total) by mouth once daily. Followup Dr.T Melara JULY 2024 or more refills. 60 capsule 8/9/2024 -- Millicent Fagan MD          Follow-up Information       Follow up With Specialties Details Why Contact Info    Shaun Melara MD Family Medicine On 8/12/2024  [address] [facility] [address]  [phone]      [facility] - Emergency Dept Emergency Medicine  As needed, If symptoms worsen [address] [facility] [place] [address] [phone]          I, Mlilicent Fagan MD, personally performed the services described in this documentation. All medical record entries made by the scribe were at my direction and in my presence.  I have reviewed the chart and agree that the record reflects my personal performance and is accurate and complete.     This dictation has been generated using M-Modal Fluency Direct dictation; some phonetic errors may occur.        Millicent Fagan,  MD  08/09/24 1820

## 2024-08-09 NOTE — DISCHARGE INSTRUCTIONS
Thank you for coming to our Emergency Department today. It is important to remember that some problems are difficult to diagnose and may not be found during your Emergency Department visit. Be sure to follow up with your primary care doctor and review all labs/imaging/tests that were performed during this visit with them. Some labs/tests may be outside of the normal range and require non-emergent follow-up and further investigation to help diagnose/exclude/prevent complications or other medical conditions.    If you do not have a primary care doctor, you may contact the one listed on your discharge paperwork or you may also call the [facility] Appointment Desk at [phone] to schedule an appointment and establish care with one. It is important to your health that you have a primary care doctor.    Medicaid Escalation Line:   [phone] - Please contact this number if you are having difficulty getting follow up with a Primary Care Provider or Speciality Provider.     Please take all medications as directed. All medications may potentially have side-effects and it is impossible to predict which medications may give you side-effects or what side-effects (if any) they will give you.. If you feel that you are having a negative effect or side-effect of any medication you should immediately stop taking them and seek medical attention. If you feel that you are having a life-threatening reaction call [phone].    Return to the ER with any questions/concerns, new/concerning symptoms, worsening or failure to improve.     Do not drive, swim, climb to height, take a bath or make any important decisions for 24 hours if you have received any pain medications, sedatives or mood altering drugs during your ER visit.

## 2024-08-10 LAB
OHS QRS DURATION: 80 MS
OHS QTC CALCULATION: 442 MS

## 2024-09-25 ENCOUNTER — OFFICE VISIT (OUTPATIENT)
Dept: FAMILY MEDICINE | Facility: CLINIC | Age: 44
End: 2024-09-25
Payer: COMMERCIAL

## 2024-09-25 DIAGNOSIS — F41.8 ANXIETY WITH DEPRESSION: Primary | ICD-10-CM

## 2024-09-25 PROCEDURE — 1160F RVW MEDS BY RX/DR IN RCRD: CPT | Mod: CPTII,95,, | Performed by: FAMILY MEDICINE

## 2024-09-25 PROCEDURE — 99214 OFFICE O/P EST MOD 30 MIN: CPT | Mod: 95,,, | Performed by: FAMILY MEDICINE

## 2024-09-25 PROCEDURE — 1159F MED LIST DOCD IN RCRD: CPT | Mod: CPTII,95,, | Performed by: FAMILY MEDICINE

## 2024-09-25 RX ORDER — SERTRALINE HYDROCHLORIDE 50 MG/1
50 TABLET, FILM COATED ORAL DAILY
Qty: 90 TABLET | Refills: 1 | Status: SHIPPED | OUTPATIENT
Start: 2024-09-25 | End: 2025-09-25

## 2024-09-25 NOTE — PROGRESS NOTES
The patient location is: home  The chief complaint leading to consultation is: anxiety/stress    Visit type: Virtual visit with synchronous audio and video  Total time spent with patient: 16 minutes  Each patient to whom he or she provides medical services by telemedicine is:  (1) informed of the relationship between the physician and patient and the respective role of any other health care provider with respect to management of the patient; and (2) notified that he or she may decline to receive medical services by telemedicine and may withdraw from such care at any time.         Office Visit    Patient Name: Crys Peña    : 1980  MRN: [id number]      Assessment/Plan:  Crys Peña is a 44 y.o. female who presents today for :    Anxiety with depression  -     sertraline (ZOLOFT) 50 MG tablet; Take 1 tablet (50 mg total) by mouth once daily.  Dispense: 90 tablet; Refill: 1  -     Ambulatory referral/consult to Psychiatry; Future; Expected date: 10/02/2024  -     Ambulatory referral/consult to Psychology; Future; Expected date: 10/02/2024  -Start trial of SSRI, advised pt that it may take 4-6 weeks to take full effects, also discussed with patient about potential side effects. Patient was given counselor's information to contact if patient desires to speak with a therapist. Stress management measures discussed with patient at length, including but not limited to, regular physical exercise, healthy diet, relaxation techniques.      -f/u in 5 weeks        This note was created by combination of typed  and MModal dictation.  Transcription errors may be present.  If there are any questions, please contact me.      ----------------------------------------------------------------------------------------------------------------------      HPI:  Patient Care Team:  Shaun Melara MD as PCP - General (Family Medicine)  Myriam Craft MA (Inactive) as Care Coordinator    Crys is a 44 y.o.  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 She also experiences low moods, decreased appetite. No SI/HI/panic attack.  She also has decreased sleep, which she thinks may contribute to her occasional headaches. She is open to counseling and starting medication.      Additional ROS    Negative review of system  except per HPI               Patient Active Problem List   Diagnosis    -Anemia    -Obesity (BMI 30-39.9)    Plantar fasciitis of right foot    -Gastritis without bleeding    Seasonal allergic rhinitis    -Prediabetes    -Bilateral carpal tunnel syndrome - mild       Current Medications  Medications reviewed/updated.     Current Outpatient Medications on File Prior to Visit   Medication Sig Dispense Refill    ferrous sulfate (IRON, FERROUS SULFATE,) 325 mg (65 mg iron) Tab tablet Take 1 tablet (325 mg total) by mouth daily with breakfast. Followup Dr.T Melara OCTOBER 2023 for more refills, call to schedule/get labs 1wk before doctor's appointment (ordered). May schedule a VIDEO or TELEPHONE visit if desired 90 tablet 1    fexofenadine (ALLEGRA) 60 MG tablet Take 1 tablet (60 mg total) by mouth once daily. Followup Dr.T Melara JUNE 2023 for more refills as well as ANNUAL checkup (Patient not taking: Reported on 11/8/2023) 90 tablet 3    naproxen (NAPROSYN) 500 MG tablet Take 1 tablet (500 mg total) by mouth 2 (two) times daily with meals. 30 tablet 1    omeprazole (PRILOSEC) 40 MG capsule Take 1 capsule (40 mg total) by  mouth once daily. Followup Dr.T Melara JULY 2024 or more refills. 60 capsule 0     No current facility-administered medications on file prior to visit.           Past Surgical History:   Procedure Laterality Date    DILATION AND CURETTAGE OF UTERUS      miscarriage       Family History   Problem Relation Name Age of Onset    Breast cancer Neg Hx      Colon cancer Neg Hx      Ovarian cancer Neg Hx         Social History     Socioeconomic History    Marital status: Single   Tobacco Use    Smoking status: Never    Smokeless tobacco: Never   Substance and Sexual Activity    Alcohol use: No    Drug use: No    Sexual activity: Yes     Partners: Male     Birth control/protection: See Surgical Hx     Comment: BTL      Social Determinants of Health     Financial Resource Strain: Low Risk  (9/25/2024)    Overall Financial Resource Strain (CARDIA)     Difficulty of Paying Living Expenses: Not hard at all   Food Insecurity: No Food Insecurity (9/25/2024)    Hunger Vital Sign     Worried About Running Out of Food in the Last Year: Never true     Ran Out of Food in the Last Year: Never true   Transportation Needs: No Transportation Needs (9/15/2020)    PRAPARE - Transportation     Lack of Transportation (Medical): No     Lack of Transportation (Non-Medical): No   Physical Activity: Insufficiently Active (9/25/2024)    Exercise Vital Sign     Days of Exercise per Week: 4 days     Minutes of Exercise per Session: 30 min   Stress: Stress Concern Present (9/25/2024)    [employer] - Occupational Stress Questionnaire     Feeling of Stress : Very much   Housing Stability: Unknown (9/25/2024)    Housing Stability Vital Sign     Unable to Pay for Housing in the Last Year: No             Allergies   Review of patient's allergies indicates:  No Known Allergies          Review of Systems  See HPI        Physical Exam  There were no vitals taken for this visit.    GEN: NAD

## 2024-10-28 ENCOUNTER — PATIENT MESSAGE (OUTPATIENT)
Dept: PSYCHIATRY | Facility: CLINIC | Age: 44
End: 2024-10-28
Payer: COMMERCIAL

## 2025-04-22 ENCOUNTER — HOSPITAL ENCOUNTER (OUTPATIENT)
Dept: RADIOLOGY | Facility: HOSPITAL | Age: 45
Discharge: HOME | End: 2025-04-22
Attending: FAMILY MEDICINE
Payer: COMMERCIAL

## 2025-04-22 DIAGNOSIS — Z12.31 ENCOUNTER FOR SCREENING MAMMOGRAM FOR BREAST CANCER: ICD-10-CM

## 2025-04-22 PROCEDURE — 77063 BREAST TOMOSYNTHESIS BI: CPT | Mod: TC

## 2025-04-22 PROCEDURE — 77067 SCR MAMMO BI INCL CAD: CPT | Mod: 26,,, | Performed by: RADIOLOGY

## 2025-04-22 PROCEDURE — 77063 BREAST TOMOSYNTHESIS BI: CPT | Mod: 26,,, | Performed by: RADIOLOGY

## 2025-04-27 ENCOUNTER — HOSPITAL ENCOUNTER (EMERGENCY)
Facility: HOSPITAL | Age: 45
Discharge: HOME | End: 2025-04-27
Attending: EMERGENCY MEDICINE
Payer: COMMERCIAL

## 2025-04-27 VITALS
RESPIRATION RATE: 18 BRPM | HEIGHT: 64 IN | SYSTOLIC BLOOD PRESSURE: 131 MMHG | WEIGHT: 202 LBS | OXYGEN SATURATION: 97 % | HEART RATE: 71 BPM | BODY MASS INDEX: 34.49 KG/M2 | TEMPERATURE: 99 F | DIASTOLIC BLOOD PRESSURE: 77 MMHG

## 2025-04-27 DIAGNOSIS — J40 BRONCHITIS: ICD-10-CM

## 2025-04-27 DIAGNOSIS — J06.9 UPPER RESPIRATORY TRACT INFECTION, UNSPECIFIED TYPE: Primary | ICD-10-CM

## 2025-04-27 DIAGNOSIS — R05.9 COUGH: ICD-10-CM

## 2025-04-27 LAB
B-HCG UR QL: NEGATIVE
CTP QC/QA: YES
CTP QC/QA: YES
MOLECULAR STREP A: NEGATIVE

## 2025-04-27 PROCEDURE — 87651 STREP A DNA AMP PROBE: CPT

## 2025-04-27 PROCEDURE — 81025 URINE PREGNANCY TEST: CPT

## 2025-04-27 PROCEDURE — 99284 EMERGENCY DEPT VISIT MOD MDM: CPT | Mod: 25

## 2025-04-27 RX ORDER — GUAIFENESIN 100 MG/5ML
100-200 LIQUID ORAL EVERY 4 HOURS PRN
Qty: 60 ML | Refills: 0 | Status: SHIPPED | OUTPATIENT
Start: 2025-04-27 | End: 2025-05-07

## 2025-04-27 RX ORDER — ALBUTEROL SULFATE 90 UG/1
2 INHALANT RESPIRATORY (INHALATION) EVERY 6 HOURS PRN
Qty: 18 G | Refills: 0 | Status: SHIPPED | OUTPATIENT
Start: 2025-04-27 | End: 2025-05-27

## 2025-04-27 RX ORDER — LORATADINE 10 MG/1
10 TABLET ORAL DAILY
Qty: 30 TABLET | Refills: 0 | Status: SHIPPED | OUTPATIENT
Start: 2025-04-27 | End: 2025-05-27

## 2025-04-27 RX ORDER — BENZONATATE 100 MG/1
100 CAPSULE ORAL 3 TIMES DAILY PRN
Qty: 15 CAPSULE | Refills: 0 | Status: SHIPPED | OUTPATIENT
Start: 2025-04-27 | End: 2025-05-02

## 2025-04-27 RX ORDER — FLUTICASONE PROPIONATE 50 MCG
1 SPRAY, SUSPENSION (ML) NASAL 2 TIMES DAILY PRN
Qty: 15 G | Refills: 0 | Status: SHIPPED | OUTPATIENT
Start: 2025-04-27 | End: 2025-05-04

## 2025-04-27 NOTE — DISCHARGE INSTRUCTIONS
Thank you for coming to our Emergency Department today. It is important to remember that some problems or medical conditions are difficult to diagnose and may not be found or addressed during your Emergency Department visit.  These conditions often start with non-specific symptoms and can only be diagnosed on follow up visits with your primary care physician or specialist when the symptoms continue or change. Please remember that all medical conditions can change, and we cannot predict how you will be feeling tomorrow or the next day. Return to the ER with any questions/concerns, new/concerning symptoms, worsening or failure to improve.       Be sure to follow up with your primary care doctor and review all labs/imaging/tests that were performed during your ER visit with them. It is very common for us to identify non-emergent incidental findings which must be followed up with your primary care physician.  Some labs/imaging/tests may be outside of the normal range, and require non-emergent follow-up and/or further investigation/treatment/procedures/testing to help diagnose/exclude/prevent complications or other potentially serious medical conditions. Some abnormalities may not have been discussed or addressed during your ER visit.     An ER visit does not replace a primary care visit, and many screening tests or follow-up tests cannot be ordered by an ER doctor or performed by the ER. Some tests may even require pre-approval.    If you do not have a primary care doctor, you may contact the one listed on your discharge paperwork or you may also call the [facility] Appointment Desk at [phone] , or [address] [place] [address] at  [phone] to schedule an appointment, or establish care with a primary care doctor or even a specialist and to obtain information about local resources. It is important to your health that you have a primary care doctor.    Please take all medications as directed. We have done our best to select  a medication for you that will treat your condition however, all medications may potentially have side-effects and it is impossible to predict which medications may give you side-effects or what those side-effects (if any) those medications may give you.  If you feel that you are having a negative effect or side-effect of any medication you should stop taking those medications immediately and seek medical attention. If you feel that you are having a life-threatening reaction call 911.        Do not drive, swim, climb to height, take a bath, operate heavy machinery, drink alcohol or take potentially sedating medications, sign any legal documents or make any important decisions for 24 hours if you have received any pain medications, sedatives or mood altering drugs during your ER visit or within 24 hours of taking them if they have been prescribed to you.     You can find additional resources for Dentists, hearing aids, durable medical equipment, low cost pharmacies and other resources at [email or web address]

## 2025-04-27 NOTE — ED PROVIDER NOTES
"Encounter Date: 4/27/2025    SCRIBE #1 NOTE: I, Gosia Ruiz, am scribing for, and in the presence of,  Rebecca Rey PA-C. I have scribed the following portions of the note - Other sections scribed: HPI, ROS, PE.       History     Chief Complaint   Patient presents with    Sore Throat     X7 days. States scratchy and irritated.     Cough     Productive cough with chest congestion x1 week. Started with nasal congestion and post nasal drip yesterday. Used mucinex when symptoms initially started.      Crys Peña is a 45 y.o. female, with no pertinent PMHx, who presents to the ED with URI symptoms x7 days. Patient reports associated symptoms of right ear pain, productive cough, sore throat, post nasal drip, nasal congestion, and chest congestion. Notes that her chest congestion symptoms usually worsens at night. States that she started producing "green sputum" this morning and reports that her throat has been feeling "scratchy and irritated" lately. Endorses attempted treatment with Mucinex without relief. No other exacerbating or alleviating factors. Denies taking any daily medications. Denies fever or other associated symptoms. Denies any past hx of asthma.       The history is provided by the patient. No  was used.     Review of patient's allergies indicates:  No Known Allergies  Past Medical History:   Diagnosis Date    Pregnancy      Past Surgical History:   Procedure Laterality Date    DILATION AND CURETTAGE OF UTERUS      miscarriage     Family History   Problem Relation Name Age of Onset    Breast cancer Neg Hx      Colon cancer Neg Hx      Ovarian cancer Neg Hx       Social History[1]  Review of Systems   Constitutional:  Negative for chills and fever.   HENT:  Positive for congestion, ear pain (right), postnasal drip and sore throat.    Eyes:  Negative for visual disturbance.   Respiratory:  Positive for cough. Negative for shortness of breath.         (+) Chest congestion "   Cardiovascular:  Negative for chest pain.   Gastrointestinal:  Negative for abdominal pain, nausea and vomiting.   Genitourinary:  Negative for dysuria and vaginal discharge.   Skin:  Negative for rash.   Neurological:  Negative for headaches.   Psychiatric/Behavioral:  Negative for decreased concentration.        Physical Exam     Initial Vitals [04/27/25 0905]   BP Pulse Resp Temp SpO2   131/77 71 18 99 °F (37.2 °C) 97 %      MAP       --         Physical Exam    Nursing note and vitals reviewed.  Constitutional: She appears well-developed and well-nourished. She is not diaphoretic. No distress.   HENT:   Head: Normocephalic and atraumatic.   Right Ear: Tympanic membrane and external ear normal.   Left Ear: Tympanic membrane and external ear normal. Mouth/Throat: Uvula is midline. Posterior oropharyngeal edema (mild) present.   No tonsillar exudate.   Eyes: Conjunctivae and EOM are normal.   Neck: No tracheal deviation present. No JVD present.   Normal range of motion.  Cardiovascular:  Normal rate, regular rhythm and normal heart sounds.           Pulmonary/Chest: Breath sounds normal. No stridor. No respiratory distress. She has no wheezes. She has no rales.   Musculoskeletal:      Cervical back: Normal range of motion.     Neurological: She is alert and oriented to person, place, and time.   Skin: No rash noted. No erythema.   Psychiatric: She has a normal mood and affect.         ED Course   Procedures  Labs Reviewed   POCT STREP A MOLECULAR       Result Value    Molecular Strep A, POC Negative       Acceptable Yes     POCT URINE PREGNANCY    POC Preg Test, Ur Negative       Acceptable Yes            Imaging Results              X-Ray Chest PA And Lateral (Final result)  Result time 04/27/25 10:06:31      Final result by Shai Vergara MD (04/27/25 10:06:31)                   Impression:      No acute chest disease identified.      Electronically signed by: Shai Vregara  MD  Date:    04/27/2025  Time:    10:06               Narrative:    EXAMINATION:  XR CHEST PA AND LATERAL    CLINICAL HISTORY:  Cough, unspecified    TECHNIQUE:  PA and lateral views of the chest were performed.    COMPARISON:  08/09/2024.    FINDINGS:  The cardiac silhouette and superior mediastinal structures are unremarkable. Pulmonary vasculature is within normal limits. The lungs are well aerated and free of focal consolidations. There is no evidence for pneumothorax or pleural effusions. Bony structures are grossly intact.                                       Medications - No data to display  Medical Decision Making  Patient is a well-appearing 45 y.o. female. Tolerating PO, non-toxic appearing. The patient remained comfortable and stable during their visit in the ED.  Details of ED course documented in ED workup.     Differential Diagnosis is considered, but is not limited to: COVID, Flu, Strep throat, Viral URI, PTA, RPA, pneumonia, acute otitis media, otitis externa, mastoiditis, sinusitis, viral gastroenteritis, measles, roseola.  Also considered but less likely:  PTA/RPA: no hot potato voice, no uvular deviation, no pain with passive neck flexion.  Esophageal rupture: No history of dysphagia.  Unlikely deep space infection/Alan's, no submandibular or sublingual erythema or swelling.  Low suspicion for CNS infection/meningitis: no pain with passive neck flexion, no neck rigidity/stiffness, no neck tenderness, negative Brudzinski.  Unlikely EBV as no splenomegaly, no LUQ abdominal tenderness or pain.      All historical, clinical, and laboratory findings reviewed. Patient with constellation of symptoms most consistent with acute bronchitis. There are no concerning features on physical exam to suggest an emergent or life threatening condition or an invasive bacterial infection, including, but not limited to the conditions noted above. No further intervention is indicated at this time. The patient is at  low risk for an emergent/life threatening medical condition at this time, and I am of the belief that that it is safe to discharge the patient from the emergency department.     Patient/family instructed to follow up with PCP/Pediatrician in 1-2 days for recheck of today's complaints. Patient/family has been counseled regarding the need for follow-up as well as the indications to return to the emergency room should new, worsening, continued or worrisome developments. Discharge and follow-up instructions discussed with the patient/family who expressed understanding and willingness to comply with recommendations. Patient discharged from the emergency department in stable condition, in no acute distress.  I discussed with the patient/family the diagnosis, treatment plan, indications for return to the emergency department, and for expected follow-up. The patient/family verbalized an understanding. The patient/family is asked if there are any questions or concerns. We discuss the case, until all issues are addressed to the patient/family's satisfaction. Patient/family understands and is agreeable to the plan.     Amount and/or Complexity of Data Reviewed  Labs: ordered. Decision-making details documented in ED Course.  Radiology: ordered. Decision-making details documented in ED Course.    Risk  OTC drugs.  Prescription drug management.            Scribe Attestation:   Scribe #1: I performed the above scribed service and the documentation accurately describes the services I performed. I attest to the accuracy of the note.                               Clinical Impression:  Final diagnoses:  [R05.9] Cough  [J06.9] Upper respiratory tract infection, unspecified type (Primary)  [J40] Bronchitis          ED Disposition Condition    Discharge Stable          ED Prescriptions       Medication Sig Dispense Start Date End Date Auth. Provider    benzonatate (TESSALON) 100 MG capsule Take 1 capsule (100 mg total) by mouth 3  (three) times daily as needed. 15 capsule 4/27/2025 5/2/2025 Rebecca Rey PA-C    loratadine (CLARITIN) 10 mg tablet Take 1 tablet (10 mg total) by mouth once daily. 30 tablet 4/27/2025 5/27/2025 Rebecca Rey PA-C    fluticasone propionate (FLONASE) 50 mcg/actuation nasal spray 1 spray (50 mcg total) by Each Nostril route 2 (two) times daily as needed for Rhinitis. 15 g 4/27/2025 5/4/2025 Rebecca Rey PA-C    guaiFENesin 100 mg/5 ml (ROBITUSSIN) 100 mg/5 mL syrup Take 5-10 mLs (100-200 mg total) by mouth every 4 (four) hours as needed for Cough. 60 mL 4/27/2025 5/7/2025 Rebecca Rey PA-C    albuterol (VENTOLIN HFA) 90 mcg/actuation inhaler Inhale 2 puffs into the lungs every 6 (six) hours as needed for Wheezing. Rescue 18 g 4/27/2025 5/27/2025 Rebecca Rey PA-C          Follow-up Information       Follow up With Specialties Details Why Contact Info    Shaun Melara MD Family Medicine   [address] [place] [address] [phone]      [facility] Emergency Medicine Go to  for new or worsening symptoms [address] [facility] [place] [address] [phone]          I, Rebecca Rey PA-C, personally performed the services described in this documentation. All medical record entries made by the scribe were at my direction and in my presence. I have reviewed the chart and agree that the record reflects my personal performance and is accurate and complete.      DISCLAIMER: This note was prepared with [employer] voice recognition transcription software. Garbled syntax, mangled pronouns, and other bizarre constructions may be attributed to that software system.         [1]   Social History  Tobacco Use    Smoking status: Never    Smokeless tobacco: Never   Substance Use Topics    Alcohol use: No    Drug use: No        Rebecca Rey PA-C  04/27/25 [id number]

## 2025-04-27 NOTE — Clinical Note
"Crys"Carole Peña was seen and treated in our emergency department on 4/27/2025.  She may return to work on 04/29/2025.       If you have any questions or concerns, please don't hesitate to call.      Rebecca Rey PA-C"

## 2025-04-30 DIAGNOSIS — R73.03 PREDIABETES: ICD-10-CM

## 2025-05-21 ENCOUNTER — OFFICE VISIT (OUTPATIENT)
Dept: OBSTETRICS AND GYNECOLOGY | Facility: CLINIC | Age: 45
End: 2025-05-21
Payer: COMMERCIAL

## 2025-05-21 VITALS
DIASTOLIC BLOOD PRESSURE: 80 MMHG | SYSTOLIC BLOOD PRESSURE: 110 MMHG | BODY MASS INDEX: 34.85 KG/M2 | WEIGHT: 203.06 LBS

## 2025-05-21 DIAGNOSIS — Z12.4 ENCOUNTER FOR PAPANICOLAOU SMEAR FOR CERVICAL CANCER SCREENING: ICD-10-CM

## 2025-05-21 DIAGNOSIS — Z01.419 WELL WOMAN EXAM WITH ROUTINE GYNECOLOGICAL EXAM: Primary | ICD-10-CM

## 2025-05-21 PROCEDURE — 99999 PR PBB SHADOW E&M-EST. PATIENT-LVL III: CPT | Mod: PBBFAC,,, | Performed by: OBSTETRICS & GYNECOLOGY

## 2025-05-21 NOTE — PROGRESS NOTES
SUBJECTIVE:   Crys Peña is a 45 y.o. female   for annual well woman exam. Patient's last menstrual period was 2025..  She has no unusual complaints.      Contraception: BTL  Cycles started to skip every other month, + VMS but very mild  Denies  IMB    Past Medical History:   Diagnosis Date    Pregnancy      Past Surgical History:   Procedure Laterality Date    DILATION AND CURETTAGE OF UTERUS      miscarriage     Social History     Socioeconomic History    Marital status: Single   Tobacco Use    Smoking status: Never    Smokeless tobacco: Never   Substance and Sexual Activity    Alcohol use: No    Drug use: No    Sexual activity: Yes     Partners: Male     Birth control/protection: See Surgical Hx     Comment: BTL      Social Drivers of Health     Financial Resource Strain: Low Risk  (2024)    Overall Financial Resource Strain (CARDIA)     Difficulty of Paying Living Expenses: Not hard at all   Food Insecurity: No Food Insecurity (2024)    Hunger Vital Sign     Worried About Running Out of Food in the Last Year: Never true     Ran Out of Food in the Last Year: Never true   Transportation Needs: No Transportation Needs (9/15/2020)    PRAPARE - Transportation     Lack of Transportation (Medical): No     Lack of Transportation (Non-Medical): No   Physical Activity: Insufficiently Active (2024)    Exercise Vital Sign     Days of Exercise per Week: 4 days     Minutes of Exercise per Session: 30 min   Stress: Stress Concern Present (2024)    [employer] - Occupational Stress Questionnaire     Feeling of Stress : Very much   Housing Stability: Unknown (2024)    Housing Stability Vital Sign     Unable to Pay for Housing in the Last Year: No     Family History   Problem Relation Name Age of Onset    Breast cancer Neg Hx      Colon cancer Neg Hx      Ovarian cancer Neg Hx       OB History    Para Term  AB Living   6 5 5  1 4   SAB IAB  Ectopic Multiple Live Births   1   0 4      # Outcome Date GA Lbr Hernan/2nd Weight Sex Type Anes PTL Lv   6 Term 08/31/17 38w5d / 01:25 2.76 kg (6 lb 1.4 oz) F Vag-Spont EPI N    5 SAB 2016           4 Term 06/19/12 40w0d  3.175 kg (7 lb) F Vag-Spont EPI N RUPERTO   3 Term 12/01/04 40w0d  3.175 kg (7 lb) M Vag-Spont EPI N RUPERTO   2 Term 05/26/02 37w0d  2.722 kg (6 lb) F Vag-Spont EPI N RUPERTO   1 Term 08/10/01 42w0d  3.175 kg (7 lb) F Vag-Spont EPI N RUPERTO      Obstetric Comments   Gynhx: reg/3.  Normal flow   S/p btl   Denies std   Denies abnl pap, pap 2020 neg/hpv neg         Current Outpatient Medications   Medication Sig Dispense Refill    albuterol (VENTOLIN HFA) 90 mcg/actuation inhaler Inhale 2 puffs into the lungs every 6 (six) hours as needed for Wheezing. Rescue 18 g 0    ferrous sulfate (IRON, FERROUS SULFATE,) 325 mg (65 mg iron) Tab tablet Take 1 tablet (325 mg total) by mouth daily with breakfast. Followup Dr.T Melara OCTOBER 2023 for more refills, call to schedule/get labs 1wk before doctor's appointment (ordered). May schedule a VIDEO or TELEPHONE visit if desired 90 tablet 1    fexofenadine (ALLEGRA) 60 MG tablet Take 1 tablet (60 mg total) by mouth once daily. Followup Dr.T Melara JUNE 2023 for more refills as well as ANNUAL checkup (Patient not taking: Reported on 11/8/2023) 90 tablet 3    loratadine (CLARITIN) 10 mg tablet Take 1 tablet (10 mg total) by mouth once daily. 30 tablet 0    naproxen (NAPROSYN) 500 MG tablet Take 1 tablet (500 mg total) by mouth 2 (two) times daily with meals. 30 tablet 1    omeprazole (PRILOSEC) 40 MG capsule Take 1 capsule (40 mg total) by mouth once daily. Followup Dr.T Melara JULY 2024 or more refills. 60 capsule 0    sertraline (ZOLOFT) 50 MG tablet Take 1 tablet (50 mg total) by mouth once daily. 90 tablet 1     No current facility-administered medications for this visit.     Allergies: Patient has no known allergies.       ROS:  GENERAL: Denies weight gain or weight loss.  Feeling well overall.   SKIN: Denies rash or lesions.   HEAD: Denies head injury or headache.   NODES: Denies enlarged lymph nodes.   CHEST: Denies chest pain or shortness of breath.   CARDIOVASCULAR: Denies palpitations or left sided chest pain.   ABDOMEN: No abdominal pain, constipation, diarrhea, nausea, vomiting or rectal bleeding.   URINARY: No frequency, dysuria, hematuria, or burning on urination.  REPRODUCTIVE: Denies vaginal discharge, abnormal vaginal bleeding, lesions, pelvic pain  BREASTS: The patient performs breast self-examination and denies pain, lumps, or nipple discharge.   HEMATOLOGIC: No easy bruisability or excessive bleeding.  MUSCULOSKELETAL: Denies joint pain or swelling.   NEUROLOGIC: Denies syncope or weakness.   PSYCHIATRIC: Denies depression, anxiety or mood swings.      OBJECTIVE:   /80   Wt 92.1 kg (203 lb 0.7 oz)   LMP 05/01/2025   BMI 34.85 kg/m²   The patient appears well, alert, oriented x 3, in no distress.  PSYCH:  Normal, full range of affect  NECK: negative, no thyromegaly, trachea midline  SKIN: normal, good color, good turgor and no acne, striae, hirsutism  BREAST EXAM: breasts appear normal, no suspicious masses, no skin or nipple changes or axillary nodes  ABDOMEN: soft, non-tender; bowel sounds normal; no masses,  no organomegaly and no hernias, masses, or hepatosplenomegaly  GENITALIA: normal external genitalia, no erythema, no discharge  BLADDER: soft  URETHRA: normal appearing urethra with no masses, tenderness or lesions and normal urethra, normal urethral meatus  VAGINA: Normal  CERVIX: no lesions or cervical motion tenderness  UTERUS: normal size, contour, position, consistency, mobility, non-tender  ADNEXA: no mass, fullness, tenderness      ASSESSMENT:   1. Health maintenance  -pap done  -screening MMG neg 4/2025 and UTD  -counseled on exercise and healthy diet, weight loss  -bone health:  Discussed Vitamin D and Calcium supplementation, weight bearing  exercises  2.  Discussed safety at home/school/work, healthy and balanced diet, sleep hygiene, as well as violence/weapons exposure.   3.  Likely perimenopause

## 2025-05-27 ENCOUNTER — RESULTS FOLLOW-UP (OUTPATIENT)
Dept: OBSTETRICS AND GYNECOLOGY | Facility: HOSPITAL | Age: 45
End: 2025-05-27

## 2025-05-27 NOTE — PROGRESS NOTES
Please inform pt her pap smear did not show any pre-cancerous cell but she has HPV. No treatment or biopsy needed at this time.   Recommend repeating pap smear in one year for closer observation

## 2025-05-28 ENCOUNTER — CLINICAL SUPPORT (OUTPATIENT)
Dept: OBSTETRICS AND GYNECOLOGY | Facility: CLINIC | Age: 45
End: 2025-05-28
Payer: COMMERCIAL

## 2025-05-28 DIAGNOSIS — Z23 NEED FOR HPV VACCINE: Primary | ICD-10-CM

## 2025-05-28 PROCEDURE — 90651 9VHPV VACCINE 2/3 DOSE IM: CPT | Mod: S$GLB,,, | Performed by: OBSTETRICS & GYNECOLOGY

## 2025-05-28 PROCEDURE — 99999 PR PBB SHADOW E&M-EST. PATIENT-LVL I: CPT | Mod: PBBFAC,,,

## 2025-05-28 PROCEDURE — 90471 IMMUNIZATION ADMIN: CPT | Mod: S$GLB,,, | Performed by: OBSTETRICS & GYNECOLOGY

## 2025-08-15 ENCOUNTER — PATIENT MESSAGE (OUTPATIENT)
Dept: PSYCHIATRY | Facility: HOSPITAL | Age: 45
End: 2025-08-15
Payer: COMMERCIAL